# Patient Record
Sex: MALE | Race: WHITE | NOT HISPANIC OR LATINO | Employment: FULL TIME | URBAN - METROPOLITAN AREA
[De-identification: names, ages, dates, MRNs, and addresses within clinical notes are randomized per-mention and may not be internally consistent; named-entity substitution may affect disease eponyms.]

---

## 2019-05-13 ENCOUNTER — OFFICE VISIT (OUTPATIENT)
Dept: FAMILY MEDICINE | Facility: CLINIC | Age: 34
End: 2019-05-13
Payer: MEDICAID

## 2019-05-13 VITALS
WEIGHT: 270 LBS | BODY MASS INDEX: 37.8 KG/M2 | HEIGHT: 71 IN | TEMPERATURE: 98.5 F | HEART RATE: 72 BPM | SYSTOLIC BLOOD PRESSURE: 122 MMHG | DIASTOLIC BLOOD PRESSURE: 83 MMHG

## 2019-05-13 DIAGNOSIS — K21.9 GASTROESOPHAGEAL REFLUX DISEASE WITHOUT ESOPHAGITIS: ICD-10-CM

## 2019-05-13 DIAGNOSIS — Z23 NEED FOR TUBERCULOSIS VACCINATION: ICD-10-CM

## 2019-05-13 DIAGNOSIS — Z00.00 ANNUAL PHYSICAL EXAM: Primary | ICD-10-CM

## 2019-05-13 DIAGNOSIS — F32.5 MAJOR DEPRESSIVE DISORDER IN FULL REMISSION, UNSPECIFIED WHETHER RECURRENT (H): ICD-10-CM

## 2019-05-13 DIAGNOSIS — Z23 NEED FOR PROPHYLACTIC VACCINATION WITH TETANUS-DIPHTHERIA (TD): ICD-10-CM

## 2019-05-13 DIAGNOSIS — J45.20 MILD INTERMITTENT ASTHMA, UNSPECIFIED WHETHER COMPLICATED: ICD-10-CM

## 2019-05-13 PROCEDURE — 86580 TB INTRADERMAL TEST: CPT | Performed by: FAMILY MEDICINE

## 2019-05-13 PROCEDURE — 99385 PREV VISIT NEW AGE 18-39: CPT | Performed by: FAMILY MEDICINE

## 2019-05-13 RX ORDER — IPRATROPIUM BROMIDE 21 UG/1
2 SPRAY, METERED NASAL EVERY 12 HOURS
COMMUNITY
Start: 2019-05-13 | End: 2019-05-20

## 2019-05-13 RX ORDER — FLUTICASONE PROPIONATE AND SALMETEROL 232; 14 UG/1; UG/1
1 POWDER, METERED RESPIRATORY (INHALATION) 2 TIMES DAILY
Qty: 1 INHALER | Refills: 11 | Status: SHIPPED | OUTPATIENT
Start: 2019-05-13 | End: 2019-05-29 | Stop reason: ALTCHOICE

## 2019-05-13 ASSESSMENT — ENCOUNTER SYMPTOMS
ABDOMINAL PAIN: 0
SORE THROAT: 0
PARESTHESIAS: 0
WEAKNESS: 0
FREQUENCY: 0
EYE PAIN: 0
CONSTIPATION: 0
MYALGIAS: 0
ARTHRALGIAS: 0
HEARTBURN: 0
JOINT SWELLING: 0
HEMATOCHEZIA: 0
DYSURIA: 0
NAUSEA: 0
NERVOUS/ANXIOUS: 0
SHORTNESS OF BREATH: 0
DIARRHEA: 0
CHILLS: 0
COUGH: 0
HEMATURIA: 0
HEADACHES: 0
FEVER: 0
PALPITATIONS: 0
DIZZINESS: 0

## 2019-05-13 ASSESSMENT — ASTHMA QUESTIONNAIRES
ACT_TOTALSCORE: 14
QUESTION_4 LAST FOUR WEEKS HOW OFTEN HAVE YOU USED YOUR RESCUE INHALER OR NEBULIZER MEDICATION (SUCH AS ALBUTEROL): THREE OR MORE TIMES PER DAY
QUESTION_3 LAST FOUR WEEKS HOW OFTEN DID YOUR ASTHMA SYMPTOMS (WHEEZING, COUGHING, SHORTNESS OF BREATH, CHEST TIGHTNESS OR PAIN) WAKE YOU UP AT NIGHT OR EARLIER THAN USUAL IN THE MORNING: ONCE A WEEK
QUESTION_5 LAST FOUR WEEKS HOW WOULD YOU RATE YOUR ASTHMA CONTROL: WELL CONTROLLED
QUESTION_1 LAST FOUR WEEKS HOW MUCH OF THE TIME DID YOUR ASTHMA KEEP YOU FROM GETTING AS MUCH DONE AT WORK, SCHOOL OR AT HOME: NONE OF THE TIME
QUESTION_2 LAST FOUR WEEKS HOW OFTEN HAVE YOU HAD SHORTNESS OF BREATH: MORE THAN ONCE A DAY

## 2019-05-13 ASSESSMENT — MIFFLIN-ST. JEOR: SCORE: 2181.58

## 2019-05-13 ASSESSMENT — PAIN SCALES - GENERAL: PAINLEVEL: NO PAIN (0)

## 2019-05-13 NOTE — LETTER
My Asthma Action Plan  Name: Jorge Madrigal   YOB: 1985  Date: 5/13/2019   My doctor: Cary Moon MD   My clinic: [facility]        My Control Medicine: Fluticasone + salmeterol (Advair) -  /21 mcg bid  My Rescue Medicine: Albuterol (Proair/Ventolin/Proventil) inhaler qid as needed   My Asthma Severity: intermittent  Avoid your asthma triggers: pollens and mold        The medication may be given at school or day care?: NA  Child can carry and use inhaler at school with approval of school nurse?: Yes and No       GREEN ZONE   Good Control    I feel good    No cough or wheeze    Can work, sleep and play without asthma symptoms       Take your asthma control medicine every day.     1. If exercise triggers your asthma, take your rescue medication    15 minutes before exercise or sports, and    During exercise if you have asthma symptoms  2. Spacer to use with inhaler: If you have a spacer, make sure to use it with your inhaler             YELLOW ZONE Getting Worse  I have ANY of these:    I do not feel good    Cough or wheeze    Chest feels tight    Wake up at night   1. Keep taking your Green Zone medications  2. Start taking your rescue medicine:    every 20 minutes for up to 1 hour. Then every 4 hours for 24-48 hours.  3. If you stay in the Yellow Zone for more than 12-24 hours, contact your doctor.  4. If you do not return to the Green Zone in 12-24 hours or you get worse, start taking your oral steroid medicine if prescribed by your provider.           RED ZONE Medical Alert - Get Help  I have ANY of these:    I feel awful    Medicine is not helping    Breathing getting harder    Trouble walking or talking    Nose opens wide to breathe       1. Take your rescue medicine NOW  2. If your provider has prescribed an oral steroid medicine, start taking it NOW  3. Call your doctor NOW  4. If you are still in the Red Zone after 20 minutes and you have not reached  your doctor:    Take your rescue medicine again and    Call 911 or go to the emergency room right away    See your regular doctor within 2 weeks of an Emergency Room or Urgent Care visit for follow-up treatment.          Annual Reminders:  Meet with Asthma Educator,  Flu Shot in the Fall, consider Pneumonia Vaccination for patients with asthma (aged 19 and older).    Pharmacy: [facility] [id number] [facility] [address] [place] [address] NE AT [address]                      Asthma Triggers  How To Control Things That Make Your Asthma Worse    Triggers are things that make your asthma worse.  Look at the list below to help you find your triggers and what you can do about them.  You can help prevent asthma flare-ups by staying away from your triggers.      Trigger                                                          What you can do   Cigarette Smoke  Tobacco smoke can make asthma worse. Do not allow smoking in your home, car or around you.  Be sure no one smokes at a child s day care or school.  If you smoke, ask your health care provider for ways to help you quit.  Ask family members to quit too.  Ask your health care provider for a referral to Quit Plan to help you quit smoking, or call 5-494-888-PLAN.     Colds, Flu, Bronchitis  These are common triggers of asthma. Wash your hands often.  Don t touch your eyes, nose or mouth.  Get a flu shot every year.     Dust Mites  These are tiny bugs that live in cloth or carpet. They are too small to see. Wash sheets and blankets in hot water every week.   Encase pillows and mattress in dust mite proof covers.  Avoid having carpet if you can. If you have carpet, vacuum weekly.   Use a dust mask and HEPA vacuum.   Pollen and Outdoor Mold  Some people are allergic to trees, grass, or weed pollen, or molds. Try to keep your windows closed.  Limit time out doors when pollen count is high.   Ask you health care provider about taking medicine during allergy  season.     Animal Dander  Some people are allergic to skin flakes, urine or saliva from pets with fur or feathers. Keep pets with fur or feathers out of your home.    If you can t keep the pet outdoors, then keep the pet out of your bedroom.  Keep the bedroom door closed.  Keep pets off cloth furniture and away from stuffed toys.     Mice, Rats, and Cockroaches  Some people are allergic to the waste from these pests.   Cover food and garbage.  Clean up spills and food crumbs.  Store grease in the refrigerator.   Keep food out of the bedroom.   Indoor Mold  This can be a trigger if your home has high moisture. Fix leaking faucets, pipes, or other sources of water.   Clean moldy surfaces.  Dehumidify basement if it is damp and smelly.   Smoke, Strong Odors, and Sprays  These can reduce air quality. Stay away from strong odors and sprays, such as perfume, powder, hair spray, paints, smoke incense, paint, cleaning products, candles and new carpet.   Exercise or Sports  Some people with asthma have this trigger. Be active!  Ask your doctor about taking medicine before sports or exercise to prevent symptoms.    Warm up for 5-10 minutes before and after sports or exercise.     Other Triggers of Asthma  Cold air:  Cover your nose and mouth with a scarf.  Sometimes laughing or crying can be a trigger.  Some medicines and food can trigger asthma.

## 2019-05-13 NOTE — PROGRESS NOTES
SUBJECTIVE:   CC: Jorge Madrigal is an 34 year old male who presents for preventative health visit.   Patient comes for physical exam.  He reports he is need to have a skin, TB test for starting a new work.  He does have history of asthma which been controlled.  History of allergy.  Does not smoke.    He reports his mother passed away from colon cancer late 60s.  He reports he had a colonoscopy about 3 to 4 years ago which was normal.  Denies change in his bowel habit.    Healthy Habits:     Getting at least 3 servings of Calcium per day:  Yes    Bi-annual eye exam:  NO    Dental care twice a year:  Yes    Sleep apnea or symptoms of sleep apnea:  None    Diet:  Regular (no restrictions)    Frequency of exercise:  2-3 days/week    Duration of exercise:  45-60 minutes    Taking medications regularly:  Yes    Barriers to taking medications:  None    Medication side effects:  None    PHQ-2 Total Score: 0    Additional concerns today:  No      Today's PHQ-2 Score:   PHQ-2 ( 1999 Pfizer) 5/13/2019   Q1: Little interest or pleasure in doing things 0   Q2: Feeling down, depressed or hopeless 0   PHQ-2 Score 0   Q1: Little interest or pleasure in doing things Not at all   Q2: Feeling down, depressed or hopeless Not at all   PHQ-2 Score 0       Abuse: Current or Past(Physical, Sexual or Emotional)- No  Do you feel safe in your environment? Yes    Social History     Tobacco Use     Smoking status: Never Smoker     Smokeless tobacco: Never Used     Tobacco comment: Occassionally   Substance Use Topics     Alcohol use: Yes     If you drink alcohol do you typically have >3 drinks per day or >7 drinks per week? No    Alcohol Use 5/13/2019   Prescreen: >3 drinks/day or >7 drinks/week? Yes   AUDIT SCORE  5     AUDIT - Alcohol Use Disorders Identification Test - Reproduced from the World Health Organization Audit 2001 (Second Edition) 5/13/2019   1.  How often do you have a drink containing alcohol? 4 or more times a week   2.   How many drinks containing alcohol do you have on a typical day when you are drinking? 1 or 2   3.  How often do you have five or more drinks on one occasion? Less than monthly   4.  How often during the last year have you found that you were not able to stop drinking once you had started? Never   5.  How often during the last year have you failed to do what was normally expected of you because of drinking? Never   6.  How often during the last year have you needed a first drink in the morning to get yourself going after a heavy drinking session? Never   7.  How often during the last year have you had a feeling of guilt or remorse after drinking? Never   8.  How often during the last year have you been unable to remember what happened the night before because of your drinking? Never   9.  Have you or someone else been injured because of your drinking? No   10. Has a relative, friend, doctor or other health care worker been concerned about your drinking or suggested you cut down? No   TOTAL SCORE 5       Last PSA: No results found for: PSA    Reviewed orders with patient. Reviewed health maintenance and updated orders accordingly - Yes  Lab work is in process  Labs reviewed in EPIC  BP Readings from Last 3 Encounters:   05/13/19 122/83    Wt Readings from Last 3 Encounters:   05/13/19 122.5 kg (270 lb)                  Patient Active Problem List   Diagnosis     Asthma     GERD (gastroesophageal reflux disease)     Depression     Past Surgical History:   Procedure Laterality Date     COLONOSCOPY  2015    Normal       Social History     Tobacco Use     Smoking status: Never Smoker     Smokeless tobacco: Never Used     Tobacco comment: Occassionally   Substance Use Topics     Alcohol use: Yes     Family History   Problem Relation Age of Onset     Colon Cancer Mother 60         Current Outpatient Medications   Medication Sig Dispense Refill     Albuterol Sulfate (VENTOLIN HFA IN)        fluticasone-salmeterol (AIRDUO  RESPICLICK) 232-14 MCG/ACT inhaler Inhale 1 puff into the lungs 2 times daily 1 Inhaler 11     ipratropium (ATROVENT) 0.03 % nasal spray Spray 2 sprays into both nostrils every 12 hours       OMEPRAZOLE PO Take 20 mg by mouth       SERTRALINE HCL PO Take 50 mg by mouth daily       No Known Allergies  No lab results found.     Reviewed and updated as needed this visit by clinical staff  Tobacco  Allergies  Med Hx  Surg Hx  Fam Hx  Soc Hx        Reviewed and updated as needed this visit by Provider        Past Medical History:   Diagnosis Date     Asthma      Depression      GERD (gastroesophageal reflux disease)       Past Surgical History:   Procedure Laterality Date     COLONOSCOPY  2015    Normal     OB History   No data available       Review of Systems   Constitutional: Negative for chills and fever.   HENT: Negative for congestion, ear pain, hearing loss and sore throat.    Eyes: Negative for pain and visual disturbance.   Respiratory: Negative for cough and shortness of breath.    Cardiovascular: Negative for chest pain, palpitations and peripheral edema.   Gastrointestinal: Negative for abdominal pain, constipation, diarrhea, heartburn, hematochezia and nausea.   Genitourinary: Negative for discharge, dysuria, frequency, genital sores, hematuria, impotence and urgency.   Musculoskeletal: Negative for arthralgias, joint swelling and myalgias.   Skin: Negative for rash.   Neurological: Negative for dizziness, weakness, headaches and paresthesias.   Psychiatric/Behavioral: Negative for mood changes. The patient is not nervous/anxious.      CONSTITUTIONAL: NEGATIVE for fever, chills, change in weight  INTEGUMENTARY/SKIN: NEGATIVE for worrisome rashes, moles or lesions  EYES: NEGATIVE for vision changes or irritation  ENT: NEGATIVE for ear, mouth and throat problems  RESP: NEGATIVE for significant cough or SOB  CV: NEGATIVE for chest pain, palpitations or peripheral edema  GI: NEGATIVE for nausea, abdominal  pain, heartburn, or change in bowel habits   male: negative for dysuria, hematuria, decreased urinary stream, erectile dysfunction, urethral discharge  MUSCULOSKELETAL: NEGATIVE for significant arthralgias or myalgia  NEURO: NEGATIVE for weakness, dizziness or paresthesias  PSYCHIATRIC: NEGATIVE for changes in mood or affect    OBJECTIVE:   There were no vitals taken for this visit.    Physical Exam  GENERAL: healthy, alert and no distress  EYES: Eyes grossly normal to inspection, PERRL and conjunctivae and sclerae normal  HENT: ear canals and TM's normal, nose and mouth without ulcers or lesions  NECK: no adenopathy, no asymmetry, masses, or scars and thyroid normal to palpation  RESP: lungs clear to auscultation - no rales, rhonchi or wheezes  CV: regular rate and rhythm, normal S1 S2, no S3 or S4, no murmur, click or rub, no peripheral edema and peripheral pulses strong  ABDOMEN: soft, nontender, no hepatosplenomegaly, no masses and bowel sounds normal  MS: no gross musculoskeletal defects noted, no edema  SKIN: no suspicious lesions or rashes  NEURO: Normal strength and tone, mentation intact and speech normal  PSYCH: mentation appears normal, affect normal/bright    Diagnostic Test Results:  Declined blood work    ASSESSMENT/PLAN:   1. Annual physical exam  Declined lab  He is up to date with all immunizations.    3. Mild intermittent asthma, unspecified whether complicated    - fluticasone-salmeterol (AIRDUO RESPICLICK) 232-14 MCG/ACT inhaler; Inhale 1 puff into the lungs 2 times daily  Dispense: 1 Inhaler; Refill: 11    4. Need for tuberculosis vaccination    - TB INTRADERMAL TEST  - VACCINE ADMINISTRATION, INITIAL    5. Gastroesophageal reflux disease without esophagitis      6. Major depressive disorder in full remission, unspecified whether recurrent (H)    COUNSELING:   Reviewed preventive health counseling, as reflected in patient instructions       Regular exercise       Healthy diet/nutrition        Vision screening       Hearing screening       Safe sex practices/STD prevention    There is no height or weight on file to calculate BMI.      reports that he has never smoked. He has never used smokeless tobacco.      Counseling Resources:  ATP IV Guidelines  Pooled Cohorts Equation Calculator  FRAX Risk Assessment  ICSI Preventive Guidelines  Dietary Guidelines for Americans, 2010  USDA's MyPlate  ASA Prophylaxis  Lung CA Screening    Cary Moon MD  [facility]

## 2019-05-14 ASSESSMENT — ASTHMA QUESTIONNAIRES: ACT_TOTALSCORE: 14

## 2019-05-16 ENCOUNTER — ALLIED HEALTH/NURSE VISIT (OUTPATIENT)
Dept: NURSING | Facility: CLINIC | Age: 34
End: 2019-05-16
Payer: MEDICAID

## 2019-05-16 DIAGNOSIS — Z11.1 SCREENING FOR TUBERCULOSIS: Primary | ICD-10-CM

## 2019-05-16 LAB
PPDINDURATION: 0 MM (ref 0–5)
PPDREDNESS: 0 MM

## 2019-05-16 PROCEDURE — 99207 ZZC NO CHARGE NURSE ONLY: CPT

## 2019-05-16 NOTE — NURSING NOTE
Mantoux result:  Lab Results   Component Value Date    PPDREDNESS 0 05/16/2019    PPDINDURATIO 0 05/16/2019     Is induration greater than 5mm?  No     Mantoux results: negative  No induration.  No swelling.  No redness.    He needs 2 step mantoux, scheduled for about 2 weeks from now.    Hilaria Avila RN  [facility]

## 2019-05-19 ENCOUNTER — MYC MEDICAL ADVICE (OUTPATIENT)
Dept: FAMILY MEDICINE | Facility: CLINIC | Age: 34
End: 2019-05-19

## 2019-05-19 DIAGNOSIS — J45.20 MILD INTERMITTENT ASTHMA, UNSPECIFIED WHETHER COMPLICATED: Primary | ICD-10-CM

## 2019-05-20 NOTE — TELEPHONE ENCOUNTER
Requested Prescriptions   Pending Prescriptions Disp Refills     ipratropium (ATROVENT) 0.03 % nasal spray 1 Box 1     Sig: Spray 2 sprays into both nostrils every 12 hours       There is no refill protocol information for this order        montelukast (SINGULAIR) 10 MG tablet       Sig: Take 1 tablet (10 mg) by mouth At Bedtime       There is no refill protocol information for this order        sertraline (ZOLOFT) 100 MG tablet       Sig: Take 0.5 tablets (50 mg) by mouth daily       There is no refill protocol information for this order        albuterol (VENTOLIN HFA) 108 (90 Base) MCG/ACT inhaler 8.5 g        There is no refill protocol information for this order        Last Written Prescription Date:    Historical medications  Last office visit: 5/13/2019 with prescribing provider:  Dr. Moon - annual physical   Future Office Visit:   Next 5 appointments (look out 90 days)    Jun 05, 2019  8:30 AM CDT  Nurse Only with CP ANCILLARY  [facility] [address] [facility] [address]  [phone]   Jun 07, 2019  9:00 AM CDT  Nurse Only with CP ANCILLARY  [facility] ([facility]) [address] [facility] [address]  [phone]         Routing refill request to provider for review/approval because:  Medication is reported/historical      Mirtha Duncan RN  [facility]

## 2019-05-21 RX ORDER — SERTRALINE HYDROCHLORIDE 100 MG/1
50 TABLET, FILM COATED ORAL DAILY
Qty: 45 TABLET | Refills: 1 | Status: SHIPPED | OUTPATIENT
Start: 2019-05-21 | End: 2019-08-07

## 2019-05-21 RX ORDER — IPRATROPIUM BROMIDE 21 UG/1
2 SPRAY, METERED NASAL 2 TIMES DAILY
Qty: 30 ML | Refills: 0 | Status: SHIPPED | OUTPATIENT
Start: 2019-05-21 | End: 2019-06-08

## 2019-05-21 RX ORDER — ALBUTEROL SULFATE 90 UG/1
2 AEROSOL, METERED RESPIRATORY (INHALATION) EVERY 4 HOURS PRN
Qty: 8.5 G | Refills: 0 | Status: SHIPPED | OUTPATIENT
Start: 2019-05-21 | End: 2019-06-08

## 2019-05-21 RX ORDER — MONTELUKAST SODIUM 10 MG/1
10 TABLET ORAL AT BEDTIME
Qty: 90 TABLET | Refills: 0 | Status: SHIPPED | OUTPATIENT
Start: 2019-05-21 | End: 2019-08-07

## 2019-05-21 NOTE — TELEPHONE ENCOUNTER
"Does not appear provider has addressed refill requests yet and encounter may have been \"doned\" by provider.  Re-routed to Dr. Moon to address.   Patient had recent physical exam.    Hilaria Avila RN  [facility]      "

## 2019-05-22 ENCOUNTER — TELEPHONE (OUTPATIENT)
Dept: FAMILY MEDICINE | Facility: CLINIC | Age: 34
End: 2019-05-22

## 2019-05-24 NOTE — TELEPHONE ENCOUNTER
PA Initiation    Medication: Fluticasone-Salmeterol 232-14 MCG/ACT  Insurance Company: [place] Medicaid ([facility]) - Phone [phone] Fax [phone]  Pharmacy Filling the Rx: [facility] [id number] - [place], [place] - [facility] [address] AT [facility] [address] [facility]  Filling Pharmacy Phone: [phone]  Filling Pharmacy Fax:    Start Date: 5/24/2019    Central Prior Authorization Team   Phone: [phone]

## 2019-05-24 NOTE — TELEPHONE ENCOUNTER
PRIOR AUTHORIZATION DENIED    Medication: Fluticasone-Salmeterol 232-14 MCG/ACT    Denial Date: 5/24/2019    Denial Rational: Patient must have a history of trial & failure to the formulary alternative(s) or have a contraindication or intolerance to the formulary alternatives: advair, dulera and symbicort        Appeal Information:

## 2019-05-29 ENCOUNTER — MYC MEDICAL ADVICE (OUTPATIENT)
Dept: FAMILY MEDICINE | Facility: CLINIC | Age: 34
End: 2019-05-29

## 2019-05-29 DIAGNOSIS — J45.20 MILD INTERMITTENT ASTHMA, UNSPECIFIED WHETHER COMPLICATED: Primary | ICD-10-CM

## 2019-05-29 RX ORDER — FLUTICASONE PROPIONATE 220 UG/1
1 AEROSOL, METERED RESPIRATORY (INHALATION) 2 TIMES DAILY
Qty: 1 INHALER | Refills: 3 | Status: SHIPPED | OUTPATIENT
Start: 2019-05-29 | End: 2019-07-01 | Stop reason: ALTCHOICE

## 2019-06-05 ENCOUNTER — ALLIED HEALTH/NURSE VISIT (OUTPATIENT)
Dept: NURSING | Facility: CLINIC | Age: 34
End: 2019-06-05
Payer: COMMERCIAL

## 2019-06-05 DIAGNOSIS — Z23 NEED FOR TUBERCULOSIS VACCINATION: Primary | ICD-10-CM

## 2019-06-05 PROCEDURE — 99207 ZZC NO CHARGE NURSE ONLY: CPT

## 2019-06-05 PROCEDURE — 86580 TB INTRADERMAL TEST: CPT

## 2019-06-05 NOTE — NURSING NOTE
The patient is asked the following questions today and these are his answers:    -Have you had a mantoux administered in the past 30 days?    Yes  -Have you had a previous positive Mantoux.  No  -Have you received BCG in the past.  No  -Have you had a live vaccine  (MMR, Varicella, OPV, Yellow Fever) in the last 6 weeks.  No  -Have you had and active  viral or bacterial infection in the past 6 weeks.  No  -Have you received corticosteroids or immunosuppressive agents in the past 6 weeks.  No  -Have you been diagnosed with HIV?  No  -Do you have a maglinancy?  No  Mantoux given to patient today    Time of administration  8:47AM     Date of administration 06/05/2021     Given in Left forearm.     Patient advised to return 48- 72 hours for reading.   Patient return in 23 days from the first TB test and verified with  and antoinette.    Amanda Sommers MA on 6/5/2019 at 8:53 AM

## 2019-06-05 NOTE — NURSING NOTE
Prior to injection, verified patient identity using patient's name and date of birth.  Due to injection administration, patient instructed to remain in clinic for 15 minutes  afterwards, and to report any adverse reaction to me immediately.    TB placement     Drug Amount Wasted:  None.  Vial/Syringe: Single dose vial  Expiration Date:  4/8/2021  Amanda Sommers MA on 6/5/2019 at 9:02 AM

## 2019-06-07 ENCOUNTER — ALLIED HEALTH/NURSE VISIT (OUTPATIENT)
Dept: NURSING | Facility: CLINIC | Age: 34
End: 2019-06-07
Payer: COMMERCIAL

## 2019-06-07 DIAGNOSIS — J45.20 MILD INTERMITTENT ASTHMA, UNSPECIFIED WHETHER COMPLICATED: ICD-10-CM

## 2019-06-07 DIAGNOSIS — Z11.1 SCREENING EXAMINATION FOR PULMONARY TUBERCULOSIS: ICD-10-CM

## 2019-06-07 LAB
PPDINDURATION: 0 MM (ref 0–5)
PPDREDNESS: 0 MM

## 2019-06-07 PROCEDURE — 99207 ZZC NO CHARGE NURSE ONLY: CPT

## 2019-06-07 NOTE — TELEPHONE ENCOUNTER
Requested Prescriptions   Pending Prescriptions Disp Refills     ipratropium (ATROVENT) 0.03 % nasal spray 30 mL 0     Sig: Spray 2 sprays into both nostrils 2 times daily       There is no refill protocol information for this order   Last Written Prescription Date:  5-21-19  Last Fill Quantity: 30ml,  # refills: 0   Last office visit: 5/13/2019 with prescribing provider:  5-13-19   Future Office Visit:   Next 5 appointments (look out 90 days)    Jun 07, 2019  9:00 AM CDT  Nurse Only with CP ANCILLARY  [facility] [address] [facility] [id number] [phone]

## 2019-06-07 NOTE — TELEPHONE ENCOUNTER
Requested Prescriptions   Pending Prescriptions Disp Refills     albuterol (VENTOLIN HFA) 108 (90 Base) MCG/ACT inhaler 8.5 g 0     Sig: Inhale 2 puffs into the lungs every 4 hours as needed for shortness of breath / dyspnea or wheezing       There is no refill protocol information for this order   Last Written Prescription Date:  5-21-19  Last Fill Quantity: 8.5g,  # refills: 0  Last office visit: 5/13/2019 with prescribing provider:     Future Office Visit:   Next 5 appointments (look out 90 days)    Jun 07, 2019  9:00 AM CDT  Nurse Only with CP ANCILLARY  [facility] [address] [facility] [address] [phone]

## 2019-06-07 NOTE — PATIENT INSTRUCTIONS
Mantoux result:  Lab Results   Component Value Date    PPDREDNESS 0 06/07/2019    PPDINDURATIO 0 06/07/2019     Is induration greater than 5mm?  Justyna Hernandez, RN

## 2019-06-07 NOTE — TELEPHONE ENCOUNTER
ACT Total Scores 5/13/2019   ACT TOTAL SCORE (Goal Greater than or Equal to 20) 14   In the past 12 months, how many times did you visit the emergency room for your asthma without being admitted to the hospital? 0   In the past 12 months, how many times were you hospitalized overnight because of your asthma? 0     Routing refill request to provider for review/approval because:  ACT < 20.  Delphine Black RN [facility] Triage.

## 2019-06-07 NOTE — TELEPHONE ENCOUNTER
Routing refill request to provider for review/approval because:  Drug not on the [facility] refill protocol   Delphine Black RN [facility] Triage.

## 2019-06-08 RX ORDER — ALBUTEROL SULFATE 90 UG/1
2 AEROSOL, METERED RESPIRATORY (INHALATION) EVERY 4 HOURS PRN
Qty: 8.5 G | Refills: 0 | Status: SHIPPED | OUTPATIENT
Start: 2019-06-08 | End: 2019-07-10

## 2019-06-08 RX ORDER — IPRATROPIUM BROMIDE 21 UG/1
2 SPRAY, METERED NASAL 2 TIMES DAILY
Qty: 30 ML | Refills: 0 | Status: SHIPPED | OUTPATIENT
Start: 2019-06-08 | End: 2019-07-11

## 2019-06-13 ENCOUNTER — TELEPHONE (OUTPATIENT)
Dept: FAMILY MEDICINE | Facility: CLINIC | Age: 34
End: 2019-06-13

## 2019-06-13 NOTE — TELEPHONE ENCOUNTER
Prior Authorization Retail Medication Request    Medication/Dose: Flovent  MCG Oral  INH  ICD code (if different than what is on RX):    Previously Tried and Failed:    Rationale:      Insurance Name:  [employer]  Insurance ID:  [id number]      Pharmacy Information (if different than what is on RX)  Name:  [facility]  Phone:  [phone]

## 2019-06-17 NOTE — TELEPHONE ENCOUNTER
PRIOR AUTHORIZATION DENIED    Medication: Flovent  MCG Oral  INH    Denial Date: 6/17/2019    Denial Rational:  Patient must have a history of trial & failure to the formulary alternative(s) or have a contraindication or intolerance to the formulary alternatives:      Appeal Information:    If you would like to appeal, please supply P/A team with a letter of medical necessity with clinical reason.

## 2019-06-17 NOTE — TELEPHONE ENCOUNTER
PA was DENIED.    Do you want to Appeal? If so please write a letter explaining why the patient needs to be on this medication. Then route this encounter with the letter to MARTHA WREN MEDWili Rodríguez MA

## 2019-06-17 NOTE — TELEPHONE ENCOUNTER
Central Prior Authorization Team   Phone: [phone]      PA Initiation    Medication: Flovent  MCG Oral  INH  Insurance Company: [facility] - Phone [phone] Fax [phone]  Pharmacy Filling the Rx: [id number] - [place], [place] - [address] AT [facility] OF [address]  Filling Pharmacy Phone: [phone]  Filling Pharmacy Fax:    Start Date: 6/17/2019

## 2019-06-29 ENCOUNTER — MYC MEDICAL ADVICE (OUTPATIENT)
Dept: FAMILY MEDICINE | Facility: CLINIC | Age: 34
End: 2019-06-29

## 2019-06-29 DIAGNOSIS — J45.20 MILD INTERMITTENT ASTHMA, UNSPECIFIED WHETHER COMPLICATED: Primary | ICD-10-CM

## 2019-07-01 ENCOUNTER — TELEPHONE (OUTPATIENT)
Dept: FAMILY MEDICINE | Facility: CLINIC | Age: 34
End: 2019-07-01

## 2019-07-01 DIAGNOSIS — J45.20 MILD INTERMITTENT ASTHMA, UNSPECIFIED WHETHER COMPLICATED: Primary | ICD-10-CM

## 2019-07-01 NOTE — TELEPHONE ENCOUNTER
Routing to PCP to review and advise.    Please see My Chart message.    Pharmacy cued.      Mirtha Duncan RN  [facility]

## 2019-07-01 NOTE — TELEPHONE ENCOUNTER
Prior Authorization Retail Medication Request    Medication/Dose: Arnuity Ellipta 200 MCG Oral INH 30  ICD code (if different than what is on RX):    Previously Tried and Failed:    Rationale:      Insurance Name:  [employer]  Insurance ID:  [id number]       Pharmacy Information (if different than what is on RX)  Name:  [facility]  Phone:  [phone]

## 2019-07-05 NOTE — TELEPHONE ENCOUNTER
I spoke with Adelaida from the PA pool. She said that they will get to it today.  Katlin Alexis, CMA on 7/5/2019 at 10:44 AM

## 2019-07-05 NOTE — TELEPHONE ENCOUNTER
Central Prior Authorization Team   Phone: [phone]    PRIOR AUTHORIZATION DENIED    Medication: Arnuity Ellipta 200 MCG Oral INH 30- DENIED    Denial Date: 7/5/2019    Denial Rational: Patient has to try/fail 2 preferred drugs:          Appeal Information: If provider would like to appeal we will need a detailed letter of medical necessity to start the process. Then re-route this request back to the PA pool.

## 2019-07-05 NOTE — TELEPHONE ENCOUNTER
Central Prior Authorization Team   Phone: [phone]    PA Initiation    Medication: Arnuity Ellipta 200 MCG Oral INH 30- INITIATED  Insurance Company: [employer] [place] - Phone [phone] Fax [phone]  Pharmacy Filling the Rx: [id number] - [place], [place] - [address] AT [facility] OF [address]  Filling Pharmacy Phone: [phone]  Filling Pharmacy Fax:    Start Date: 7/5/2019

## 2019-07-08 DIAGNOSIS — J45.20 MILD INTERMITTENT ASTHMA, UNSPECIFIED WHETHER COMPLICATED: Primary | ICD-10-CM

## 2019-07-08 DIAGNOSIS — J45.20 MILD INTERMITTENT ASTHMA, UNSPECIFIED WHETHER COMPLICATED: ICD-10-CM

## 2019-07-08 NOTE — TELEPHONE ENCOUNTER
Requested Prescriptions   Pending Prescriptions Disp Refills     ipratropium (ATROVENT) 0.03 % nasal spray 30 mL 0     Sig: Spray 2 sprays into both nostrils 2 times daily       There is no refill protocol information for this order   Last Written Prescription Date:  6-8-19  Last Fill Quantity: 30ml,  # refills: 0   Last office visit: 5/13/2019 with prescribing provider:  5-13-19   Future Office Visit:

## 2019-07-08 NOTE — TELEPHONE ENCOUNTER
"Requested Prescriptions   Pending Prescriptions Disp Refills     albuterol (VENTOLIN HFA) 108 (90 Base) MCG/ACT inhaler 8.5 g 0     Sig: Inhale 2 puffs into the lungs every 4 hours as needed for shortness of breath / dyspnea or wheezing   Last Written Prescription Date:  6-8-19  Last Fill Quantity: 8.5g,  # refills: 0   Last office visit: 5/13/2019 with prescribing provider:  5-13-19   Future Office Visit:        Asthma Maintenance Inhalers - Anticholinergics Failed - 7/8/2019  3:01 PM        Failed - Asthma control assessment score within normal limits in last 6 months     Please review ACT score.           Passed - Patient is age 12 years or older        Passed - Medication is active on med list        Passed - Recent (6 mo) or future (30 days) visit within the authorizing provider's specialty     Patient had office visit in the last 6 months or has a visit in the next 30 days with authorizing provider or within the authorizing provider's specialty.  See \"Patient Info\" tab in inbasket, or \"Choose Columns\" in Meds & Orders section of the refill encounter.              "

## 2019-07-10 RX ORDER — ALBUTEROL SULFATE 90 UG/1
2 AEROSOL, METERED RESPIRATORY (INHALATION) EVERY 4 HOURS PRN
Qty: 8.5 G | Refills: 0 | Status: SHIPPED | OUTPATIENT
Start: 2019-07-10 | End: 2019-08-07

## 2019-07-11 RX ORDER — IPRATROPIUM BROMIDE 21 UG/1
2 SPRAY, METERED NASAL 2 TIMES DAILY
Qty: 30 ML | Refills: 0 | Status: SHIPPED | OUTPATIENT
Start: 2019-07-11 | End: 2019-08-15

## 2019-08-01 ENCOUNTER — MYC MEDICAL ADVICE (OUTPATIENT)
Dept: FAMILY MEDICINE | Facility: CLINIC | Age: 34
End: 2019-08-01

## 2019-08-01 NOTE — TELEPHONE ENCOUNTER
"Patient was seen and established care with Dr. Moon on 5/13/19.  1 year follow up advised.    PHQ-9 score:  No flowsheet data found.    The sertraline on Epic list is currently associated with \"asthma\".    Routed MyChart response to patient advising he schedule a visit with another provider to address this.    Hilaria Avila RN  [facility]              "

## 2019-08-05 DIAGNOSIS — J45.20 MILD INTERMITTENT ASTHMA, UNSPECIFIED WHETHER COMPLICATED: ICD-10-CM

## 2019-08-05 NOTE — TELEPHONE ENCOUNTER
"Requested Prescriptions   Pending Prescriptions Disp Refills     montelukast (SINGULAIR) 10 MG tablet 90 tablet 0     Sig: Take 1 tablet (10 mg) by mouth At Bedtime   Last Written Prescription Date:  5-21-19  Last Fill Quantity: 90,  # refills: 0   Last office visit: 5/13/2019 with prescribing provider:  5-13-19   Future Office Visit:   Next 5 appointments (look out 90 days)    Aug 07, 2019  8:40 AM CDT  Antonella Garnett with Antonieta Gagnon PA-C  [facility] ([facility]) [address] [facility] [address]  [phone]   Oct 09, 2019  9:00 AM CDT  Return Visit with Otto Calzada [facility] ([facility]) [address] [place] [address]  [phone]   Oct 16, 2019  8:00 AM CDT  Return Visit with Otto Calzada [facility] ([facility]) [address] [place] [address]  [phone]             Leukotriene Inhibitors Protocol Failed - 8/5/2019  5:08 PM        Failed - Asthma control assessment score within normal limits in last 6 months     Please review ACT score.           Passed - Patient is age 12 or older     If patient is under 16, ok to refill using age based dosing.           Passed - Medication is active on med list        Passed - Recent (6 mo) or future (30 days) visit within the authorizing provider's specialty     Patient had office visit in the last 6 months or has a visit in the next 30 days with authorizing provider or within the authorizing provider's specialty.  See \"Patient Info\" tab in inbasket, or \"Choose Columns\" in Meds & Orders section of the refill encounter.              "

## 2019-08-06 DIAGNOSIS — J45.20 MILD INTERMITTENT ASTHMA, UNSPECIFIED WHETHER COMPLICATED: ICD-10-CM

## 2019-08-06 NOTE — TELEPHONE ENCOUNTER
"Requested Prescriptions   Pending Prescriptions Disp Refills     albuterol (VENTOLIN HFA) 108 (90 Base) MCG/ACT inhaler  Last Written Prescription Date:  7/10/19  Last Fill Quantity: 8.5g,  # refills: 0   Last office visit: 5/13/2019 with prescribing provider:  Sarai   Future Office Visit:   Next 5 appointments (look out 90 days)    Aug 07, 2019  8:40 AM CDT  MyChart Short with Antonieta Gagnon PA-C  [facility] [address] [facility] [address]  [phone]   Oct 09, 2019  9:00 AM CDT  [facility] with [facility] [address] [place] [address]  [phone]   Oct 16, 2019  8:00 AM CDT  Return Visit with Otto Calzada [facility] [address] [place] [address]  [phone]          8.5 g 0     Sig: Inhale 2 puffs into the lungs every 4 hours as needed for shortness of breath / dyspnea or wheezing       Asthma Maintenance Inhalers - Anticholinergics Failed - 8/6/2019  6:57 PM        Failed - Asthma control assessment score within normal limits in last 6 months     Please review ACT score.           Passed - Patient is age 12 years or older        Passed - Medication is active on med list        Passed - Recent (6 mo) or future (30 days) visit within the authorizing provider's specialty     Patient had office visit in the last 6 months or has a visit in the next 30 days with authorizing provider or within the authorizing provider's specialty.  See \"Patient Info\" tab in inbasket, or \"Choose Columns\" in Meds & Orders section of the refill encounter.              "

## 2019-08-07 ENCOUNTER — OFFICE VISIT (OUTPATIENT)
Dept: FAMILY MEDICINE | Facility: CLINIC | Age: 34
End: 2019-08-07
Payer: COMMERCIAL

## 2019-08-07 VITALS
TEMPERATURE: 98.2 F | BODY MASS INDEX: 38.86 KG/M2 | WEIGHT: 276 LBS | DIASTOLIC BLOOD PRESSURE: 76 MMHG | OXYGEN SATURATION: 97 % | SYSTOLIC BLOOD PRESSURE: 131 MMHG | HEART RATE: 77 BPM

## 2019-08-07 DIAGNOSIS — J45.20 MILD INTERMITTENT ASTHMA, UNSPECIFIED WHETHER COMPLICATED: ICD-10-CM

## 2019-08-07 DIAGNOSIS — F33.1 MODERATE EPISODE OF RECURRENT MAJOR DEPRESSIVE DISORDER (H): Primary | ICD-10-CM

## 2019-08-07 PROCEDURE — 99214 OFFICE O/P EST MOD 30 MIN: CPT | Performed by: PHYSICIAN ASSISTANT

## 2019-08-07 RX ORDER — ALBUTEROL SULFATE 90 UG/1
2 AEROSOL, METERED RESPIRATORY (INHALATION) EVERY 4 HOURS PRN
Qty: 8.5 G | Refills: 0 | Status: SHIPPED | OUTPATIENT
Start: 2019-08-07 | End: 2019-10-08

## 2019-08-07 RX ORDER — SERTRALINE HYDROCHLORIDE 100 MG/1
100 TABLET, FILM COATED ORAL DAILY
Qty: 30 TABLET | Refills: 1 | Status: SHIPPED | OUTPATIENT
Start: 2019-08-07 | End: 2019-11-03

## 2019-08-07 RX ORDER — CETIRIZINE HYDROCHLORIDE 10 MG/1
10 TABLET ORAL DAILY
COMMUNITY

## 2019-08-07 RX ORDER — MONTELUKAST SODIUM 10 MG/1
10 TABLET ORAL AT BEDTIME
Qty: 90 TABLET | Refills: 0 | Status: SHIPPED | OUTPATIENT
Start: 2019-08-07 | End: 2020-01-04

## 2019-08-07 RX ORDER — FLUTICASONE PROPIONATE 50 MCG
1 SPRAY, SUSPENSION (ML) NASAL DAILY
COMMUNITY
End: 2021-01-22

## 2019-08-07 ASSESSMENT — PATIENT HEALTH QUESTIONNAIRE - PHQ9: SUM OF ALL RESPONSES TO PHQ QUESTIONS 1-9: 9

## 2019-08-07 NOTE — PROGRESS NOTES
"Subjective     Jorge Madrigal is a 34 year old male who presents to clinic today for the following health issues:    HPI   Depression Followup    How are you doing with your depression since your last visit? Worsened     Are you having other symptoms that might be associated with depression? No    Have you had a significant life event?  No     Are you feeling anxious or having panic attacks?   No    Do you have any concerns with your use of alcohol or other drugs? Maybe alcohol-daily, over 5 drinks.  Hasn't had any drinks in 2 days and feels ok.       Has been on Zoloft for over a year.  Has been helping.      Not sure why worsening.  Life is going good but lots of changes.      Parents both had depression and used chemicals.      Tolerates zoloft.  Starting therapy next month    Not sure why asthma is not well controlled.  Uses albuterol daily.  Has improved but he does think maybe he is \"addicted\" to the albuterol.      Social History     Tobacco Use     Smoking status: Never Smoker     Smokeless tobacco: Never Used     Tobacco comment: Occassionally   Substance Use Topics     Alcohol use: Yes     Drug use: Never     PHQ 8/7/2019   PHQ-9 Total Score 9   Q9: Thoughts of better off dead/self-harm past 2 weeks Not at all     No flowsheet data found.  No flowsheet data found.      Suicide Assessment Five-step Evaluation and Treatment (SAFE-T)    Amount of exercise or physical activity: 2-3 days/week for an average of 15-30 minutes    Problems taking medications regularly: No    Medication side effects: none    Diet: regular (no restrictions)      Albuterol inh and Singulair refill         Patient Active Problem List   Diagnosis     Asthma     GERD (gastroesophageal reflux disease)     Depression     Screening examination for pulmonary tuberculosis     Past Surgical History:   Procedure Laterality Date     COLONOSCOPY  2015    Normal       Social History     Tobacco Use     Smoking status: Never Smoker     " "Smokeless tobacco: Never Used     Tobacco comment: Occassionally   Substance Use Topics     Alcohol use: Yes     Family History   Problem Relation Age of Onset     Colon Cancer Mother 60             Reviewed and updated as needed this visit by Provider  Meds         Review of Systems   As above      Objective    /76   Pulse 77   Temp 98.2  F (36.8  C) (Oral)   Wt 125.2 kg (276 lb)   SpO2 97%   BMI 38.86 kg/m    Body mass index is 38.86 kg/m .  Physical Exam   GENERAL: alert, no distress and obese  RESP: lungs clear to auscultation - no rales, rhonchi or wheezes  CV: regular rates and rhythm, normal S1 S2, no S3 or S4 and no murmur, click or rub  PSYCH: mentation appears normal, affect normal/bright            Assessment & Plan     1. Moderate episode of recurrent major depressive disorder (H)  Not well controlled.  Increase zoloft.      2. Mild intermittent asthma, unspecified whether complicated  Stable.  Not perfect but pt has seen asthma specialist and has tried multiple medications.  For now no changes  - albuterol (VENTOLIN HFA) 108 (90 Base) MCG/ACT inhaler; Inhale 2 puffs into the lungs every 4 hours as needed for shortness of breath / dyspnea or wheezing  Dispense: 8.5 g; Refill: 0  - montelukast (SINGULAIR) 10 MG tablet; Take 1 tablet (10 mg) by mouth At Bedtime  Dispense: 90 tablet; Refill: 0  - sertraline (ZOLOFT) 100 MG tablet; Take 1 tablet (100 mg) by mouth daily  Dispense: 30 tablet; Refill: 1     BMI:   Estimated body mass index is 38.86 kg/m  as calculated from the following:    Height as of 5/13/19: 1.795 m (5' 10.67\").    Weight as of this encounter: 125.2 kg (276 lb).               Return in about 4 weeks (around 9/4/2019) for mood.    Antonieta Gagnon PA-C  [facility]"

## 2019-08-08 RX ORDER — ALBUTEROL SULFATE 90 UG/1
2 AEROSOL, METERED RESPIRATORY (INHALATION) EVERY 4 HOURS PRN
Qty: 8.5 G | Refills: 0 | Status: SHIPPED | OUTPATIENT
Start: 2019-08-08 | End: 2020-01-04

## 2019-08-08 RX ORDER — MONTELUKAST SODIUM 10 MG/1
10 TABLET ORAL AT BEDTIME
Qty: 90 TABLET | Refills: 0 | Status: SHIPPED | OUTPATIENT
Start: 2019-08-08 | End: 2020-03-13

## 2019-08-08 ASSESSMENT — ASTHMA QUESTIONNAIRES: ACT_TOTALSCORE: 20

## 2019-08-08 NOTE — TELEPHONE ENCOUNTER
Prescription approved per [facility] Refill Protocol.  Refilled to zain Butts,Clinic Rn  [place]

## 2019-08-08 NOTE — TELEPHONE ENCOUNTER
ACT Total Scores 5/13/2019 8/7/2019   ACT TOTAL SCORE (Goal Greater than or Equal to 20) 14 20   In the past 12 months, how many times did you visit the emergency room for your asthma without being admitted to the hospital? 0 0   In the past 12 months, how many times were you hospitalized overnight because of your asthma? 0 0

## 2019-08-13 DIAGNOSIS — J45.20 MILD INTERMITTENT ASTHMA, UNSPECIFIED WHETHER COMPLICATED: ICD-10-CM

## 2019-08-13 NOTE — TELEPHONE ENCOUNTER
Requested Prescriptions   Pending Prescriptions Disp Refills     ipratropium (ATROVENT) 0.03 % nasal spray 30 mL 0     Sig: Spray 2 sprays into both nostrils 2 times daily       There is no refill protocol information for this order         Last Written Prescription Date:  7/11/19  Last Fill Quantity: 30,   # refills: 0  Last Office Visit: 5/13/19  Future Office visit:    Next 5 appointments (look out 90 days)    Oct 09, 2019  9:00 AM CDT  Return Visit with Otto Calzada [facility] ([facility]) [address] [place] [address]  [phone]   Oct 16, 2019  8:00 AM CDT  Return Visit with Otto Calzada [facility] ([address] [address]  [phone]           Routing refill request to provider for review/approval because:  Drug not on the G, P or [facility] refill protocol or controlled substance

## 2019-08-15 RX ORDER — IPRATROPIUM BROMIDE 21 UG/1
2 SPRAY, METERED NASAL 2 TIMES DAILY
Qty: 30 ML | Refills: 0 | Status: SHIPPED | OUTPATIENT
Start: 2019-08-15 | End: 2019-09-18

## 2019-09-16 DIAGNOSIS — J45.20 MILD INTERMITTENT ASTHMA, UNSPECIFIED WHETHER COMPLICATED: ICD-10-CM

## 2019-09-16 NOTE — TELEPHONE ENCOUNTER
Requested Prescriptions   Pending Prescriptions Disp Refills     ipratropium (ATROVENT) 0.03 % nasal spray 30 mL 0     Sig: Spray 2 sprays into both nostrils 2 times daily       There is no refill protocol information for this order         Last Written Prescription Date:  8/15/19  Last Fill Quantity: 30,   # refills: 0  Last Office Visit: 5/13/19  Future Office visit:       Routing refill request to provider for review/approval because:  Drug not on the [facility] or [facility] refill protocol or controlled substance

## 2019-09-18 RX ORDER — IPRATROPIUM BROMIDE 21 UG/1
2 SPRAY, METERED NASAL 2 TIMES DAILY
Qty: 30 ML | Refills: 0 | Status: SHIPPED | OUTPATIENT
Start: 2019-09-18 | End: 2019-10-22

## 2019-10-08 DIAGNOSIS — J45.20 MILD INTERMITTENT ASTHMA, UNSPECIFIED WHETHER COMPLICATED: ICD-10-CM

## 2019-10-08 RX ORDER — ALBUTEROL SULFATE 90 UG/1
2 AEROSOL, METERED RESPIRATORY (INHALATION) EVERY 4 HOURS PRN
Qty: 8.5 G | Refills: 3 | Status: SHIPPED | OUTPATIENT
Start: 2019-10-08 | End: 2020-02-13

## 2019-10-08 NOTE — TELEPHONE ENCOUNTER
"Requested Prescriptions   Pending Prescriptions Disp Refills     albuterol (VENTOLIN HFA) 108 (90 Base) MCG/ACT inhaler 8.5 g 0     Sig: Inhale 2 puffs into the lungs every 4 hours as needed for shortness of breath / dyspnea or wheezing   Last Written Prescription Date:  8/8/19  Last Fill Quantity: 8.5,  # refills: 0   Last office visit: 8/7/2019 with prescribing provider:     Future Office Visit:        Asthma Maintenance Inhalers - Anticholinergics Passed - 10/8/2019  9:54 AM        Passed - Patient is age 12 years or older        Passed - Asthma control assessment score within normal limits in last 6 months     Please review ACT score.   ACT Total Scores 5/13/2019 8/7/2019   ACT TOTAL SCORE (Goal Greater than or Equal to 20) 14 20   In the past 12 months, how many times did you visit the emergency room for your asthma without being admitted to the hospital? 0 0   In the past 12 months, how many times were you hospitalized overnight because of your asthma? 0 0             Passed - Medication is active on med list        Passed - Recent (6 mo) or future (30 days) visit within the authorizing provider's specialty     Patient had office visit in the last 6 months or has a visit in the next 30 days with authorizing provider or within the authorizing provider's specialty.  See \"Patient Info\" tab in inbasket, or \"Choose Columns\" in Meds & Orders section of the refill encounter.              "

## 2019-10-09 ENCOUNTER — TELEPHONE (OUTPATIENT)
Dept: FAMILY MEDICINE | Facility: CLINIC | Age: 34
End: 2019-10-09

## 2019-10-09 NOTE — TELEPHONE ENCOUNTER
Prior Authorization Retail Medication Request    Medication/Dose: Albuterol HFS INH 8.5 MG  ICD code (if different than what is on RX):    Previously Tried and Failed:    Rationale:      Insurance Name:  [employer] [facility]  Insurance ID:  [id number]      Pharmacy Information (if different than what is on RX)  Name:  [facility]  Phone:  [phone]    Katlin Alexis CMA on 10/9/2019 at 8:25 AM

## 2019-10-11 NOTE — TELEPHONE ENCOUNTER
Prior Authorization Not Needed per Insurance    Medication: Albuterol HFS INH 8.5 MG  Insurance Company: [employer] [place] - Phone [phone] Fax [phone]  Expected CoPay:      Pharmacy Filling the Rx: [facility] #[id number] - [place], [place] - [address] AT [facility]  Pharmacy Notified: Yes  Patient Notified: Yes **Instructed pharmacy to notify patient when script is ready to /ship.**    I called pharmacy and they were able to get a paid claim.

## 2019-10-11 NOTE — TELEPHONE ENCOUNTER
Central Prior Authorization Team   Phone: [phone]      PA Initiation    Medication: Albuterol HFS INH 8.5 MG  Insurance Company: [employer] [place] - Phone [phone] Fax [phone]  Pharmacy Filling the Rx: [facility] #[id number] - [place], [place] - [address] AT [facility] [address]  Filling Pharmacy Phone: [phone]  Filling Pharmacy Fax:    Start Date: 10/11/2019

## 2019-10-22 DIAGNOSIS — J45.20 MILD INTERMITTENT ASTHMA, UNSPECIFIED WHETHER COMPLICATED: ICD-10-CM

## 2019-10-22 RX ORDER — IPRATROPIUM BROMIDE 21 UG/1
2 SPRAY, METERED NASAL 2 TIMES DAILY
Qty: 30 ML | Refills: 0 | Status: SHIPPED | OUTPATIENT
Start: 2019-10-22 | End: 2019-12-04

## 2019-10-22 NOTE — TELEPHONE ENCOUNTER
Routing refill request to provider for review/approval because:  Drug not on the [facility] refill protocol   Delphine Black RN,BSN  [facility]

## 2019-10-22 NOTE — TELEPHONE ENCOUNTER
Requested Prescriptions   Pending Prescriptions Disp Refills     ipratropium (ATROVENT) 0.03 % nasal spray 30 mL 0     Sig: Spray 2 sprays into both nostrils 2 times daily       There is no refill protocol information for this order   Last Written Prescription Date:  9/18/19  Last Fill Quantity: 30,  # refills: 0   Last office visit: 8/7/2019 with prescribing provider:     Future Office Visit:

## 2019-11-03 DIAGNOSIS — J45.20 MILD INTERMITTENT ASTHMA, UNSPECIFIED WHETHER COMPLICATED: ICD-10-CM

## 2019-11-04 RX ORDER — SERTRALINE HYDROCHLORIDE 100 MG/1
TABLET, FILM COATED ORAL
Qty: 30 TABLET | Refills: 0 | Status: SHIPPED | OUTPATIENT
Start: 2019-11-04 | End: 2019-12-02

## 2019-11-04 NOTE — TELEPHONE ENCOUNTER
"Requested Prescriptions   Pending Prescriptions Disp Refills     sertraline (ZOLOFT) 100 MG tablet [Pharmacy Med Name: SERTRALINE 100MG TABLETS] 30 tablet 0     Sig: TAKE 1 TABLET(100 MG) BY MOUTH DAILY   Last Written Prescription Date:  8/7/19  Last Fill Quantity: 30,  # refills: 1   Last office visit: 8/7/2019 with prescribing provider:     Future Office Visit:        SSRIs Protocol Passed - 11/3/2019  5:36 PM        Passed - Recent (12 mo) or future (30 days) visit within the authorizing provider's specialty     Patient has had an office visit with the authorizing provider or a provider within the authorizing providers department within the previous 12 mos or has a future within next 30 days. See \"Patient Info\" tab in inbasket, or \"Choose Columns\" in Meds & Orders section of the refill encounter.              Passed - Medication is active on med list        Passed - Patient is age 18 or older          "

## 2019-11-04 NOTE — TELEPHONE ENCOUNTER
Note to pharmacy to inform the patient they are due to be seen.   30 day lindsey given.    Miriam Courtney RN

## 2019-12-03 ENCOUNTER — MYC MEDICAL ADVICE (OUTPATIENT)
Dept: FAMILY MEDICINE | Facility: CLINIC | Age: 34
End: 2019-12-03

## 2019-12-04 DIAGNOSIS — J45.20 MILD INTERMITTENT ASTHMA, UNSPECIFIED WHETHER COMPLICATED: ICD-10-CM

## 2019-12-04 RX ORDER — IPRATROPIUM BROMIDE 21 UG/1
2 SPRAY, METERED NASAL 2 TIMES DAILY
Qty: 30 ML | Refills: 0 | Status: SHIPPED | OUTPATIENT
Start: 2019-12-04 | End: 2020-02-13

## 2019-12-04 NOTE — TELEPHONE ENCOUNTER
Requested Prescriptions   Pending Prescriptions Disp Refills     ipratropium (ATROVENT) 0.03 % nasal spray 30 mL 0     Sig: Spray 2 sprays into both nostrils 2 times daily       There is no refill protocol information for this order   Last Written Prescription Date:  10-22-19  Last Fill Quantity: 30ml,  # refills: 0   Last office visit: 8/7/2019 with prescribing provider:  5-13-19   Future Office Visit:

## 2019-12-04 NOTE — TELEPHONE ENCOUNTER
Routing refill request to provider for review/approval because:  Drug not on the [facility] refill protocol   Delphine Black RN,BSN  [facility]

## 2020-01-04 ENCOUNTER — OFFICE VISIT (OUTPATIENT)
Dept: FAMILY MEDICINE | Facility: CLINIC | Age: 35
End: 2020-01-04
Payer: COMMERCIAL

## 2020-01-04 VITALS
DIASTOLIC BLOOD PRESSURE: 80 MMHG | RESPIRATION RATE: 20 BRPM | HEART RATE: 86 BPM | SYSTOLIC BLOOD PRESSURE: 128 MMHG | WEIGHT: 287 LBS | HEIGHT: 70 IN | OXYGEN SATURATION: 99 % | BODY MASS INDEX: 41.09 KG/M2

## 2020-01-04 DIAGNOSIS — F33.41 RECURRENT MAJOR DEPRESSIVE DISORDER, IN PARTIAL REMISSION (H): Primary | ICD-10-CM

## 2020-01-04 DIAGNOSIS — F10.10 ALCOHOL ABUSE: ICD-10-CM

## 2020-01-04 DIAGNOSIS — J45.20 MILD INTERMITTENT ASTHMA, UNSPECIFIED WHETHER COMPLICATED: ICD-10-CM

## 2020-01-04 DIAGNOSIS — E66.01 MORBID OBESITY (H): ICD-10-CM

## 2020-01-04 PROCEDURE — 99214 OFFICE O/P EST MOD 30 MIN: CPT | Performed by: FAMILY MEDICINE

## 2020-01-04 RX ORDER — SERTRALINE HYDROCHLORIDE 100 MG/1
100 TABLET, FILM COATED ORAL DAILY
Qty: 90 TABLET | Refills: 1 | Status: SHIPPED | OUTPATIENT
Start: 2020-01-04 | End: 2020-05-15

## 2020-01-04 ASSESSMENT — ANXIETY QUESTIONNAIRES
GAD7 TOTAL SCORE: 5
2. NOT BEING ABLE TO STOP OR CONTROL WORRYING: SEVERAL DAYS
1. FEELING NERVOUS, ANXIOUS, OR ON EDGE: NOT AT ALL
3. WORRYING TOO MUCH ABOUT DIFFERENT THINGS: NOT AT ALL
4. TROUBLE RELAXING: SEVERAL DAYS
GAD7 TOTAL SCORE: 5
5. BEING SO RESTLESS THAT IT IS HARD TO SIT STILL: NOT AT ALL
GAD7 TOTAL SCORE: 5
7. FEELING AFRAID AS IF SOMETHING AWFUL MIGHT HAPPEN: SEVERAL DAYS
7. FEELING AFRAID AS IF SOMETHING AWFUL MIGHT HAPPEN: SEVERAL DAYS
6. BECOMING EASILY ANNOYED OR IRRITABLE: MORE THAN HALF THE DAYS

## 2020-01-04 ASSESSMENT — PATIENT HEALTH QUESTIONNAIRE - PHQ9
10. IF YOU CHECKED OFF ANY PROBLEMS, HOW DIFFICULT HAVE THESE PROBLEMS MADE IT FOR YOU TO DO YOUR WORK, TAKE CARE OF THINGS AT HOME, OR GET ALONG WITH OTHER PEOPLE: SOMEWHAT DIFFICULT
SUM OF ALL RESPONSES TO PHQ QUESTIONS 1-9: 11
SUM OF ALL RESPONSES TO PHQ QUESTIONS 1-9: 11

## 2020-01-04 ASSESSMENT — MIFFLIN-ST. JEOR: SCORE: 2248.07

## 2020-01-04 NOTE — PROGRESS NOTES
Subjective  Answers for HPI/ROS submitted by the patient on 1/4/2020   If you checked off any problems, how difficult have these problems made it for you to do your work, take care of things at home, or get along with other people?: Somewhat difficult  PHQ9 TOTAL SCORE: 11  EVERETTE 7 TOTAL SCORE: 5      Jorge Madrigal is a 34 year old male who presents to clinic today for the following health issues:    HPI   New Patient/Transfer of Care  Alcohol abuse      Duration: years    Description (location/character/radiation): Pt drinks 1-2 pints of hard liquor or bottle of wine on a daily basis    Intensity:  moderate    Accompanying signs and symptoms: none    History (similar episodes/previous evaluation): Has been ongoing.  He has never had any DWI or job related problems.  He does get into arguments with his wife about drinking.  They have a 1 yr old child at home    Precipitating or alleviating factors: None    Therapies tried and outcome: Pt stopped drinking for about 5 mo but then started again   Pt denies any other chemical use  He has family Hx of alcoholism with both of his parents    He is now asking about chemical dependency evaluation    Depression and Anxiety Follow-Up    How are you doing with your depression since your last visit? Improved with medication    How are you doing with your anxiety since your last visit?  Improved stable    Are you having other symptoms that might be associated with depression or anxiety? No    Have you had a significant life event? No     Do you have any concerns with your use of alcohol or other drugs? Yes:  alcohol    Social History     Tobacco Use     Smoking status: Never Smoker     Smokeless tobacco: Never Used     Tobacco comment: Occassionally   Substance Use Topics     Alcohol use: Yes     Drug use: Never     PHQ 8/7/2019 1/4/2020   PHQ-9 Total Score 9 11   Q9: Thoughts of better off dead/self-harm past 2 weeks Not at all Not at all     EVERETTE-7 SCORE 1/4/2020   Total  Score 5 (mild anxiety)   Total Score 5     Last PHQ-9 1/4/2020   1.  Little interest or pleasure in doing things 1   2.  Feeling down, depressed, or hopeless 1   3.  Trouble falling or staying asleep, or sleeping too much 1   4.  Feeling tired or having little energy 2   5.  Poor appetite or overeating 3   6.  Feeling bad about yourself 2   7.  Trouble concentrating 1   8.  Moving slowly or restless 0   Q9: Thoughts of better off dead/self-harm past 2 weeks 0   PHQ-9 Total Score 11   Difficulty at work, home, or with people -     EVERETTE-7  1/4/2020   1. Feeling nervous, anxious, or on edge 0   2. Not being able to stop or control worrying 1   3. Worrying too much about different things 0   4. Trouble relaxing 1   5. Being so restless that it is hard to sit still 0   6. Becoming easily annoyed or irritable 2   7. Feeling afraid, as if something awful might happen 1   EVERETTE-7 Total Score 5     In the past two weeks have you had thoughts of suicide or self-harm?  No.    Do you have concerns about your personal safety or the safety of others?   No    Suicide Assessment Five-step Evaluation and Treatment (SAFE-T)    Asthma Follow-Up    Was ACT completed today?  No      Do you have a cough?  No    Are you experiencing any wheezing in your chest?  No    Do you have any shortness of breath?  No     How often are you using a short-acting (rescue) inhaler or nebulizer, such as Albuterol?  A few times a week    How many days per week do you miss taking your asthma controller medication?  0    Please describe any recent triggers for your asthma: upper respiratory infections and pollens    Have you had any Emergency Room Visits, Urgent Care Visits, or Hospital Admissions since your last office visit?  No      BP Readings from Last 3 Encounters:   01/04/20 128/80   08/07/19 131/76   05/13/19 122/83    Wt Readings from Last 3 Encounters:   01/04/20 130.2 kg (287 lb)   08/07/19 125.2 kg (276 lb)   05/13/19 122.5 kg (270 lb)           "            Reviewed and updated as needed this visit by Provider         Review of Systems   ROS COMP: CONSTITUTIONAL: NEGATIVE for fever, chills, change in weight  ENT/MOUTH: NEGATIVE for ear, mouth and throat problems  RESP:NEGATIVE for significant cough or SOB and POSITIVE for Hx asthma  CV: NEGATIVE for chest pain, palpitations or peripheral edema  PSYCHIATRIC: NEGATIVE for changes in mood or affect and POSITIVE foralcohol abuse, Hx anxiety and Hx depression      Objective    /80 (BP Location: Left arm, Patient Position: Chair, Cuff Size: Adult Large)   Pulse 86   Resp 20   Ht 1.778 m (5' 10\")   Wt 130.2 kg (287 lb)   SpO2 99%   BMI 41.18 kg/m    Body mass index is 41.18 kg/m .  Physical Exam   GENERAL: healthy, alert and no distress  NECK: no adenopathy, no asymmetry, masses, or scars and thyroid normal to palpation  RESP: lungs clear to auscultation - no rales, rhonchi or wheezes  CV: regular rate and rhythm, normal S1 S2, no S3 or S4, no murmur, click or rub, no peripheral edema and peripheral pulses strong  ABDOMEN: soft, nontender, no hepatosplenomegaly, no masses and bowel sounds normal  PSYCH: mentation appears normal and affect normal/bright    Diagnostic Test Results:  Labs reviewed in Epic  none         Assessment & Plan     Jorge was seen today for establish care.    Diagnoses and all orders for this visit:    Recurrent major depressive disorder, in partial remission (H)    Morbid obesity (H)    Alcohol abuse  -     MENTAL HEALTH REFERRAL  - Adult; Addiction Medicine Provider; Addiction Medicine Evaluation & Treatment; Addiction Medicine Consultation, Evaluation & Treatment [phone]; Alcohol; Medication Assisted Treatment: Alcohol; We will contact you t...    Mild intermittent asthma, unspecified whether complicated  -     sertraline (ZOLOFT) 100 MG tablet; Take 1 tablet (100 mg) by mouth daily Due for office visit before further refills         BMI:   Estimated body mass index is " "41.18 kg/m  as calculated from the following:    Height as of this encounter: 1.778 m (5' 10\").    Weight as of this encounter: 130.2 kg (287 lb).   Weight management plan: Discussed healthy diet and exercise guidelines        FUTURE APPOINTMENTS:       - Follow-up visit in 6 mo  Referral to Mental Health for Addition medicine consult  Patient Instructions   Work at losing weight  Stop drinking alcohol      Return in about 6 months (around 7/4/2020) for Depression, asthma.    Phil Lao MD  [facility]      "

## 2020-01-05 ASSESSMENT — ANXIETY QUESTIONNAIRES: GAD7 TOTAL SCORE: 5

## 2020-01-06 ENCOUNTER — TELEPHONE (OUTPATIENT)
Dept: ADDICTION MEDICINE | Facility: CLINIC | Age: 35
End: 2020-01-06

## 2020-01-06 NOTE — TELEPHONE ENCOUNTER
Please review referral. Please route back to reception pool #[id number].     Thank you,    Karon Zavala  [facility]

## 2020-01-06 NOTE — TELEPHONE ENCOUNTER
Pt is scheduled with Aleena Bah on 01/16 @ 8am @ [facility] . Closing encounter as no further follow up is needed.     Karon Zavala  [facility]

## 2020-01-06 NOTE — TELEPHONE ENCOUNTER
Please schedule appointment for patient with  Addiction Medicine Provider   For alcohol use.

## 2020-01-15 NOTE — PROGRESS NOTES
SUBJECTIVE                                                    CC: Patient presents with:  Addiction Problem    Primary care provider: [facility]  Psychiatric provider or therapist: Dr. Vitor Bains psychologist at [facility] in [place].     [place] Board of Pharmacy Data Base Reviewed:  Yes; reviewed today and no concerns for diversionary activity.  No data in  records for the last year.     HPI:    Jorge Madrigal is a 34 year old male with a history of alcohol use disorder, opioid abuse, and major depression who presents for initial visit for addiction consultation and management referred by Dr. Lao.    He reports first alcohol use at age 12.  Notes that his use has been problematic for years but he didn't notice until the last year.  Most recently had been drinking 1-2 pints of liquor or a bottle of wine nearly daily for the last year.  Prior to that drinking was usually isolated to Fri-Sunday nights.  States drinking slowly ramps up over time and repeats this cycle of use with some days of not drinking between cycles.  He hasn't drank since 1/7/2020 after discussion with his psychologist about his use warranting diagnosis of alcohol use disorder and encouraged complete abstinence.  He reports a significant history of polysubstance abuse with primary concern currently for alcohol.     Reports severeal arguments with his wife regarding his alcohol use but no job or legal issues.  Denies withdrawal symptoms when he stops drinking.  He endorses cravings to drink, tolerance, failed attempts to cut back, and continued use despite negative consequences to his relationship.   He also reports history of using opioid pain pills.  First use at age 24 buying them off the street.  He was taking Oxycontin and hydrocodone in various amounts and would use them for 4 or 5 days at a time, snorting and swallowing them.  He reports he would sometimes go through opioid withdrawal.  Last  use 04/2019 prior to moving to [place].  States he doesn't have a source in [place] and that has helped him to stop using.  Occasionally still has cravings for opioids but they are manageable.  States his parents both had alcohol and drug use problems.     He is taking sertraline 100 mg daily for his mood for the last 2 years or so and states he has noticed an improvement in mood overall while taking this.  Denies side effects.  Mood currently low that has worsened since he quit drinking on 1/7/2020.  Also endorses low energy, poor focus, impaired sleep (improving), and interest is diminished.  He reports first MDD episode as an adolescent with several since then outside of substance use or withdrawal.  No mental health hospitalizations, SI or SA.  Reports no issues in his lifetime with uncontrolled anxiety.  He reports that he hurt his back yesterday at work and pain is worse with sitting and relieved by lying down or standing.  He has been taking flexeril which reduces pain.         CD History:     SUBSTANCE(S)  OF CHOICE  - alcohol       Date of last use  - 1/7/2020   History of use - See HPI  Withdrawal symptoms - none from alcohol except disturbed sleep; has experienced opioid withdrawal several times in the past   IV drug use - Denies  Previous MAT (Suboxone/Vivitrol/Methadone) - never  Previous detox/treatment - None  Longest period of sobriety - 1 month   Medical complications from substance use (ie. infection, organ damage, seizures) - denies  History of overdose - Denies  Narcan currently available - Denies    Other Substance Use:  ALCOHOL - see HPI  OPIATES - First use age 24.  Last use April 2019; buying OxyContin and hydrocodone - would swallow and snort them.  Stopped using due to not having access to them.  Would use them in episodes of 4-5 day periods.  Go through withdrawal and then stop for a few weeks.  Sometimes still has cravings for them but able to abstain.   BENZODIAZEPINES - recreationally but  "\"I don't like them\"   AMPHETAMINES/METHAMPHETAMINES - denies  MARIJUANA  - last use a few years ago; used to smoke daily until age 25 \"stopped for some reason\"   COCAINE - denies   HALLUCINOGENS - mushroom and acid - last use 2016; was using regularly in the past.  First use age 14.   ANABOLIC STEROIDS - denies    OTHER  (Gambling, shopping) - denies     NICOTINE- never used     Hepatitis C Status - never tested, never used IV      PAST PSYCHIATRIC HISTORY  Past diagnoses - recurrent major depression  Hospitalizations/commitment - denies  Suicide Attempts - denies  Psychotherapy/ECT/TMS - in individual therapy   Medication trials - sertraline 100 mg daily, Wellbutrin - \"it was awful\" and significant side effects such as tremors and panic symptoms and only took it one day.      MEDICAL HISTORY:  Problem list, allergies, and histories reviewed & adjusted, as indicated.  Additional history: as documented     Patient Active Problem List    Diagnosis Date Noted     Morbid obesity (H) 01/04/2020     Priority: Medium     Screening examination for pulmonary tuberculosis 06/07/2019     Priority: Medium     Moderate persistent asthma without complication      Priority: Medium     GERD (gastroesophageal reflux disease)      Priority: Medium     Recurrent major depressive disorder, in partial remission (H)      Priority: Medium       Past Medical History:   Diagnosis Date     Asthma      Depression      Depressive disorder      GERD (gastroesophageal reflux disease)        Past Surgical History:   Procedure Laterality Date     COLONOSCOPY  2015    Normal         Family History   Problem Relation Age of Onset     Colon Cancer Mother 60     Substance Abuse Mother      Mental Illness Mother      Substance Abuse Father      Mental Illness Father      Suicide No family hx of        Social History     Social History Narrative    Moved to [place] in April 2019 with his wife from [place] for friends and loved [place].  Grew up in [place] " with both parents who were alcoholics and used drugs.  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 Him and his wife have a 1 year old daughter and live together in [place].        ALLERGIES & CURRENT MEDICATIONS:  No Known Allergies    Current Outpatient Medications   Medication Sig Dispense Refill     albuterol (VENTOLIN HFA) 108 (90 Base) MCG/ACT inhaler Inhale 2 puffs into the lungs every 4 hours as needed for shortness of breath / dyspnea or wheezing 8.5 g 3     cetirizine (ZYRTEC) 10 MG tablet Take 10 mg by mouth daily       fluticasone (FLONASE) 50 MCG/ACT nasal spray Spray 1 spray into both nostrils daily       fluticasone-salmeterol (ADVAIR) 500-50 MCG/DOSE inhaler Inhale 1 puff into the lungs every 12 hours 1 Inhaler 11     ipratropium (ATROVENT) 0.03 % nasal spray Spray 2 sprays into both nostrils 2 times daily 30 mL 0     montelukast (SINGULAIR) 10 MG tablet Take 1 tablet (10 mg) by mouth At Bedtime 90 tablet 0     OMEPRAZOLE PO Take 20 mg by mouth       sertraline (ZOLOFT) 100 MG tablet Take 1 tablet (100 mg) by mouth daily Due for office visit before further refills 90 tablet 1       REVIEW OF SYSTEMS:  General: Denies acute withdrawal symptoms.  No recent infections or fever  Eyes:  No vision concerns.  No double vision.    Resp: No coughing, wheezing or shortness of breath  CV: No chest pains or palpitations  GI: No nausea, vomiting, abdominal pain, diarrhea.  No constipation  : No urinary frequency or dysuria    Musculoskeletal: +++generalized fatigue; No significant muscle or joint pains other than as above (+++back pain).  No edema  Neurologic: No numbness, tingling, weakness, problems with balance or coordination  Psychiatric: No acute concerns other than as above. See mental status exam for more information.   Skin: No rashes or areas of acute infection    OBJECTIVE                                                      LABS:  Labs reviewed.  Pertinent data  include:   Urine tox results POS: tricyclic antidepressants today.     Results for orders placed or performed in visit on 01/16/20   Urine Drugs of Abuse Screen Panel 13     Status: Abnormal   Result Value Ref Range    Cannabinoids (51-umk-4-carboxy-9-THC) Not Detected NDET^Not Detected ng/mL    Phencyclidine (Phencyclidine) Not Detected NDET^Not Detected ng/mL    Cocaine (Benzoylecgonine) Not Detected NDET^Not Detected ng/mL    Methamphetamine (d-Methamphetamine) Not Detected NDET^Not Detected ng/mL    Opiates (Morphine) Not Detected NDET^Not Detected ng/mL    Amphetamine (d-Amphetamine) Not Detected NDET^Not Detected ng/mL    Benzodiazepines (Nordiazepam) Not Detected NDET^Not Detected ng/mL    Tricyclic Antidepressants (Desipramine) Detected, Abnormal Result (A) NDET^Not Detected ng/mL    Methadone (Methadone) Not Detected NDET^Not Detected ng/mL    Barbiturates (Butalbital) Not Detected NDET^Not Detected ng/mL    Oxycodone (Oxycodone) Not Detected NDET^Not Detected ng/mL    Propoxyphene (Norpropoxyphene) Not Detected NDET^Not Detected ng/mL    Buprenorphine (Buprenorphine) Not Detected NDET^Not Detected ng/mL       PHYSICAL EXAM:  /79   Pulse 56   Temp 97.5  F (36.4  C) (Oral)   Wt 128.4 kg (283 lb)   SpO2 97%   BMI 40.61 kg/m      GENERAL APPEARANCE: alert but fatigued, male who appears his stated age   EYES: Eyes grossly normal to inspection  HENT: TM's normal, mouth without ulcers or lesions, nose no rhinorrhea  NECK: no adenopathy, thyromegaly or masses  RESP: lungs clear to auscultation - no rales, rhonchi or wheezes and no resp distress  CV: regular rates and rhythm, normal S1 S2,no murmur   ABDOMEN: soft, nontender, without hepatosplenomegaly or masses  MS: extremities normal- no gross deformities noted, gait normal, peripheral pulses normal and no edema  SKIN: no rashes, no jaundice, no obvious masses.   NEURO: Normal strength and tone, sensory exam grossly normal, no tremor    MENTAL STATUS  EXAM:  Appearance/Behavior:No appearant distress  Speech: Normal  Mood/Affect: depressed affect  Insight: Adequate    ASSESSMENT/PLAN                                                      (F10.20) Alcohol use disorder, moderate, dependence (H)  (primary encounter diagnosis)  Comment: Last use 1/7/2020 and interested in complete abstinence.  Endorses some cravings for alcohol, denies hx of withdrawal symptoms.  Discussed definition of AUD and recommendations for complete abstinence.  AA Alternative meetings provided to patient as he is not interested in the AA model.   Plan:  Start taking Naltrexone 25 mg daily for 1 week, then increase to 1 tablet daily.  Encouraged meeting attendance.         Labs include Comprehensive metabolic panel, CBC with         Platelets    (F11.11) History of opioid abuse (H)  Comment: None since 04/2019 before moving to [place].  Sometimes still having cravings but manageable and no supply of opioids in [place].   Plan: Start Naltrexone as above.     (F33.1) Moderate episode of recurrent major depressive disorder (H)  Comment: Mood has improved with sertraline 100 mg daily.  Also was drinking most of the time he has been taking this.  Denies SI, SIB.  Mood and energy particularly low with recent alcohol cessation.   Plan: Continue Sertraline through PCP and will draw TSH with free T4 reflex FUTURE 2mo, and Vitamin         B12 levels today.        Follow-Up: 1 month or sooner as needed.       Patient counseling completed today:  Addiction Services expectations were reviewed and a copy was provided to patient at the completion of today's visit.  The expectations addressed include; routine urine drug screens, frequency of office visits, cancellations/no-shows, and clinic contact information.  The patient was notified that our clinic has 72 business hours to complete any forms and a minimum of 24 business hours to address any medication refill requests.  Questions regarding these  expectations were answered and the patient verbalizes an understanding and acceptance of the above expectations.     Medications for alcohol dependence reviewed and include the following:  Disulfiram (Antabuse), Acamprosate (Campral) and Naltrexone (Revia/Vivitrol) all reviewed.    Risk, benefit, side effects and intended purposes of each medication reviewed.     Effect of Naltrexone/Vivitrol with opioid use reviewed.       Patient is interested in starting oral Naltrexone.     Counseled the patient on the importance of having a recovery program in addition to medication assisted treatment (MAT).  Components of a recovery program include having some type of sober network, avoiding isolating, willingness to change, avoiding triggers, and managing cravings.  Recommended to abstain from alcohol, benzodiazepines, THC, opioids, and other drugs of abuse.  Information on alternative recovery meetings that do not utilize the AA philosophy were provided to Jorge at this time.       Aleena Bah CNP  [facility] - Addiction Medicine  [facility]  [phone]

## 2020-01-16 ENCOUNTER — OFFICE VISIT (OUTPATIENT)
Dept: ADDICTION MEDICINE | Facility: CLINIC | Age: 35
End: 2020-01-16
Payer: COMMERCIAL

## 2020-01-16 VITALS
OXYGEN SATURATION: 97 % | BODY MASS INDEX: 40.61 KG/M2 | SYSTOLIC BLOOD PRESSURE: 123 MMHG | HEART RATE: 56 BPM | TEMPERATURE: 97.5 F | WEIGHT: 283 LBS | DIASTOLIC BLOOD PRESSURE: 79 MMHG

## 2020-01-16 DIAGNOSIS — F10.20 ALCOHOL USE DISORDER, MODERATE, DEPENDENCE (H): Primary | ICD-10-CM

## 2020-01-16 DIAGNOSIS — F11.11 HISTORY OF OPIOID ABUSE (H): ICD-10-CM

## 2020-01-16 DIAGNOSIS — F33.1 MODERATE EPISODE OF RECURRENT MAJOR DEPRESSIVE DISORDER (H): ICD-10-CM

## 2020-01-16 LAB
AMPHETAMINES UR QL: NOT DETECTED NG/ML
BARBITURATES UR QL SCN: NOT DETECTED NG/ML
BENZODIAZ UR QL SCN: NOT DETECTED NG/ML
BUPRENORPHINE UR QL: NOT DETECTED NG/ML
CANNABINOIDS UR QL: NOT DETECTED NG/ML
COCAINE UR QL SCN: NOT DETECTED NG/ML
D-METHAMPHET UR QL: NOT DETECTED NG/ML
ERYTHROCYTE [DISTWIDTH] IN BLOOD BY AUTOMATED COUNT: 12.8 % (ref 10–15)
HCT VFR BLD AUTO: 44.5 % (ref 40–53)
HGB BLD-MCNC: 15.2 G/DL (ref 13.3–17.7)
MCH RBC QN AUTO: 31.9 PG (ref 26.5–33)
MCHC RBC AUTO-ENTMCNC: 34.2 G/DL (ref 31.5–36.5)
MCV RBC AUTO: 94 FL (ref 78–100)
METHADONE UR QL SCN: NOT DETECTED NG/ML
OPIATES UR QL SCN: NOT DETECTED NG/ML
OXYCODONE UR QL SCN: NOT DETECTED NG/ML
PCP UR QL SCN: NOT DETECTED NG/ML
PLATELET # BLD AUTO: 203 10E9/L (ref 150–450)
PROPOXYPH UR QL: NOT DETECTED NG/ML
RBC # BLD AUTO: 4.76 10E12/L (ref 4.4–5.9)
TRICYCLICS UR QL SCN: ABNORMAL NG/ML
VIT B12 SERPL-MCNC: 378 PG/ML (ref 193–986)
WBC # BLD AUTO: 6.9 10E9/L (ref 4–11)

## 2020-01-16 PROCEDURE — 80306 DRUG TEST PRSMV INSTRMNT: CPT | Performed by: NURSE PRACTITIONER

## 2020-01-16 PROCEDURE — 80053 COMPREHEN METABOLIC PANEL: CPT | Performed by: NURSE PRACTITIONER

## 2020-01-16 PROCEDURE — 36415 COLL VENOUS BLD VENIPUNCTURE: CPT | Performed by: NURSE PRACTITIONER

## 2020-01-16 PROCEDURE — 82607 VITAMIN B-12: CPT | Performed by: NURSE PRACTITIONER

## 2020-01-16 PROCEDURE — 85027 COMPLETE CBC AUTOMATED: CPT | Performed by: NURSE PRACTITIONER

## 2020-01-16 PROCEDURE — 99204 OFFICE O/P NEW MOD 45 MIN: CPT | Performed by: NURSE PRACTITIONER

## 2020-01-16 RX ORDER — NALTREXONE HYDROCHLORIDE 50 MG/1
TABLET, FILM COATED ORAL
Qty: 30 TABLET | Refills: 0 | Status: SHIPPED | OUTPATIENT
Start: 2020-01-16 | End: 2020-02-13

## 2020-01-16 NOTE — PATIENT INSTRUCTIONS
- Start taking Naltrexone 1/2 tablet daily for 1 week then increase to 1 tablet daily.  If it makes you tired take it at bedtime instead of in the morning.       Introduction to [facility] Addiction Services  Things You Need to Know      ? Provide at least 24 hour notice if you are not able to attend your appointment.  Call the clinic if you are running late.  If you arrive more than 15 minutes late you will be asked to provide a urine sample and it will be determined at that time if you can be seen or asked to reschedule.      ? If you miss your scheduled appointment you will need to call the clinic to be rescheduled for the next available appointment.       ? Any medications will require frequent follow-up appointments initially.  You may be required to be seen more often depending on your progress.      ? You are required to provide a urine sample before every appointment.  You will not be roomed to see the provider until this is completed.  Please let us know of any non-prescription substance(s) that may appear in your urine drug test.     ? Please allow at least 24 business hours to process medication refill requests.  If possible notify the clinic staff 2-3 days before running out of medications.     ? If there is a concern with a medication request we will contact you.  Otherwise, please contact your pharmacy directly to see if your prescription is ready for .  If you receive a call from our clinic please return the call promptly as your prescription may be delayed until we hear from you.     ? It is your responsibility to keep your medication in a safe place away from pets and children.  Lost or stolen medication(s) are generally not replaced.  If medications are stolen we encourage you to file a police report.  Even if medication is prescribed your insurance may not cover early refills.    ? Please allow at least 72 business hours to complete any forms.  When possible please bring the forms to your  scheduled appointments.     ? Make sure our clinic has your contact information with an active phone number so that we can contact you to prevent delays in you getting medications or lab results.       Clinic Hours  Monday - Friday 8:00am - 5:00pm  Clinic Phone: [phone]    If medical care is needed outside of business hours you will need to contact your primary care provider or go to an urgent care or emergency room.  Buprenorphine will not be prescribed outside of business hours.       Patient Education     Patient Education    Naltrexone Hydrochloride Oral tablet    Naltrexone Suspension for injection, Extended Release  Naltrexone Hydrochloride Oral tablet  What is this medicine?  NALTREXONE (nal TREX one) helps you to remain free of your dependence on opiate drugs or alcohol. It blocks the 'high' that these substances can give you. This medicine is combined with counseling and support groups.  This medicine may be used for other purposes; ask your health care provider or pharmacist if you have questions.  What should I tell my health care provider before I take this medicine?  They need to know if you have any of these conditions:    if you have used drugs or alcohol within 7 to 10 days    kidney disease    liver disease, including hepatitis    an unusual or allergic reaction to naltrexone, other medicines, foods, dyes, or preservatives    pregnant or trying to get pregnant    breast-feeding  How should I use this medicine?  Take this medicine by mouth with a full glass of water. Follow the directions on the prescription label. Do not take this medicine within 7 to 10 days of taking any opioid drugs. Take your medicine at regular intervals. Do not take your medicine more often than directed. Do not stop taking except on your doctor's advice.  Talk to your pediatrician regarding the use of this medicine in children. Special care may be needed.  Overdosage: If you think you have taken too much of this medicine  contact a poison control center or emergency room at once.  NOTE: This medicine is only for you. Do not share this medicine with others.  What if I miss a dose?  If you miss a dose and remember on the same day, take the missed dose. If you do not remember until the next day, ask your doctor or health care professional about rescheduling your doses. Do not take double or extra doses.  What may interact with this medicine?  Do not take this medicine with any of the following medications:    any prescription or street opioid drug like codiene, heroin, methadone  This medicine may also interact with the following medications:    disulfiram    thioridazine  This list may not describe all possible interactions. Give your health care provider a list of all the medicines, herbs, non-prescription drugs, or dietary supplements you use. Also tell them if you smoke, drink alcohol, or use illegal drugs. Some items may interact with your medicine.  What should I watch for while using this medicine?  Your condition will be monitored carefully while you are receiving this medicine. Visit your doctor or health care professional regularly. For this medicine to be most effective you should attend any counseling or support groups that your doctor or health care professional recommends. Do not try to overcome the effects of the medicine by taking large amounts of narcotics or by drinking large amounts of alcohol. This can cause severe problems including death. Also, you may be more sensitive to lower doses of narcotics after you stop taking this medicine.  If you are going to have surgery, tell your doctor or health care professional that you are taking this medicine.  Do not treat yourself for coughs, colds, pain, or diarrhea. Ask your doctor or health care professional for advice. Some of the ingredients may interact with this medicine and cause side effects.  Wear a medical ID bracelet or chain, and carry a card that describes your  disease and details of your medicine and dosage times.  You may get drowsy or dizzy. Do not drive, use machinery, or do anything that needs mental alertness until you know how this medicine affects you. Do not stand or sit up quickly, especially if you are an older patient. This reduces the risk of dizzy or fainting spells. Alcohol may interfere with the effect of this medicine. Avoid alcoholic drinks.  What side effects may I notice from receiving this medicine?  Side effects that you should report to your doctor or health care professional as soon as possible:    allergic reactions like skin rash, itching or hives, swelling of the face, lips, or tongue    breathing problems    changes in vision, hearing    confusion    dark urine    depressed mood    diarrhea    fast or irregular heart beat    hallucination, loss of contact with reality    light-colored stools    right upper belly pain    suicidal thoughts or other mood changes    unusually weak or tired    vomiting    yellowing of the eyes or skin  Side effects that usually do not require medical attention (report to your doctor or health care professional if they continue or are bothersome):    aches, pains    change in sex drive or performance    feeling anxious    headache    loss of appetite, nausea    runny nose, sinus problems, sneezing    stomach pain    trouble sleeping  This list may not describe all possible side effects. Call your doctor for medical advice about side effects. You may report side effects to FDA at 5-429-FDA-0223.  Where should I keep my medicine?  Keep out of the reach of children.  Store at room temperature between 20 and 25 degrees C (68 and 77 degrees F). Throw away any unused medicine after the expiration date.  NOTE:This sheet is a summary. It may not cover all possible information. If you have questions about this medicine, talk to your doctor, pharmacist, or health care provider. Copyright  2016 [employer]

## 2020-01-17 LAB
ALBUMIN SERPL-MCNC: 4.2 G/DL (ref 3.4–5)
ALP SERPL-CCNC: 73 U/L (ref 40–150)
ALT SERPL W P-5'-P-CCNC: 42 U/L (ref 0–70)
ANION GAP SERPL CALCULATED.3IONS-SCNC: 7 MMOL/L (ref 3–14)
AST SERPL W P-5'-P-CCNC: 14 U/L (ref 0–45)
BILIRUB SERPL-MCNC: 0.4 MG/DL (ref 0.2–1.3)
BUN SERPL-MCNC: 18 MG/DL (ref 7–30)
CALCIUM SERPL-MCNC: 9.4 MG/DL (ref 8.5–10.1)
CHLORIDE SERPL-SCNC: 107 MMOL/L (ref 94–109)
CO2 SERPL-SCNC: 26 MMOL/L (ref 20–32)
CREAT SERPL-MCNC: 1.05 MG/DL (ref 0.66–1.25)
GFR SERPL CREATININE-BSD FRML MDRD: >90 ML/MIN/{1.73_M2}
GLUCOSE SERPL-MCNC: 115 MG/DL (ref 70–99)
POTASSIUM SERPL-SCNC: 4.1 MMOL/L (ref 3.4–5.3)
PROT SERPL-MCNC: 7.7 G/DL (ref 6.8–8.8)
SODIUM SERPL-SCNC: 140 MMOL/L (ref 133–144)

## 2020-02-13 ENCOUNTER — OFFICE VISIT (OUTPATIENT)
Dept: ADDICTION MEDICINE | Facility: CLINIC | Age: 35
End: 2020-02-13
Payer: COMMERCIAL

## 2020-02-13 ENCOUNTER — MYC REFILL (OUTPATIENT)
Dept: FAMILY MEDICINE | Facility: CLINIC | Age: 35
End: 2020-02-13

## 2020-02-13 VITALS
DIASTOLIC BLOOD PRESSURE: 80 MMHG | OXYGEN SATURATION: 98 % | BODY MASS INDEX: 40.61 KG/M2 | TEMPERATURE: 97.6 F | WEIGHT: 283 LBS | SYSTOLIC BLOOD PRESSURE: 121 MMHG | HEART RATE: 69 BPM

## 2020-02-13 DIAGNOSIS — F11.11 HISTORY OF OPIOID ABUSE (H): ICD-10-CM

## 2020-02-13 DIAGNOSIS — F10.20 ALCOHOL USE DISORDER, MODERATE, DEPENDENCE (H): Primary | ICD-10-CM

## 2020-02-13 DIAGNOSIS — F33.1 MODERATE EPISODE OF RECURRENT MAJOR DEPRESSIVE DISORDER (H): ICD-10-CM

## 2020-02-13 DIAGNOSIS — J45.20 MILD INTERMITTENT ASTHMA, UNSPECIFIED WHETHER COMPLICATED: ICD-10-CM

## 2020-02-13 LAB
AMPHETAMINES UR QL: NOT DETECTED NG/ML
BARBITURATES UR QL SCN: NOT DETECTED NG/ML
BENZODIAZ UR QL SCN: NOT DETECTED NG/ML
BUPRENORPHINE UR QL: NOT DETECTED NG/ML
CANNABINOIDS UR QL: NOT DETECTED NG/ML
COCAINE UR QL SCN: NOT DETECTED NG/ML
D-METHAMPHET UR QL: NOT DETECTED NG/ML
METHADONE UR QL SCN: NOT DETECTED NG/ML
OPIATES UR QL SCN: NOT DETECTED NG/ML
OXYCODONE UR QL SCN: NOT DETECTED NG/ML
PCP UR QL SCN: NOT DETECTED NG/ML
PROPOXYPH UR QL: NOT DETECTED NG/ML
TRICYCLICS UR QL SCN: NOT DETECTED NG/ML

## 2020-02-13 PROCEDURE — 80306 DRUG TEST PRSMV INSTRMNT: CPT | Performed by: NURSE PRACTITIONER

## 2020-02-13 PROCEDURE — 99214 OFFICE O/P EST MOD 30 MIN: CPT | Performed by: NURSE PRACTITIONER

## 2020-02-13 RX ORDER — NALTREXONE HYDROCHLORIDE 50 MG/1
TABLET, FILM COATED ORAL
Qty: 30 TABLET | Refills: 3 | Status: SHIPPED | OUTPATIENT
Start: 2020-02-13 | End: 2020-05-05

## 2020-02-13 NOTE — PROGRESS NOTES
"  SUBJECTIVE                                                    CC: Patient presents with:  Addiction Problem    Jorge Madrigal is a 34 year old male who presents to clinic today for follow up of alcohol use and opioid cravings.     Date of last visit:  1/16/2020  No-shows/Late cancels: None    Primary Care Provider: [facility] Board of Pharmacy Data Base Reviewed:  Yes; reviewed today and no concerns for diversionary activity.  No data from the last year.     Brief History:   Initial visit with me on 1/16/2020  History of alcohol use disorder, opioid abuse, and major depression.    First alcohol use age 12 and problematic use \"for years\" but didn't realize until 2019.  He was drinking 1-2 pints of liquor or a bottle of wine nearly daily for a year.  Prior to that was binge drinking only on the weekends. Hx polysubstance abuse with concern currently for alcohol.  History of nasal/oral use of opioid pain pills.  First use age 24 buying them off the street - Oxycontin and hydrocodone in various amounts and would use them for 4 or 5 days at a time.  Hx opioid withdrawal but denies alcohol withdrawal symptoms.  Never been to CD tx or detox.  Never used IV.     Date of last use:   Alcohol - 1/7/2020    HPI:    2/13/2020  At his last visit he was started on naltrexone 25 mg daily for 1 week then increase to 50 mg daily as tolerated.  He is still taking sertraline 100 mg daily.  Opioid and alcohol cravings fully controlled with Naltrexone 50 mg daily.  He reports no thoughts or desire to drink, decreased binge eating, and no longer has cravings for opioids.    Mood has improved with sobriety and communicating more with his wife.  He reports this is the longest he has been sober \"in years.\"  Still attending regular individual therapy sessions and has been more active.     Labs reviewed from last visit with patient and answered all questions regarding labs results and follow up.  "     Patient status since last visit: stable     Cravings: denies     MAT Dose (naltrexone): adequate    Side Effects: none      Cues to use and relapse triggers: None    Recovery program has been: individual therapy     Sobriety:     Status: no use since last visit.      Drug Screen: obtained.     Tobacco use/desire to quit - no use   Hepatitis C status - never tested and never used IV    Social History     Social History Narrative    Moved to [place] in April 2019 with his wife from [place] for friends and loved [place].  Grew up in [place] with both parents who were alcoholics and used drugs.  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 Him and his wife have a 1 year old daughter and live together in [place].        Patient Active Problem List    Diagnosis Date Noted     Morbid obesity (H) 01/04/2020     Priority: Medium     Screening examination for pulmonary tuberculosis 06/07/2019     Priority: Medium     Moderate persistent asthma without complication      Priority: Medium     GERD (gastroesophageal reflux disease)      Priority: Medium     Recurrent major depressive disorder, in partial remission (H)      Priority: Medium       Problem list and histories reviewed & adjusted, as indicated.  Additional history: as documented    Current Medications:   albuterol (VENTOLIN HFA) 108 (90 Base) MCG/ACT inhaler, Inhale 2 puffs into the lungs every 4 hours as needed for shortness of breath / dyspnea or wheezing  cetirizine (ZYRTEC) 10 MG tablet, Take 10 mg by mouth daily  fluticasone (FLONASE) 50 MCG/ACT nasal spray, Spray 1 spray into both nostrils daily  fluticasone-salmeterol (ADVAIR) 500-50 MCG/DOSE inhaler, Inhale 1 puff into the lungs every 12 hours  ipratropium (ATROVENT) 0.03 % nasal spray, Spray 2 sprays into both nostrils 2 times daily  montelukast (SINGULAIR) 10 MG tablet, Take 1 tablet (10 mg) by mouth At Bedtime  naltrexone (DEPADE/REVIA) 50 MG tablet, Take 1/2 tablet daily  for 7 days, then increase to 1 tablet by mouth once daily.  OMEPRAZOLE PO, Take 20 mg by mouth  sertraline (ZOLOFT) 100 MG tablet, Take 1 tablet (100 mg) by mouth daily Due for office visit before further refills    No current facility-administered medications on file prior to visit.        No Known Allergies      REVIEW OF SYSTEMS:  General: Appears comfortable, and in no apparent distress.  No recent infections or fever  Eyes:  No vision concerns.  No double vision.    Resp: No coughing, wheezing or shortness of breath  CV: No chest pains or palpitations  GI: No nausea, vomiting, abdominal pain, diarrhea.  No constipation  : No urinary frequency or dysuria    Musculoskeletal: No significant muscle or joint pains other than as above.  No edema  Neurologic: No numbness, tingling, weakness, problems with balance or coordination  Psychiatric: No acute concerns other than as above.   Skin: No rashes or areas of acute infection    OBJECTIVE                                                      LABS:  Labs reviewed.  Urine tox results negative for all substances today.     Results for orders placed or performed in visit on 02/13/20   Urine Drugs of Abuse Screen Panel 13     Status: None   Result Value Ref Range    Cannabinoids (51-pvf-1-carboxy-9-THC) Not Detected NDET^Not Detected ng/mL    Phencyclidine (Phencyclidine) Not Detected NDET^Not Detected ng/mL    Cocaine (Benzoylecgonine) Not Detected NDET^Not Detected ng/mL    Methamphetamine (d-Methamphetamine) Not Detected NDET^Not Detected ng/mL    Opiates (Morphine) Not Detected NDET^Not Detected ng/mL    Amphetamine (d-Amphetamine) Not Detected NDET^Not Detected ng/mL    Benzodiazepines (Nordiazepam) Not Detected NDET^Not Detected ng/mL    Tricyclic Antidepressants (Desipramine) Not Detected NDET^Not Detected ng/mL    Methadone (Methadone) Not Detected NDET^Not Detected ng/mL    Barbiturates (Butalbital) Not Detected NDET^Not Detected ng/mL    Oxycodone (Oxycodone)  Not Detected NDET^Not Detected ng/mL    Propoxyphene (Norpropoxyphene) Not Detected NDET^Not Detected ng/mL    Buprenorphine (Buprenorphine) Not Detected NDET^Not Detected ng/mL       PHYSICAL EXAM:  /80   Pulse 69   Temp 97.6  F (36.4  C) (Oral)   Wt 128.4 kg (283 lb)   SpO2 98%   BMI 40.61 kg/m      GENERAL APPEARANCE:  alert, comfortable appearing  EYES:Eyes grossly normal to inspection  NEURO:  Gait normal.  No tremor. Coordination intact.     MENTAL STATUS EXAM:  Appearance:  awake, alert, well groomed, no apparent distress and moderately obese  Attitude:  cooperative  Eye Contact:  good  Mood:  better  Affect:  appropriate and in normal range and mood congruent  Speech:  clear, coherent and normal prosody rate /rhythm are normal  Psychomotor Behavior:  no evidence of tardive dyskinesia, dystonia, or tics  Throught Process:  logical, linear and goal oriented  Associations:  no loose associations  Thought Content:  no evidence of suicidal ideation or homicidal ideation, no evidence of psychotic thought, no auditory hallucinations present and no visual hallucinations present  Insight:  good  Judgement:  intact  Oriented to:  time, person, and place  Attention Span and Concentration:  intact  Recent and Remote Memory:  intact  Language fund of knowledge are adequate    ASSESSMENT/PLAN                                                      (F10.20) Alcohol use disorder, moderate, dependence (H)  (primary encounter diagnosis)  Comment: No use since 1/7/2020 and no cravings.   Plan: Continue taking naltrexone (DEPADE/REVIA) 50 MG tablet once daily. #30 with 3 refills.  Okay for future refills if remains stable as patient works full-time and has difficulty missing work.     (F33.1) Moderate episode of recurrent major depressive disorder (H)  Comment: Mood stable - improved with sobriety.   Plan: Sertraline 100 mg daily managed by PCP.   Continue individual therapy.     (F11.11) History of opioid abuse  (H)  Comment: No use since 4/2019 prior to moving to [place] from [place].  No longer having cravings for opioids.   Plan: Continue Nalrexone daily as above.       Follow-up: 3 months or sooner if needed       Patient counseling completed today:    Counseled the patient on the importance of having a recovery program in addition to MAT - even individual therapy and continuing to reach out to sober support network.  Also discussed not isolating, being open, honest with healthcare staff and loved ones, and staying active.  In addition, abstinence from alcohol, benzodiazepines, THC, opioids and other drugs of abuse was recommended.  Risk of overdose following a period of abstinence due to decrease tolerance was discussed including risk of death.          Aleena Bah CNP  [facility]  [facility]  [phone]

## 2020-02-14 ENCOUNTER — MYC MEDICAL ADVICE (OUTPATIENT)
Dept: FAMILY MEDICINE | Facility: CLINIC | Age: 35
End: 2020-02-14

## 2020-02-14 RX ORDER — ALBUTEROL SULFATE 90 UG/1
2 AEROSOL, METERED RESPIRATORY (INHALATION) EVERY 4 HOURS PRN
Qty: 8.5 G | Refills: 3 | Status: SHIPPED | OUTPATIENT
Start: 2020-02-14 | End: 2020-06-09

## 2020-02-14 RX ORDER — IPRATROPIUM BROMIDE 21 UG/1
2 SPRAY, METERED NASAL 2 TIMES DAILY
Qty: 30 ML | Refills: 0 | Status: SHIPPED | OUTPATIENT
Start: 2020-02-14 | End: 2020-03-18

## 2020-02-14 NOTE — TELEPHONE ENCOUNTER
"Routing refill request to provider for review/approval because:  Drug not on the [facility] refill protocol   ACT Total Scores 5/13/2019 8/7/2019   ACT TOTAL SCORE (Goal Greater than or Equal to 20) 14 20   In the past 12 months, how many times did you visit the emergency room for your asthma without being admitted to the hospital? 0 0   In the past 12 months, how many times were you hospitalized overnight because of your asthma? 0 0               Requested Prescriptions   Pending Prescriptions Disp Refills     albuterol (VENTOLIN HFA) 108 (90 Base) MCG/ACT inhaler 8.5 g 3     Sig: Inhale 2 puffs into the lungs every 4 hours as needed for shortness of breath / dyspnea or wheezing       Asthma Maintenance Inhalers - Anticholinergics Failed - 2/13/2020  5:49 PM        Failed - Asthma control assessment score within normal limits in last 6 months     Please review ACT score.           Passed - Patient is age 12 years or older        Passed - Medication is active on med list        Passed - Recent (6 mo) or future (30 days) visit within the authorizing provider's specialty     Patient had office visit in the last 6 months or has a visit in the next 30 days with authorizing provider or within the authorizing provider's specialty.  See \"Patient Info\" tab in inbasket, or \"Choose Columns\" in Meds & Orders section of the refill encounter.            ipratropium (ATROVENT) 0.03 % nasal spray 30 mL 0     Sig: Spray 2 sprays into both nostrils 2 times daily       There is no refill protocol information for this order          "

## 2020-02-14 NOTE — TELEPHONE ENCOUNTER
Patient requesting refill from Dr. Lao who most recently saw. See [employer] message dated 2/14/2020.     Routing to covering providers to advise.     Routing refill request to provider for review/approval because:  Drug not on the [facility] refill protocol   ACT score

## 2020-03-12 DIAGNOSIS — J45.20 MILD INTERMITTENT ASTHMA, UNSPECIFIED WHETHER COMPLICATED: ICD-10-CM

## 2020-03-12 NOTE — TELEPHONE ENCOUNTER
"Requested Prescriptions   Pending Prescriptions Disp Refills     montelukast (SINGULAIR) 10 MG tablet 90 tablet 0     Sig: Take 1 tablet (10 mg) by mouth At Bedtime   Last Written Prescription Date:  8/8/19  Last Fill Quantity: 90,  # refills: 0   Last office visit: 1/4/2020 with prescribing provider:     Future Office Visit:   Next 5 appointments (look out 90 days)    May 14, 2020  8:00 AM CDT  Return Visit with Aleena Bah CNP  [facility] ([facility]) [address] [place] [address]  [address]  [facility] [address]  [phone]             Leukotriene Inhibitors Protocol Failed - 3/12/2020  3:19 PM        Failed - Asthma control assessment score within normal limits in last 6 months     Please review ACT score.           Passed - Patient is age 12 or older     If patient is under 16, ok to refill using age based dosing.           Passed - Medication is active on med list        Passed - Recent (6 mo) or future (30 days) visit within the authorizing provider's specialty     Patient had office visit in the last 6 months or has a visit in the next 30 days with authorizing provider or within the authorizing provider's specialty.  See \"Patient Info\" tab in inbasket, or \"Choose Columns\" in Meds & Orders section of the refill encounter.                 "

## 2020-03-13 RX ORDER — MONTELUKAST SODIUM 10 MG/1
10 TABLET ORAL AT BEDTIME
Qty: 90 TABLET | Refills: 0 | Status: SHIPPED | OUTPATIENT
Start: 2020-03-13 | End: 2020-05-15

## 2020-03-13 NOTE — TELEPHONE ENCOUNTER
ACT Total Scores 5/13/2019 8/7/2019   ACT TOTAL SCORE (Goal Greater than or Equal to 20) 14 20   In the past 12 months, how many times did you visit the emergency room for your asthma without being admitted to the hospital? 0 0   In the past 12 months, how many times were you hospitalized overnight because of your asthma? 0 0       Routing refill request to provider for review/approval because:  Overdue for ACT.    Delphine Black RN,BSN  [facility]

## 2020-03-17 DIAGNOSIS — J45.20 MILD INTERMITTENT ASTHMA, UNSPECIFIED WHETHER COMPLICATED: ICD-10-CM

## 2020-03-17 NOTE — TELEPHONE ENCOUNTER
Requested Prescriptions   Pending Prescriptions Disp Refills     ipratropium (ATROVENT) 0.03 % nasal spray      Last Written Prescription Date:  2/14/2020  Last Fill Quantity: 30 mL,  # refills: 0   Last office visit: 1/4/2020 with prescribing provider:  Shilo Baxter Office Visit:   Next 5 appointments (look out 90 days)    May 14, 2020  8:00 AM CDT  Return Visit with Aleena Bah CNP  [facility] ([facility]) [address]  [address]  [facility] [address]  [phone]           Routing refill request to provider for review/approval because:  Drug not on the [facility], [facility] or [facility] refill protocol or controlled substance                        fluticasone (FLOVENT HFA) 220 MCG/ACT inhaler  DISCONTINUED  Last Written Prescription Date:  5/29/2019 END: 7/1/2019  Last Fill Quantity: 1 inhahler,  # refills: 3   Last office visit: 1/4/2020 with prescribing provider:  Shilo Baxter Office Visit:   Next 5 appointments (look out 90 days)    May 14, 2020  8:00 AM CDT  Return Visit with Aleena Bah CNP  [facility] ([facility]) [address]  [address]  [facility] [address]  [phone]          1 Inhaler 3     Sig: Inhale 1 puff into the lungs 2 times daily       Inhaled Steroids Protocol Failed - 3/17/2020  2:55 PM        Failed - Asthma control assessment score within normal limits in last 6 months     Please review ACT score.   ACT Total Scores 5/13/2019 8/7/2019   ACT TOTAL SCORE (Goal Greater than or Equal to 20) 14 20   In the past 12 months, how many times did you visit the emergency room for your asthma without being admitted to the hospital? 0 0   In the past 12 months, how many times were you hospitalized overnight because of your asthma? 0 0             Failed - Medication is active on med list        Passed - Patient is age 12 or older        Passed - Recent (6 mo) or future (30 days) visit  "within the authorizing provider's specialty     Patient had office visit in the last 6 months or has a visit in the next 30 days with authorizing provider or within the authorizing provider's specialty.  See \"Patient Info\" tab in inbasket, or \"Choose Columns\" in Meds & Orders section of the refill encounter.                  "

## 2020-03-18 RX ORDER — FLUTICASONE PROPIONATE 220 UG/1
1 AEROSOL, METERED RESPIRATORY (INHALATION) 2 TIMES DAILY
Qty: 1 INHALER | Refills: 3 | Status: SHIPPED | OUTPATIENT
Start: 2020-03-18 | End: 2020-07-14

## 2020-03-18 RX ORDER — IPRATROPIUM BROMIDE 21 UG/1
2 SPRAY, METERED NASAL 2 TIMES DAILY
Qty: 30 ML | Refills: 0 | Status: SHIPPED | OUTPATIENT
Start: 2020-03-18 | End: 2020-04-13

## 2020-04-12 DIAGNOSIS — J45.20 MILD INTERMITTENT ASTHMA, UNSPECIFIED WHETHER COMPLICATED: ICD-10-CM

## 2020-04-13 RX ORDER — IPRATROPIUM BROMIDE 21 UG/1
SPRAY, METERED NASAL
Qty: 30 ML | Refills: 1 | Status: SHIPPED | OUTPATIENT
Start: 2020-04-13 | End: 2021-01-22

## 2020-04-13 NOTE — TELEPHONE ENCOUNTER
Requested Prescriptions   Pending Prescriptions Disp Refills     ipratropium (ATROVENT) 0.03 % nasal spray [Pharmacy Med Name: IPRATROPIUM 0.03% JUAN DANIEL SP 30ML (345)] 30 mL 0     Sig: USE 2 SPRAYS IN EACH NOSTRIL TWICE DAILY     Last Written Prescription Date:  3/18/2020  Last Fill Quantity: 30mL,  # refills: 0   Last office visit: 1/4/2020 with prescribing provider:  Shilo  Future Office Visit:   Next 5 appointments (look out 90 days)    May 14, 2020  8:00 AM CDT  Return Visit with Aleena Bah CNP  [facility] ([facility]) [address] [facility] [address]  [address]  [place] [id number] [phone]                 Nasal Allergy Protocol Passed - 4/12/2020  1:16 PM        Passed - Patient is age 12 or older        Passed - Medication is active on med list

## 2020-05-05 ENCOUNTER — MYC MEDICAL ADVICE (OUTPATIENT)
Dept: ADDICTION MEDICINE | Facility: CLINIC | Age: 35
End: 2020-05-05

## 2020-05-05 DIAGNOSIS — F10.20 ALCOHOL USE DISORDER, MODERATE, DEPENDENCE (H): ICD-10-CM

## 2020-05-05 RX ORDER — NALTREXONE HYDROCHLORIDE 50 MG/1
TABLET, FILM COATED ORAL
Qty: 30 TABLET | Refills: 3 | Status: SHIPPED | OUTPATIENT
Start: 2020-05-05 | End: 2021-01-22

## 2020-05-05 NOTE — TELEPHONE ENCOUNTER
Will forward [facility] encounter to Aleena as FYI.      From: Jorge Madrigal      Created: 5/5/2020 9:13 AM        *-*-*This message has not been handled.*-*-*    Drake Flood,      I saw on my calendar that I have an appt scheduled with you on May 14, 2020 at 8AM. I remember we had discussed the option of messaging you instead of attending the appt, as long as all is well. I am continuing to take the naltrexone daily and it continues to be working very well. I continue to be alcohol free and have not had much of any desire to drink. Not going to lie, I have thought about having a drink or two, especially since the world has started to crumble, but I haven't acted on the thought and I call that a win.     At this point I am going to cancel my appt with you. Hope all is well with you and I'm sure I'll touch base with you sometime in the future.      Jorge Liang, RN  Care Coordinator   [facility]

## 2020-05-05 NOTE — TELEPHONE ENCOUNTER
Reply sent to patient via [employer] and refill on Naltrexone sent to pharmacy.    No further action needed.

## 2020-05-14 DIAGNOSIS — J45.20 MILD INTERMITTENT ASTHMA, UNSPECIFIED WHETHER COMPLICATED: ICD-10-CM

## 2020-05-14 NOTE — TELEPHONE ENCOUNTER
Patient care team-    Appointment needed for patient- Mood follow up, per Last PCP note recommendation    Please call patient 2 times in attempt to schedule an appointment for ASAP.    If appointment scheduled, please check with patient to see if they have enough medication to get them to appointment.  Please route back to triage with the above information.    If unable to get a hold of patient, please route to the provider.       Lois LAKHANI, RN, PHN

## 2020-05-15 ENCOUNTER — MYC MEDICAL ADVICE (OUTPATIENT)
Dept: FAMILY MEDICINE | Facility: CLINIC | Age: 35
End: 2020-05-15

## 2020-05-15 ENCOUNTER — VIRTUAL VISIT (OUTPATIENT)
Dept: FAMILY MEDICINE | Facility: CLINIC | Age: 35
End: 2020-05-15
Payer: COMMERCIAL

## 2020-05-15 DIAGNOSIS — J45.20 MILD INTERMITTENT ASTHMA, UNSPECIFIED WHETHER COMPLICATED: ICD-10-CM

## 2020-05-15 DIAGNOSIS — F33.41 RECURRENT MAJOR DEPRESSIVE DISORDER, IN PARTIAL REMISSION (H): ICD-10-CM

## 2020-05-15 DIAGNOSIS — F10.21 ALCOHOL USE DISORDER, MODERATE, IN EARLY REMISSION (H): ICD-10-CM

## 2020-05-15 DIAGNOSIS — J45.40 MODERATE PERSISTENT ASTHMA WITHOUT COMPLICATION: Primary | ICD-10-CM

## 2020-05-15 PROCEDURE — 99214 OFFICE O/P EST MOD 30 MIN: CPT | Mod: TEL | Performed by: FAMILY MEDICINE

## 2020-05-15 RX ORDER — SERTRALINE HYDROCHLORIDE 100 MG/1
100 TABLET, FILM COATED ORAL DAILY
Qty: 90 TABLET | Refills: 1 | Status: SHIPPED | OUTPATIENT
Start: 2020-05-15 | End: 2021-01-22

## 2020-05-15 RX ORDER — MONTELUKAST SODIUM 10 MG/1
10 TABLET ORAL AT BEDTIME
Qty: 90 TABLET | Refills: 0 | Status: SHIPPED | OUTPATIENT
Start: 2020-05-15 | End: 2020-10-06

## 2020-05-15 ASSESSMENT — PATIENT HEALTH QUESTIONNAIRE - PHQ9: SUM OF ALL RESPONSES TO PHQ QUESTIONS 1-9: 1

## 2020-05-15 NOTE — TELEPHONE ENCOUNTER
Appointment needed for patient (for depression/asthma).   Please call patient 2 times in attempt to schedule an appointment for ASAP.    If appointment scheduled, please check with patient to see if they have enough medication to get them to appointment.  Please route back to triage with the above information.    If unable to get a hold of patient, please route to the provider.

## 2020-05-15 NOTE — PROGRESS NOTES
"Jorge Madrigal is a 35 year old male who is being evaluated via a billable telephone visit.      The patient has been notified of following:     \"This telephone visit will be conducted via a call between you and your physician/provider. We have found that certain health care needs can be provided without the need for a physical exam.  This service lets us provide the care you need with a short phone conversation.  If a prescription is necessary we can send it directly to your pharmacy.  If lab work is needed we can place an order for that and you can then stop by our lab to have the test done at a later time.    Telephone visits are billed at different rates depending on your insurance coverage. During this emergency period, for some insurers they may be billed the same as an in-person visit.  Please reach out to your insurance provider with any questions.    If during the course of the call the physician/provider feels a telephone visit is not appropriate, you will not be charged for this service.\"    Patient has given verbal consent for Telephone visit?  Yes    What phone number would you like to be contacted at? [phone]    How would you like to obtain your AVS? Antonella Aggarwal     Jorge Madrigal is a 35 year old male who presents to clinic today for the following health issues:    HPI  Depression Followup    How are you doing with your depression since your last visit? No change    Are you having other symptoms that might be associated with depression? No    Have you had a significant life event?  No     Are you feeling anxious or having panic attacks?   No    Do you have any concerns with your use of alcohol or other drugs? No    Social History     Tobacco Use     Smoking status: Never Smoker     Smokeless tobacco: Never Used     Tobacco comment: Occassionally   Substance Use Topics     Alcohol use: Yes     Drug use: Never     PHQ 8/7/2019 1/4/2020 5/15/2020   PHQ-9 Total Score 9 11 1   Q9: " Thoughts of better off dead/self-harm past 2 weeks Not at all Not at all Not at all     EVERETTE-7 SCORE 1/4/2020   Total Score 5 (mild anxiety)   Total Score 5     Last PHQ-9 5/15/2020   1.  Little interest or pleasure in doing things 0   2.  Feeling down, depressed, or hopeless 0   3.  Trouble falling or staying asleep, or sleeping too much 0   4.  Feeling tired or having little energy 0   5.  Poor appetite or overeating 1   6.  Feeling bad about yourself 0   7.  Trouble concentrating 0   8.  Moving slowly or restless 0   Q9: Thoughts of better off dead/self-harm past 2 weeks 0   PHQ-9 Total Score 1   Difficulty at work, home, or with people Not difficult at all     In the past two weeks have you had thoughts of suicide or self-harm?  No.    Do you have concerns about your personal safety or the safety of others?   No    Suicide Assessment Five-step Evaluation and Treatment (SAFE-T)      How many servings of fruits and vegetables do you eat daily?  4 or more    On average, how many sweetened beverages do you drink each day (Examples: soda, juice, sweet tea, etc.  Do NOT count diet or artificially sweetened beverages)?   0    How many days per week do you exercise enough to make your heart beat faster? 4    How many minutes a day do you exercise enough to make your heart beat faster? 30 - 60    How many days per week do you miss taking your medication? 0    Doing well with his alcohol abuse, feels well controlled on the Naltrexone    Asthma Follow-Up    Was ACT completed today?  No      Do you have a cough?  No    Are you experiencing any wheezing in your chest?  No    Do you have any shortness of breath?  No     How often are you using a short-acting (rescue) inhaler or nebulizer, such as Albuterol?  A few times a week    How many days per week do you miss taking your asthma controller medication?  0    Please describe any recent triggers for your asthma: upper respiratory infections    Have you had any Emergency Room  Visits, Urgent Care Visits, or Hospital Admissions since your last office visit?  No      No Known Allergies    Reviewed and updated as needed this visit by Provider         Review of Systems   CONSTITUTIONAL: NEGATIVE for fever, chills, change in weight  ENT/MOUTH: NEGATIVE for ear, mouth and throat problems  RESP:NEGATIVE for significant cough or SOB and POSITIVE for Hx asthma  CV: NEGATIVE for chest pain, palpitations or peripheral edema  PSYCHIATRIC: POSITIVE foralcohol abuse and Hx depression       Objective   Reported vitals:  There were no vitals taken for this visit.   healthy, alert and no distress  PSYCH: Alert and oriented times 3; coherent speech, normal   rate and volume, able to articulate logical thoughts, able   to abstract reason, no tangential thoughts, no hallucinations   or delusions  His affect is normal and pleasant  RESP: No cough, no audible wheezing, able to talk in full sentences  Remainder of exam unable to be completed due to telephone visits    Diagnostic Test Results:  Labs reviewed in Epic  none         Assessment/Plan:  1. Moderate persistent asthma without complication  stable    2. Recurrent major depressive disorder, in partial remission (H)  stable    3. Mild intermittent asthma, unspecified whether complicated  Stable  - sertraline (ZOLOFT) 100 MG tablet; Take 1 tablet (100 mg) by mouth daily Due for office visit before further refills  Dispense: 90 tablet; Refill: 1  - montelukast (SINGULAIR) 10 MG tablet; Take 1 tablet (10 mg) by mouth At Bedtime  Dispense: 90 tablet; Refill: 0    4. Alcohol use disorder, moderate, in early remission (H)  In control      Return in about 3 months (around 8/15/2020) for Depression, asthma.      Phone call duration:  12 minutes    Phil Lao MD

## 2020-05-16 ASSESSMENT — ASTHMA QUESTIONNAIRES: ACT_TOTALSCORE: 21

## 2020-05-18 RX ORDER — SERTRALINE HYDROCHLORIDE 100 MG/1
TABLET, FILM COATED ORAL
Qty: 90 TABLET | Refills: 1 | Status: SHIPPED | OUTPATIENT
Start: 2020-05-18 | End: 2021-01-22

## 2020-05-18 RX ORDER — SERTRALINE HYDROCHLORIDE 100 MG/1
100 TABLET, FILM COATED ORAL DAILY
Qty: 90 TABLET | Refills: 1 | OUTPATIENT
Start: 2020-05-18

## 2020-06-08 DIAGNOSIS — J45.20 MILD INTERMITTENT ASTHMA, UNSPECIFIED WHETHER COMPLICATED: ICD-10-CM

## 2020-06-09 RX ORDER — ALBUTEROL SULFATE 90 UG/1
2 AEROSOL, METERED RESPIRATORY (INHALATION) EVERY 4 HOURS PRN
Qty: 8.5 G | Refills: 3 | Status: SHIPPED | OUTPATIENT
Start: 2020-06-09 | End: 2020-10-07

## 2020-07-13 DIAGNOSIS — J45.20 MILD INTERMITTENT ASTHMA, UNSPECIFIED WHETHER COMPLICATED: ICD-10-CM

## 2020-07-14 RX ORDER — FLUTICASONE PROPIONATE 220 UG/1
AEROSOL, METERED RESPIRATORY (INHALATION)
Qty: 12 G | Refills: 3 | Status: SHIPPED | OUTPATIENT
Start: 2020-07-14 | End: 2021-01-22

## 2020-09-05 DIAGNOSIS — F33.41 RECURRENT MAJOR DEPRESSIVE DISORDER, IN PARTIAL REMISSION (H): ICD-10-CM

## 2020-09-08 RX ORDER — SERTRALINE HYDROCHLORIDE 100 MG/1
TABLET, FILM COATED ORAL
Qty: 90 TABLET | Refills: 1 | OUTPATIENT
Start: 2020-09-08

## 2020-10-05 DIAGNOSIS — J45.20 MILD INTERMITTENT ASTHMA, UNSPECIFIED WHETHER COMPLICATED: ICD-10-CM

## 2020-10-06 DIAGNOSIS — J45.20 MILD INTERMITTENT ASTHMA, UNSPECIFIED WHETHER COMPLICATED: ICD-10-CM

## 2020-10-06 RX ORDER — MONTELUKAST SODIUM 10 MG/1
TABLET ORAL
Qty: 30 TABLET | Refills: 0 | Status: SHIPPED | OUTPATIENT
Start: 2020-10-06 | End: 2021-01-04

## 2020-10-07 RX ORDER — ALBUTEROL SULFATE 90 UG/1
AEROSOL, METERED RESPIRATORY (INHALATION)
Qty: 8.5 G | Refills: 1 | Status: SHIPPED | OUTPATIENT
Start: 2020-10-07 | End: 2021-01-22

## 2020-10-12 ENCOUNTER — MYC MEDICAL ADVICE (OUTPATIENT)
Dept: ADDICTION MEDICINE | Facility: CLINIC | Age: 35
End: 2020-10-12

## 2020-10-12 NOTE — LETTER
October 14, 2020      Jorge Madrigal  [address] [address]        To whom it may concern,      Jorge is no longer taking naltrexone tablets as of 9/29/2020 and has successfully remained abstinent from alcohol.  There is no indication for him to continue this medication at this time.            Sincerely,      Aleena Bah, DNP, APRN, CNP

## 2020-10-12 NOTE — TELEPHONE ENCOUNTER
Routing to provider - Olvin - please review and advise as appropriate    Patient Update:  See Bety    Reason:  Stopped medication naltrexone (DEPADE/REVIA) 50 MG tablet d/t patient trying to obtain life insurance for daughter

## 2020-10-14 ENCOUNTER — MYC MEDICAL ADVICE (OUTPATIENT)
Dept: ADDICTION MEDICINE | Facility: CLINIC | Age: 35
End: 2020-10-14

## 2020-10-14 NOTE — TELEPHONE ENCOUNTER
Response sent to patient via [employer].     Connie Caputo RN   [facility]

## 2020-10-20 ENCOUNTER — VIRTUAL VISIT (OUTPATIENT)
Dept: FAMILY MEDICINE | Facility: OTHER | Age: 35
End: 2020-10-20
Payer: COMMERCIAL

## 2020-10-20 PROCEDURE — 99421 OL DIG E/M SVC 5-10 MIN: CPT | Performed by: PHYSICIAN ASSISTANT

## 2020-10-20 NOTE — PROGRESS NOTES
"Date: 10/20/2020 15:11:23  Clinician: Jamari Dean  Clinician NPI: [id number]  Patient: Jorge Madrigal  Patient : 1985  Patient Address: [facility] [place] [address]  Patient Phone: [phone]  Visit Protocol: URI  Patient Summary:  Jorge is a 35 year old ( : 1985 ) male who initiated a [facility] Visit for COVID-19 (Coronavirus) evaluation and screening. When asked the question \"Please sign me up to receive news, health information and promotions from [facility].\", Joreg responded \"No\".    Jorge states his symptoms started today.   His symptoms consist of nausea, malaise, a sore throat, and diarrhea. He is experiencing mild difficulty breathing with activities but can speak normally in full sentences.   Symptom details   Sore throat: Jorge reports having mild throat pain (1-3 on a 10 point pain scale), does not have exudate on his tonsils, and can swallow liquids. The lymph nodes in his neck are not enlarged. A rash has not appeared on the skin since the sore throat started.    Jorge denies having ear pain, headache, wheezing, fever, enlarged lymph nodes, cough, nasal congestion, anosmia, vomiting, rhinitis, facial pain or pressure, myalgias, chills, teeth pain, and ageusia. He also denies taking antibiotic medication in the past month and having recent facial or sinus surgery in the past 60 days.   Precipitating events  Jorge is not sure if he has been exposed to someone with strep throat.   Pertinent COVID-19 (Coronavirus) information  In the past 14 days, Jorge has not worked in a congregate living setting.   He either works or volunteers as a healthcare worker or a , or works or volunteers in a healthcare facility. He provides direct patient care. 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 He does not " live with a healthcare worker.   Jorge has not had a close contact with a laboratory-confirmed COVID-19 patient within 14 days of symptom onset.   Since December 2019, Jorge and has not had upper respiratory infection or influenza-like illness. Has not been diagnosed with lab-confirmed COVID-19 test   Pertinent medical history  Jorge does not need a return to work/school note.   Weight: 260 lbs   Jorge does not smoke or use smokeless tobacco.   Additional information as reported by the patient (free text): My wife and daughter are both under the weather. My wife's is getting worse and is getting a Covid test today. I woke up with a sore throat and now as the day has progressed my chest is feeling tight and my energy is draining   Weight: 260 lbs    MEDICATIONS: sertraline oral, ipratropium bromide nasal, montelukast oral, albuterol sulfate inhalation, Flovent HFA inhalation, ALLERGIES: NKDA  Clinician Response:  Dear Jorge,   Your symptoms show that you may have coronavirus (COVID-19). This illness can cause fever, cough and trouble breathing. Many people get a mild case and get better on their own. Some people can get very sick.  What should I do?  We would like to test you for this virus.   1. Please call [phone] to schedule your visit. Explain that you were referred by [facility] to have a COVID-19 test. Be ready to share your [facility] number.  The following will serve as your written order for this COVID Test, ordered by me, for the indication of suspected COVID [Z20.828]: The test will be ordered in [employer], our electronic health record, after you are scheduled. It will show as ordered and authorized by Darian Burris MD.  Order: COVID-19 (Coronavirus) PCR for SYMPTOMATIC testing from [facility].      2. When it's time for your COVID test:  Stay at least 6 feet away from others. (If someone will drive you to your test, stay in the backseat, as far away from the  as you can.)   Cover your mouth and nose with a  "mask, tissue or washcloth.  Go straight to the testing site. Don't make any stops on the way there or back.      3.Starting now: Stay home and away from others (self-isolate) until:   You've had no fever---and no medicine that reduces fever---for one full day (24 hours). And...   Your other symptoms have gotten better. For example, your cough or breathing has improved. And...   At least 10 days have passed since your symptoms started.       During this time, don't leave the house except for testing or medical care.   Stay in your own room, even for meals. Use your own bathroom if you can.   Stay away from others in your home. No hugging, kissing or shaking hands. No visitors.  Don't go to work, school or anywhere else.    Clean \"high touch\" surfaces often (doorknobs, counters, handles, etc.). Use a household cleaning spray or wipes. You'll find a full list of  on the EPA website: www.epa.gov/pesticide-registration/list-n-disinfectants-use-against-sars-cov-2.   Cover your mouth and nose with a mask, tissue or washcloth to avoid spreading germs.  Wash your hands and face often. Use soap and water.  Caregivers in these groups are at risk for severe illness due to COVID-19:  o People 65 years and older  o People who live in a nursing home or long-term care facility  o People with chronic disease (lung, heart, cancer, diabetes, kidney, liver, immunologic)  o People who have a weakened immune system, including those who:   Are in cancer treatment  Take medicine that weakens the immune system, such as corticosteroids  Had a bone marrow or organ transplant  Have an immune deficiency  Have poorly controlled HIV or AIDS  Are obese (body mass index of 40 or higher)  Smoke regularly   o Caregivers should wear gloves while washing dishes, handling laundry and cleaning bedrooms and bathrooms.  o Use caution when washing and drying laundry: Don't shake dirty laundry, and use the warmest water setting that you can.  o For " more tips, go to www.cdc.gov/coronavirus/2019-ncov/downloads/10Things.pdf.    How can I take care of myself?    Get lots of rest. Drink extra fluids (unless a doctor has told you not to).   Take Tylenol (acetaminophen) for fever or pain. If you have liver or kidney problems, ask your family doctor if it's okay to take Tylenol.   Adults can take either:    650 mg (two 325 mg pills) every 4 to 6 hours, or...   1,000 mg (two 500 mg pills) every 8 hours as needed.    Note: Don't take more than 3,000 mg in one day. Acetaminophen is found in many medicines (both prescribed and over-the-counter medicines). Read all labels to be sure you don't take too much.   For children, check the Tylenol bottle for the right dose. The dose is based on the child's age or weight.    If you have other health problems (like cancer, heart failure, an organ transplant or severe kidney disease): Call your specialty clinic if you don't feel better in the next 2 days.       Know when to call 911. Emergency warning signs include:    Trouble breathing or shortness of breath Pain or pressure in the chest that doesn't go away Feeling confused like you haven't felt before, or not being able to wake up Bluish-colored lips or face.  Where can I get more information?   [facility] -- About COVID-19: [email or web address]   CDC -- What to Do If You're Sick: www.cdc.gov/coronavirus/2019-ncov/about/steps-when-sick.html   CDC -- Ending Home Isolation: www.cdc.gov/coronavirus/2019-ncov/hcp/disposition-in-home-patients.html   CDC -- Caring for Someone: www.cdc.gov/coronavirus/2019-ncov/if-you-are-sick/care-for-someone.html   [facility] -- Interim Guidance for Hospital Discharge to Home: www.health.[facility].[place]/diseases/coronavirus/hcp/hospdischarge.pdf   [facility] clinical trials (COVID-19 research studies): clinicalaffairs.[facility]/n-clinical-trials    Below are the COVID-19 hotlines at the [place] Department of Health ([facility]).  Interpreters are available.    For health questions: Call 796-176-4484 or 1-259.998.8532 (7 a.m. to 7 p.m.) For questions about schools and childcare: Call 574-146-6311 or 1-579.681.3707 (7 a.m. to 7 p.m.)    Diagnosis: Contact with and (suspected) exposure to other viral communicable diseases  Diagnosis ICD: Z20.828

## 2020-10-21 ENCOUNTER — MYC MEDICAL ADVICE (OUTPATIENT)
Dept: FAMILY MEDICINE | Facility: CLINIC | Age: 35
End: 2020-10-21

## 2020-10-21 DIAGNOSIS — Z20.822 SUSPECTED 2019 NOVEL CORONAVIRUS INFECTION: Primary | ICD-10-CM

## 2020-10-21 DIAGNOSIS — Z20.822 EXPOSURE TO COVID-19 VIRUS: ICD-10-CM

## 2020-10-21 DIAGNOSIS — J45.40 MODERATE PERSISTENT ASTHMA WITHOUT COMPLICATION: Primary | ICD-10-CM

## 2020-10-21 PROCEDURE — U0003 INFECTIOUS AGENT DETECTION BY NUCLEIC ACID (DNA OR RNA); SEVERE ACUTE RESPIRATORY SYNDROME CORONAVIRUS 2 (SARS-COV-2) (CORONAVIRUS DISEASE [COVID-19]), AMPLIFIED PROBE TECHNIQUE, MAKING USE OF HIGH THROUGHPUT TECHNOLOGIES AS DESCRIBED BY CMS-2020-01-R: HCPCS | Mod: 90 | Performed by: PATHOLOGY

## 2020-10-21 PROCEDURE — 99000 SPECIMEN HANDLING OFFICE-LAB: CPT | Performed by: PATHOLOGY

## 2020-10-22 LAB
SARS-COV-2 RNA SPEC QL NAA+PROBE: NOT DETECTED
SPECIMEN SOURCE: NORMAL

## 2020-10-22 RX ORDER — PREDNISONE 20 MG/1
TABLET ORAL
Qty: 14 TABLET | Refills: 0 | Status: SHIPPED | OUTPATIENT
Start: 2020-10-22 | End: 2021-01-22

## 2020-10-29 ENCOUNTER — MYC MEDICAL ADVICE (OUTPATIENT)
Dept: FAMILY MEDICINE | Facility: CLINIC | Age: 35
End: 2020-10-29

## 2020-10-29 NOTE — LETTER
November 2, 2020      Jorge Madrigal  [address] [facility] [address]        To Whom It May Concern,     Pt was exposed to COVID-19 on 10/19.  He had symptoms that developed on 10/20 but he himself tested negative.  His symptoms resolved by 10/22/20.  He should be safe and able to return to work as of Monday 11/02/20      Sincerely,        Phil Lao MD

## 2020-11-03 NOTE — TELEPHONE ENCOUNTER
Patient Contact    Called patient. He states he spoke more with his letter and they stated he didn't need one.     He requests we send it just in case. Letter emailed to [email or web address].

## 2020-11-08 ENCOUNTER — MYC MEDICAL ADVICE (OUTPATIENT)
Dept: FAMILY MEDICINE | Facility: CLINIC | Age: 35
End: 2020-11-08

## 2020-11-09 ENCOUNTER — NURSE TRIAGE (OUTPATIENT)
Dept: NURSING | Facility: CLINIC | Age: 35
End: 2020-11-09

## 2020-11-09 NOTE — TELEPHONE ENCOUNTER
RN Triage:    Spoke with 35 yr old Jorge who reports:    Messaged PCP yesterday but has not heard back.    3 weeks ago wife tested positive for COVID-19.    Pt tested negative on 10/23/20 but quarantined for 2 weeks.    Pt has gone back to work.    Pt doesn't have current symptoms, but his 22 mo old daughter developed cough and other URI symptoms 1-2 days ago.      Pt is concerned about being at work and if he should be tested again.    PLAN:  Offered [facility] for potential eval and testing or to check with [facility] website for other locations that just offer the COVID-19 test.  Pt voiced understanding.        Rose Yang RN  [facility]        Reason for Disposition    [1] COVID-19 EXPOSURE (Close Contact) within last 14 days AND [2] needs COVID-19 lab test to return to work AND [3] NO symptoms    Additional Information    Negative: COVID-19 has been diagnosed by a healthcare provider (HCP)    Negative: COVID-19 lab test positive    Negative: [1] Symptoms of COVID-19 (e.g., cough, fever, SOB, or others) AND [2] lives in an area with community spread    Negative: [1] Symptoms of COVID-19 (e.g., cough, fever, SOB, or others) AND [2] within 14 days of EXPOSURE (close contact) with diagnosed or suspected COVID-19 patient    Negative: [1] Symptoms of COVID-19 (e.g., cough, fever, SOB, or others) AND [2] within 14 days of travel from high-risk area for COVID-19 community spread (identified by CDC)    Negative: [1] Difficulty breathing (shortness of breath) occurs AND [2] onset > 14 days after COVID-19 EXPOSURE (Close Contact) AND [3] no community spread where patient lives    Negative: [1] Dry cough occurs AND [2] onset > 14 days after COVID-19 EXPOSURE AND [3] no community spread where patient lives    Negative: [1] Wet cough (i.e., white-yellow, yellow, green, or shruthi colored sputum) AND [2] onset > 14 days after COVID-19 EXPOSURE AND [3] no community spread where patient lives    Negative: [1] Common cold  symptoms AND [2] onset > 14 days after COVID-19 EXPOSURE AND [3] no community spread where patient lives    Protocols used: CORONAVIRUS (COVID-19) EXPOSURE-A- 8.4.20

## 2020-11-10 DIAGNOSIS — Z20.822 EXPOSURE TO COVID-19 VIRUS: ICD-10-CM

## 2020-11-10 PROCEDURE — 86769 SARS-COV-2 COVID-19 ANTIBODY: CPT | Performed by: PHYSICIAN ASSISTANT

## 2020-11-10 PROCEDURE — 36415 COLL VENOUS BLD VENIPUNCTURE: CPT | Performed by: PHYSICIAN ASSISTANT

## 2020-11-10 NOTE — TELEPHONE ENCOUNTER
I would recommend seeing the pediatrician says.  If he has no fever or new symptoms I don't think he has to quarantine.

## 2020-11-12 LAB
COVID-19 SPIKE RBD ABY TITER: NORMAL
COVID-19 SPIKE RBD ABY: NEGATIVE

## 2021-01-03 ENCOUNTER — HEALTH MAINTENANCE LETTER (OUTPATIENT)
Age: 36
End: 2021-01-03

## 2021-01-22 ENCOUNTER — VIRTUAL VISIT (OUTPATIENT)
Dept: INTERNAL MEDICINE | Facility: CLINIC | Age: 36
End: 2021-01-22
Payer: COMMERCIAL

## 2021-01-22 VITALS — WEIGHT: 250 LBS | BODY MASS INDEX: 35.87 KG/M2

## 2021-01-22 DIAGNOSIS — K21.9 GASTROESOPHAGEAL REFLUX DISEASE WITHOUT ESOPHAGITIS: ICD-10-CM

## 2021-01-22 DIAGNOSIS — Z13.1 SCREENING FOR DIABETES MELLITUS: ICD-10-CM

## 2021-01-22 DIAGNOSIS — Z13.220 LIPID SCREENING: ICD-10-CM

## 2021-01-22 DIAGNOSIS — F33.41 RECURRENT MAJOR DEPRESSIVE DISORDER, IN PARTIAL REMISSION (H): ICD-10-CM

## 2021-01-22 DIAGNOSIS — J45.20 MILD INTERMITTENT ASTHMA, UNSPECIFIED WHETHER COMPLICATED: ICD-10-CM

## 2021-01-22 DIAGNOSIS — Z76.89 ENCOUNTER TO ESTABLISH CARE: Primary | ICD-10-CM

## 2021-01-22 PROCEDURE — 99214 OFFICE O/P EST MOD 30 MIN: CPT | Mod: GT | Performed by: PHYSICIAN ASSISTANT

## 2021-01-22 RX ORDER — FLUTICASONE PROPIONATE 220 UG/1
AEROSOL, METERED RESPIRATORY (INHALATION)
Qty: 12 G | Refills: 11 | Status: SHIPPED | OUTPATIENT
Start: 2021-01-22 | End: 2021-11-29

## 2021-01-22 RX ORDER — MONTELUKAST SODIUM 10 MG/1
10 TABLET ORAL AT BEDTIME
Qty: 90 TABLET | Refills: 3 | Status: SHIPPED | OUTPATIENT
Start: 2021-01-22 | End: 2021-02-03

## 2021-01-22 RX ORDER — SERTRALINE HYDROCHLORIDE 100 MG/1
100 TABLET, FILM COATED ORAL DAILY
Qty: 90 TABLET | Refills: 3 | Status: SHIPPED | OUTPATIENT
Start: 2021-01-22 | End: 2021-11-29

## 2021-01-22 RX ORDER — ALBUTEROL SULFATE 90 UG/1
2 AEROSOL, METERED RESPIRATORY (INHALATION) EVERY 4 HOURS PRN
Qty: 8.5 G | Refills: 4 | Status: SHIPPED | OUTPATIENT
Start: 2021-01-22 | End: 2021-07-16

## 2021-01-22 RX ORDER — FAMOTIDINE 10 MG
10-20 TABLET ORAL 2 TIMES DAILY
Qty: 180 TABLET | Refills: 3 | Status: SHIPPED | OUTPATIENT
Start: 2021-01-22 | End: 2021-11-22

## 2021-01-22 ASSESSMENT — PATIENT HEALTH QUESTIONNAIRE - PHQ9: SUM OF ALL RESPONSES TO PHQ QUESTIONS 1-9: 4

## 2021-01-22 NOTE — PROGRESS NOTES
Jorge is a 35 year old who is being evaluated via a billable video visit.      How would you like to obtain your AVS? MyChart  If the video visit is dropped, the invitation should be resent by: Text to cell phone: [phone]  Will anyone else be joining your video visit? No         Video Start Time: 11:15  Assessment & Plan     Encounter to establish care  - reviewed past medical history and health maintenance     Recurrent major depressive disorder, in partial remission (H)  - stable, continue current treatment   - sertraline (ZOLOFT) 100 MG tablet; Take 1 tablet (100 mg) by mouth daily    Mild intermittent asthma, unspecified whether complicated  - stable, continue current treatment  - montelukast (SINGULAIR) 10 MG tablet; Take 1 tablet (10 mg) by mouth At Bedtime  - albuterol (PROAIR HFA/PROVENTIL HFA/VENTOLIN HFA) 108 (90 Base) MCG/ACT inhaler; Inhale 2 puffs into the lungs every 4 hours as needed for shortness of breath / dyspnea or wheezing  - fluticasone (FLOVENT HFA) 220 MCG/ACT inhaler; INHALE 1 PUFF INTO THE LUNGS TWICE DAILY    Gastroesophageal reflux disease without esophagitis  - trial of pepcid, if no relief can switch back to omeprazole   - famotidine (PEPCID) 10 MG tablet; Take 1-2 tablets (10-20 mg) by mouth 2 times daily    Screening for diabetes mellitus  - **Glucose FUTURE anytime; Future    Lipid screening  - Lipid panel reflex to direct LDL Fasting; Future    No follow-ups on file.    Kerri Tesfaye PA-C  [facility]     Jorge is a 35 year old who presents to clinic today for the following health issues   HPI       New Patient/Transfer of Care    Past medical history:  Asthma: currently well controlled on flovent, albuterol and singulair   Depressive disorder: on sertraline, well controlled   Acid reflux: on omeprazole has tried to wean off without success   History of alcohol abuse: in remission with no concerns     Health  Maintenance:  Colonoscopy: start at age 40  STD screening: declined    Immunizations: could consider pneumonia shot   Cholesterol screening: DUE   Diabetes screening: DUE             Objective           Vitals:  No vitals were obtained today due to virtual visit.    Physical Exam   GENERAL: Healthy, alert and no distress  EYES: Eyes grossly normal to inspection.  No discharge or erythema, or obvious scleral/conjunctival abnormalities.  RESP: No audible wheeze, cough, or visible cyanosis.  No visible retractions or increased work of breathing.    SKIN: Visible skin clear. No significant rash, abnormal pigmentation or lesions.  PSYCH: Mentation appears normal, affect normal/bright, judgement and insight intact, normal speech and appearance well-groomed.            Video-Visit Details    Type of service:  Video Visit    Video End Time:11:37    Originating Location (pt. Location): work     Distant Location (provider location): Home    Platform used for Video Visit: [employer]

## 2021-01-23 ASSESSMENT — ASTHMA QUESTIONNAIRES: ACT_TOTALSCORE: 22

## 2021-01-26 ENCOUNTER — IMMUNIZATION (OUTPATIENT)
Dept: NURSING | Facility: CLINIC | Age: 36
End: 2021-01-26
Payer: COMMERCIAL

## 2021-01-26 PROCEDURE — 0001A PR COVID VAC PFIZER DIL RECON 30 MCG/0.3 ML IM: CPT

## 2021-01-26 PROCEDURE — 91300 PR COVID VAC PFIZER DIL RECON 30 MCG/0.3 ML IM: CPT

## 2021-02-16 ENCOUNTER — IMMUNIZATION (OUTPATIENT)
Dept: NURSING | Facility: CLINIC | Age: 36
End: 2021-02-16
Attending: INTERNAL MEDICINE
Payer: COMMERCIAL

## 2021-02-16 PROCEDURE — 91300 PR COVID VAC PFIZER DIL RECON 30 MCG/0.3 ML IM: CPT

## 2021-02-16 PROCEDURE — 0002A PR COVID VAC PFIZER DIL RECON 30 MCG/0.3 ML IM: CPT

## 2021-05-26 ENCOUNTER — VIRTUAL VISIT (OUTPATIENT)
Dept: URGENT CARE | Facility: CLINIC | Age: 36
End: 2021-05-26
Payer: COMMERCIAL

## 2021-05-26 DIAGNOSIS — J98.8 VIRAL RESPIRATORY ILLNESS: Primary | ICD-10-CM

## 2021-05-26 DIAGNOSIS — J45.31 MILD PERSISTENT ASTHMA WITH ACUTE EXACERBATION: ICD-10-CM

## 2021-05-26 DIAGNOSIS — B97.89 VIRAL RESPIRATORY ILLNESS: Primary | ICD-10-CM

## 2021-05-26 PROCEDURE — 99214 OFFICE O/P EST MOD 30 MIN: CPT | Mod: GT

## 2021-05-26 RX ORDER — PREDNISONE 20 MG/1
40 TABLET ORAL DAILY
Qty: 10 TABLET | Refills: 0 | Status: SHIPPED | OUTPATIENT
Start: 2021-05-26 | End: 2021-05-31

## 2021-05-26 RX ORDER — CODEINE PHOSPHATE AND GUAIFENESIN 10; 100 MG/5ML; MG/5ML
1-2 SOLUTION ORAL EVERY 4 HOURS PRN
Qty: 60 ML | Refills: 0 | Status: SHIPPED | OUTPATIENT
Start: 2021-05-26 | End: 2021-11-11

## 2021-05-26 NOTE — PATIENT INSTRUCTIONS
Continue all regular allergy/asthma meds, and albuterol nebulizer/inhaler as needed.  Use cough syrup with codeine only at night, use sparingly and do not combine with alcohol.  Go to ER for significant shortness of breath or chest pain.

## 2021-07-16 DIAGNOSIS — J45.20 MILD INTERMITTENT ASTHMA, UNSPECIFIED WHETHER COMPLICATED: ICD-10-CM

## 2021-07-16 RX ORDER — ALBUTEROL SULFATE 90 UG/1
2 AEROSOL, METERED RESPIRATORY (INHALATION) EVERY 4 HOURS PRN
Qty: 8.5 G | Refills: 4 | Status: SHIPPED | OUTPATIENT
Start: 2021-07-16 | End: 2022-01-27

## 2021-07-16 NOTE — TELEPHONE ENCOUNTER
Routing refill request to provider for review/approval because:  Patient has  been seen recently but there provider is no longer here-   Luci Moreno RN  [facility]

## 2021-10-10 ENCOUNTER — HEALTH MAINTENANCE LETTER (OUTPATIENT)
Age: 36
End: 2021-10-10

## 2021-11-11 ENCOUNTER — VIRTUAL VISIT (OUTPATIENT)
Dept: INTERNAL MEDICINE | Facility: CLINIC | Age: 36
End: 2021-11-11
Payer: COMMERCIAL

## 2021-11-11 DIAGNOSIS — J45.20 MILD INTERMITTENT ASTHMA, UNSPECIFIED WHETHER COMPLICATED: ICD-10-CM

## 2021-11-11 PROCEDURE — 96127 BRIEF EMOTIONAL/BEHAV ASSMT: CPT | Performed by: NURSE PRACTITIONER

## 2021-11-11 PROCEDURE — 99213 OFFICE O/P EST LOW 20 MIN: CPT | Mod: GT | Performed by: NURSE PRACTITIONER

## 2021-11-11 RX ORDER — CYCLOBENZAPRINE HCL 10 MG
TABLET ORAL
COMMUNITY
Start: 2020-01-15 | End: 2021-11-29

## 2021-11-11 RX ORDER — MONTELUKAST SODIUM 10 MG/1
10 TABLET ORAL AT BEDTIME
Qty: 90 TABLET | Refills: 1 | Status: SHIPPED | OUTPATIENT
Start: 2021-11-11 | End: 2022-06-23

## 2021-11-11 RX ORDER — IBUPROFEN 200 MG
200-400 TABLET ORAL
COMMUNITY
End: 2023-02-16

## 2021-11-11 RX ORDER — ACETAMINOPHEN 325 MG/1
325-650 TABLET ORAL
COMMUNITY

## 2021-11-11 RX ORDER — PREDNISONE 20 MG/1
40 TABLET ORAL DAILY
Qty: 10 TABLET | Refills: 0 | Status: SHIPPED | OUTPATIENT
Start: 2021-11-11 | End: 2021-11-16

## 2021-11-11 RX ORDER — ALBUTEROL SULFATE 0.63 MG/3ML
1 SOLUTION RESPIRATORY (INHALATION) EVERY 4 HOURS PRN
Qty: 3 ML | Refills: 3 | Status: SHIPPED | OUTPATIENT
Start: 2021-11-11 | End: 2023-03-09

## 2021-11-11 ASSESSMENT — ANXIETY QUESTIONNAIRES
6. BECOMING EASILY ANNOYED OR IRRITABLE: NOT AT ALL
3. WORRYING TOO MUCH ABOUT DIFFERENT THINGS: NOT AT ALL
1. FEELING NERVOUS, ANXIOUS, OR ON EDGE: NOT AT ALL
7. FEELING AFRAID AS IF SOMETHING AWFUL MIGHT HAPPEN: NOT AT ALL
GAD7 TOTAL SCORE: 0
5. BEING SO RESTLESS THAT IT IS HARD TO SIT STILL: NOT AT ALL
2. NOT BEING ABLE TO STOP OR CONTROL WORRYING: NOT AT ALL
IF YOU CHECKED OFF ANY PROBLEMS ON THIS QUESTIONNAIRE, HOW DIFFICULT HAVE THESE PROBLEMS MADE IT FOR YOU TO DO YOUR WORK, TAKE CARE OF THINGS AT HOME, OR GET ALONG WITH OTHER PEOPLE: NOT DIFFICULT AT ALL

## 2021-11-11 ASSESSMENT — PATIENT HEALTH QUESTIONNAIRE - PHQ9: 5. POOR APPETITE OR OVEREATING: NOT AT ALL

## 2021-11-11 NOTE — PROGRESS NOTES
Jorge is a 36 year old who is being evaluated via a billable video visit.      How would you like to obtain your AVS? MyChart  If the video visit is dropped, the invitation should be resent by: Text to cell phone:  [phone]  Will anyone else be joining your video visit? No  {If patient encounters technical issues they should call [phone] :     Video Start Time: 17:45    Assessment & Plan     Mild intermittent asthma, unspecified whether complicated  - Currently reports increased symptoms- SOB upon waking and at bedtime, cough; no wheezing; no f/c.  No CP.  Had COVID test yesterday which was negative.  ACT Score is 14  - Refilled albuterol neb solution  - Continue Flovent BID  - Continue Singulair- refilled  - Continue Zyrtec  - Denies needing refills on anything else  - Offered prednisone, but does not feel he needs this at this time, though requests to have on hand for future use if needed- will provide 1x use if necessary  - On virtual exam, noted to be talking in full sentences; no coughing/wheezing appreciated; appeared to be moving around his home without difficulty  - Counseled on return symptoms  - Referral to Pharm D for Asthma eval and to see if there are other inhalers that may be covered by his insurance  - montelukast (SINGULAIR) 10 MG tablet  Dispense: 90 tablet; Refill: 1  - albuterol (ACCUNEB) 0.63 MG/3ML neb solution  Dispense: 3 mL; Refill: 3      See Patient Instructions    Return if symptoms worsen or fail to improve, for Follow up as scheduled 11/29.    FLAKITO [facility]   Jorge is a 36 year old who presents for the following health issues    HPI     Depression Followup    How are you doing with your depression since your last visit? Improved     Are you having other symptoms that might be associated with depression? No    Have you had a significant life event?  OTHER: Wife is pregnant     Are you feeling anxious or  having panic attacks?   No    Do you have any concerns with your use of alcohol or other drugs? No    Social History     Tobacco Use     Smoking status: Never Smoker     Smokeless tobacco: Never Used     Tobacco comment: Occassionally   Substance Use Topics     Alcohol use: Yes     Drug use: Never     PHQ 1/4/2020 5/15/2020 1/22/2021   PHQ-9 Total Score 11 1 4   Q9: Thoughts of better off dead/self-harm past 2 weeks Not at all Not at all Not at all     EVERETTE-7 SCORE 1/4/2020   Total Score 5 (mild anxiety)   Total Score 5     Last PHQ-9 11/11/2021   1.  Little interest or pleasure in doing things 0   2.  Feeling down, depressed, or hopeless 0   3.  Trouble falling or staying asleep, or sleeping too much 0   4.  Feeling tired or having little energy 0   5.  Poor appetite or overeating 0   6.  Feeling bad about yourself 0   7.  Trouble concentrating 0   8.  Moving slowly or restless 0   Q9: Thoughts of better off dead/self-harm past 2 weeks 0   PHQ-9 Total Score 0   Difficulty at work, home, or with people Not difficult at all     EVERETTE-7  11/11/2021   1. Feeling nervous, anxious, or on edge 0   2. Not being able to stop or control worrying 0   3. Worrying too much about different things 0   4. Trouble relaxing 0   5. Being so restless that it is hard to sit still 0   6. Becoming easily annoyed or irritable 0   7. Feeling afraid, as if something awful might happen 0   EVERETTE-7 Total Score 0   If you checked any problems, how difficult have they made it for you to do your work, take care of things at home, or get along with other people? Not difficult at all       Suicide Assessment Five-step Evaluation and Treatment (SAFE-T)    Asthma Follow-Up    Was ACT completed today?    Yes    ACT Total Scores 11/11/2021   ACT TOTAL SCORE (Goal Greater than or Equal to 20) 14   In the past 12 months, how many times did you visit the emergency room for your asthma without being admitted to the hospital? 0   In the past 12 months, how many  times were you hospitalized overnight because of your asthma? 0       How many days per week do you miss taking your asthma controller medication?  0    Please describe any recent triggers for your asthma: smoke, pollens, animal dander and strong odors and fumes    Have you had any Emergency Room Visits, Urgent Care Visits, or Hospital Admissions since your last office visit?  Justyna Patel is seen via virtual visit for refill of albuterol neb solution. Reports hx of asthma since ~ age 12.  Usually only needs to use neb solution ~ 2x/year when he's sick.  Feels like he has an acute illness.  Reports dry cough mostly, but sometimes is productive.  No f/c. No wheezing. Hoarse voice.  Gets SOB when he wakens in the morning and in the evening; once he gets up for the day he feels better. He reports that his wife's work place had a + COVID exposure; she was tested and was negative.  Patient state he had a COVID test yesterday which was negative. Generally uses his albuterol inhaler 3-4x/week; uses Flovent BID; Zyrtec daily; Singulair daily.  Has been using an  Rx of albuterol nebs ( 2019).  States his asthma was best managed when he was on Advair, however, has had difficulty obtaining Advair due to insurance issues.  Of note, denies heartburn symptoms.     Review of Systems   CONSTITUTIONAL:NEGATIVE for fever, chills, change in weight  EYES: NEGATIVE for vision changes or irritation  ENT/MOUTH: Hoarse voice  RESP:POSITIVE for SOB upon waking and at bedtime. Denies wheezing; dry cough  CV: NEGATIVE for chest pain, palpitations or peripheral edema  MUSCULOSKELETAL: NEGATIVE for significant arthralgias or myalgia      Objective           Vitals:  No vitals were obtained today due to virtual visit.    Physical Exam   GENERAL: Healthy, alert and no distress  EYES: Eyes grossly normal to inspection.  No discharge or erythema, or obvious scleral/conjunctival abnormalities.  RESP: No audible wheeze, cough, or visible  cyanosis.  No visible retractions or increased work of breathing.    SKIN: Visible skin clear. No significant rash, abnormal pigmentation or lesions.  NEURO: Cranial nerves grossly intact.  Mentation and speech appropriate for age.  PSYCH: Mentation appears normal, affect normal/bright, judgement and insight intact, normal speech and appearance well-groomed.         Video-Visit Details    Type of service:  Video Visit    Video End Time:6:03 PM    Originating Location (pt. Location): Home    Distant Location (provider location):  [facility]     Platform used for Video Visit: [email or web address]

## 2021-11-11 NOTE — PATIENT INSTRUCTIONS
-Jorge, Your albuterol solution was refilled to your pharmacy  -I've referred you to our Clinic Pharmacist to review your medications and help determine if your insurance will cover other medications  -I sent a one-time Rx to your pharmacy for prednisone to have on hand if you need it   -If your symptoms worsen, please let me know  -Follow up with Dr. Carvajal as scheduled 11/29

## 2021-11-12 ASSESSMENT — ANXIETY QUESTIONNAIRES: GAD7 TOTAL SCORE: 0

## 2021-11-12 ASSESSMENT — PATIENT HEALTH QUESTIONNAIRE - PHQ9: SUM OF ALL RESPONSES TO PHQ QUESTIONS 1-9: 0

## 2021-11-12 ASSESSMENT — ASTHMA QUESTIONNAIRES: ACT_TOTALSCORE: 14

## 2021-11-22 ENCOUNTER — VIRTUAL VISIT (OUTPATIENT)
Dept: PHARMACY | Facility: CLINIC | Age: 36
End: 2021-11-22
Attending: NURSE PRACTITIONER
Payer: COMMERCIAL

## 2021-11-22 DIAGNOSIS — Z71.85 VACCINE COUNSELING: ICD-10-CM

## 2021-11-22 DIAGNOSIS — K21.9 GASTROESOPHAGEAL REFLUX DISEASE WITHOUT ESOPHAGITIS: ICD-10-CM

## 2021-11-22 DIAGNOSIS — M54.9 BACK PAIN, UNSPECIFIED BACK LOCATION, UNSPECIFIED BACK PAIN LATERALITY, UNSPECIFIED CHRONICITY: ICD-10-CM

## 2021-11-22 DIAGNOSIS — F33.41 RECURRENT MAJOR DEPRESSIVE DISORDER, IN PARTIAL REMISSION (H): ICD-10-CM

## 2021-11-22 DIAGNOSIS — J45.40 MODERATE PERSISTENT ASTHMA WITHOUT COMPLICATION: Primary | ICD-10-CM

## 2021-11-22 PROCEDURE — 99605 MTMS BY PHARM NP 15 MIN: CPT | Performed by: PHARMACIST

## 2021-11-22 PROCEDURE — 99607 MTMS BY PHARM ADDL 15 MIN: CPT | Performed by: PHARMACIST

## 2021-11-22 NOTE — PATIENT INSTRUCTIONS
Recommendations from today's MTM visit:                                                    MTM (medication therapy management) is a service provided by a clinical pharmacist designed to help you get the most of out of your medicines.   Today we reviewed what your medicines are for, how to know if they are working, that your medicines are safe and how to make your medicine regimen as easy as possible.      1. Set an alarm on your phone to help you remember to take montelukast at night.  2. We will send a note to Hilaria France and discuss starting an Advair inhaler.  3. You can receive a Tdap (Tetnus) vaccine at any pharmacy. Call ahead to see if you need to make an appointment.    Future considerations:  1. Could decrease sertraline dose if your symptoms have been well controlled for at least 6-9 months. Discuss this with your physician before making any changes to your sertraline dose.     Follow-up: Return in 4 weeks (on 12/20/2021) for MTM Pharmacist Visit, using a phone visit.    It was great to speak with you today.  I value your experience and would be very thankful for your time with providing feedback on our clinic survey. You may receive a survey via email or text message in the next few days.     To schedule another MTM appointment, please call the clinic directly or you may call the MTM scheduling line at [phone] or toll-free at [phone].     My Clinical Pharmacist's contact information:                                                      Please feel free to contact me with any questions or concerns you have.     Oly Spencer, Miko  Medication Therapy Management Resident  Pager: [phone]

## 2021-11-22 NOTE — PROGRESS NOTES
Medication Therapy Management (MTM) Encounter    ASSESSMENT:                            Medication Adherence/Access: Patient would benefit from setting a phone alarm to assist in remembering nighttime medication of montelukast.    Asthma: Patient's control could be improved. Most recent asthma Control Test (ACT) was <20 indicating poor control. Per 2021 SONYA guidelines and 2020 NHLBI guidelines, patient should step up therapy to step 3 (ICS/LABA) due to utilizing rescue inhaler daily and waking up > 1 night per week with symptoms.  Would benefit from replacing current ICS inhaler with an ICS/LABA for better control. Would benefit from taking montelukast every night to help with asthma control.    GERD: Stable.    Depression: Patient is meeting PHQ9 goal <5. Patient would benefit from continued follow up with PCP. Could consider decreasing dose of sertraline if patient has been well controlled for at least 6-9 months.     Pain: Stable. Pain is well controlled on medications when needed and no changes recommended.    Vaccines: Per ACIP guidelines, patient is eligible for Tdap vaccination.     PLAN:                            1. Set an alarm on your phone to help you remember to take montelukast at night.  2. We will send a note to Hilaria France and discuss starting an Advair inhaler.  3. You can receive a Tdap (Tetnus) vaccine at any pharmacy. Call ahead to see if you need to make an appointment.    Future considerations:  1. Could decrease sertraline dose if your symptoms have been well controlled for at least 6-9 months. Discuss this with your physician before making any changes to your sertraline dose.     Follow-up: Return in 4 weeks (on 12/20/2021) for MTM Pharmacist Visit, using a phone visit.      SUBJECTIVE/OBJECTIVE:                          Jorge Madrigal is a 36 year old male called for an initial visit. He was referred to me from Hilaria France.    Reason for visit: Asthma  therapy    Allergies/ADRs: Reviewed in chart  Past Medical History: Reviewed in chart  Tobacco: He reports that he has never smoked. He has never used smokeless tobacco.  Alcohol: not currently using    Medication Adherence/Access: Patient uses pill box(es).  Patient takes medications 2 time(s) per day.   Per patient, misses medication 3 times per week. States he misses his medications particularly at night which is when he takes montelukast.  The patient fills medications at [place]: NO, fills medications at [facility].    Asthma:  Current asthma medications: Albuterol inhaler as needed, alubterol nebs as needed (once every 1-3 months), Flovent 220 mcg 1 puff twice daily, montelukast 10 mg nightly, cetirizine 10 mg daily. Has prednisone 40 mg daily on hand at home.  States he is using rescue inhaler 1-5 times daily and is waking up overnight 2-3 times weekly. Feels rescue inhaler works when needed but doesn't like using it as much as he is. Feels jittery after taking inhalers and rinses mouth after using Flovent. Reports he misses montelukast dose about 3 times per week since he takes this medication at night and forgets to take it before bed.     Previously used Advair (stopped due to insurance coverage about 2 years ago - unsure if he had [employer] insurance at the time)  11/11 visit: Currently reports increased symptoms- SOB upon waking and at bedtime, cough; no wheezing; no f/c.  No CP.  Had COVID test yesterday which was negative    ACT Total Scores 5/15/2020 1/22/2021 11/11/2021   ACT TOTAL SCORE (Goal Greater than or Equal to 20) 21 22 14   In the past 12 months, how many times did you visit the emergency room for your asthma without being admitted to the hospital? 0 0 0   In the past 12 months, how many times were you hospitalized overnight because of your asthma? 0 0 0     GERD: Currently taking omeprazole 20 mg daily. States he has heartburn symptoms if he eats late at night or has acidic  "foods.    Depression:  Current medications include: sertraline 100 mg daily. States he doesn't notice depression symptoms anymore and feels like his mental health has improved greatly. Asked if there were any long-term risks taking this medication. Responded there were not any significant risks and at this time the benefit of improved mental health outweighed any risks.    PHQ 5/15/2020 1/22/2021 11/11/2021   PHQ-9 Total Score 1 4 0   Q9: Thoughts of better off dead/self-harm past 2 weeks Not at all Not at all Not at all     EVERETTE-7 SCORE 1/4/2020 11/11/2021   Total Score 5 (mild anxiety) -   Total Score 5 0     Pain: Currently taking Tylenol 325-650 mg as needed, ibuprofen 200-400 mg as needed, cyclobenzaprine 10 mg three times daily as needed. States he needs these medications \"every once in a while\" for low back pain. Feels very drowsy after taking cyclobenzaprine but medications are effective for pain.    Vaccines: Due for Tdap.      Today's Vitals: There were no vitals taken for this visit.  ----------------    I spent 34 minutes with this patient today. I offer these suggestions for consideration by Hilaria France. A copy of the visit note was provided to the patient's primary care provider.    The patient was sent via [employer] a summary of these recommendations.     Mr. Madrigal was seen independently by Dr. Spencer. I have reviewed and agree with the resident note and plan of care.  Brittany Chaudhari, PharmD      Oly Spencer, PharmD  Medication Therapy Management Resident  Pager: [phone]    Telemedicine Visit Details  Type of service:  Telephone visit  Start Time: 7:38 AM  End Time: 8:12 AM  Originating Location (patient location): [place]  Distant Location (provider location):  [facility]     Medication Therapy Recommendations  Moderate persistent asthma without complication    Current Medication: fluticasone (FLOVENT HFA) 220 MCG/ACT inhaler   Rationale: More effective " medication available - Ineffective medication - Effectiveness   Recommendation: Change Medication - Advair Diskus 250-50 MCG/DOSE Aepb   Status: Contact Provider - Awaiting Response          Current Medication: montelukast (SINGULAIR) 10 MG tablet   Rationale: Does not understand instructions - Adherence - Adherence   Recommendation: Provide Education   Status: Patient Agreed - Adherence/Education         Vaccine counseling    Rationale: Preventive therapy - Needs additional medication therapy - Indication   Recommendation: Start Medication - Tdap (tetanus-diphtheria-acell pertussis) 5-2-15.5 LF-MCG/0.5 injection   Status: Accepted - no CPA Needed

## 2021-11-23 ASSESSMENT — ENCOUNTER SYMPTOMS
MYALGIAS: 0
HEMATURIA: 0
NERVOUS/ANXIOUS: 0
FREQUENCY: 0
HEADACHES: 0
WEAKNESS: 0
DYSURIA: 0
CHILLS: 0
DIARRHEA: 0
ABDOMINAL PAIN: 0
CONSTIPATION: 0
SHORTNESS OF BREATH: 1
ARTHRALGIAS: 1
EYE PAIN: 0
HEMATOCHEZIA: 0
DIZZINESS: 0
FEVER: 0
PARESTHESIAS: 0
HEARTBURN: 1
PALPITATIONS: 0
NAUSEA: 0
JOINT SWELLING: 0
COUGH: 0
SORE THROAT: 0

## 2021-11-29 ENCOUNTER — OFFICE VISIT (OUTPATIENT)
Dept: INTERNAL MEDICINE | Facility: CLINIC | Age: 36
End: 2021-11-29
Payer: COMMERCIAL

## 2021-11-29 VITALS
WEIGHT: 283.1 LBS | HEART RATE: 70 BPM | DIASTOLIC BLOOD PRESSURE: 90 MMHG | HEIGHT: 70 IN | TEMPERATURE: 98.3 F | SYSTOLIC BLOOD PRESSURE: 142 MMHG | BODY MASS INDEX: 40.53 KG/M2 | RESPIRATION RATE: 16 BRPM

## 2021-11-29 DIAGNOSIS — Z13.1 SCREENING FOR DIABETES MELLITUS: ICD-10-CM

## 2021-11-29 DIAGNOSIS — F10.11 ALCOHOL USE DISORDER, MILD, IN SUSTAINED REMISSION: ICD-10-CM

## 2021-11-29 DIAGNOSIS — E66.01 MORBID OBESITY (H): ICD-10-CM

## 2021-11-29 DIAGNOSIS — Z11.59 NEED FOR HEPATITIS C SCREENING TEST: ICD-10-CM

## 2021-11-29 DIAGNOSIS — Z00.00 ROUTINE GENERAL MEDICAL EXAMINATION AT A HEALTH CARE FACILITY: Primary | ICD-10-CM

## 2021-11-29 DIAGNOSIS — R73.9 HYPERGLYCEMIA: ICD-10-CM

## 2021-11-29 DIAGNOSIS — F33.42 RECURRENT MAJOR DEPRESSIVE DISORDER, IN FULL REMISSION (H): ICD-10-CM

## 2021-11-29 DIAGNOSIS — Z13.220 LIPID SCREENING: ICD-10-CM

## 2021-11-29 DIAGNOSIS — K21.9 GASTROESOPHAGEAL REFLUX DISEASE, UNSPECIFIED WHETHER ESOPHAGITIS PRESENT: ICD-10-CM

## 2021-11-29 DIAGNOSIS — J45.40 MODERATE PERSISTENT ASTHMA WITHOUT COMPLICATION: ICD-10-CM

## 2021-11-29 LAB — HCV AB SERPL QL IA: NONREACTIVE

## 2021-11-29 PROCEDURE — 90715 TDAP VACCINE 7 YRS/> IM: CPT | Performed by: INTERNAL MEDICINE

## 2021-11-29 PROCEDURE — 90472 IMMUNIZATION ADMIN EACH ADD: CPT | Performed by: INTERNAL MEDICINE

## 2021-11-29 PROCEDURE — 80061 LIPID PANEL: CPT | Performed by: INTERNAL MEDICINE

## 2021-11-29 PROCEDURE — 80048 BASIC METABOLIC PNL TOTAL CA: CPT | Performed by: INTERNAL MEDICINE

## 2021-11-29 PROCEDURE — 36415 COLL VENOUS BLD VENIPUNCTURE: CPT | Performed by: INTERNAL MEDICINE

## 2021-11-29 PROCEDURE — 90732 PPSV23 VACC 2 YRS+ SUBQ/IM: CPT | Performed by: INTERNAL MEDICINE

## 2021-11-29 PROCEDURE — 99395 PREV VISIT EST AGE 18-39: CPT | Mod: 25 | Performed by: INTERNAL MEDICINE

## 2021-11-29 PROCEDURE — 86803 HEPATITIS C AB TEST: CPT | Performed by: INTERNAL MEDICINE

## 2021-11-29 PROCEDURE — 90471 IMMUNIZATION ADMIN: CPT | Performed by: INTERNAL MEDICINE

## 2021-11-29 RX ORDER — SERTRALINE HYDROCHLORIDE 100 MG/1
100 TABLET, FILM COATED ORAL DAILY
Qty: 90 TABLET | Refills: 3 | Status: SHIPPED | OUTPATIENT
Start: 2021-11-29 | End: 2022-10-24

## 2021-11-29 ASSESSMENT — ENCOUNTER SYMPTOMS
HEARTBURN: 1
EYE PAIN: 0
PARESTHESIAS: 0
SHORTNESS OF BREATH: 1
WEAKNESS: 0
FEVER: 0
HEADACHES: 0
HEMATOCHEZIA: 0
DIZZINESS: 0
SORE THROAT: 0
CONSTIPATION: 0
ARTHRALGIAS: 1
ABDOMINAL PAIN: 0
PALPITATIONS: 0
HEMATURIA: 0
JOINT SWELLING: 0
CHILLS: 0
DYSURIA: 0
NERVOUS/ANXIOUS: 0
COUGH: 0
DIARRHEA: 0
MYALGIAS: 0
FREQUENCY: 0
NAUSEA: 0

## 2021-11-29 ASSESSMENT — MIFFLIN-ST. JEOR: SCORE: 2220.38

## 2021-11-29 NOTE — PROGRESS NOTES
SUBJECTIVE:   CC: Jorge Madrigal is an 36 year old male who presents for preventative health visit.     Patient has been advised of split billing requirements and indicates understanding: Yes  Healthy Habits:     Getting at least 3 servings of Calcium per day:  Yes    Bi-annual eye exam:  NO    Dental care twice a year:  Yes    Sleep apnea or symptoms of sleep apnea:  Excessive snoring    Diet:  Regular (no restrictions)    Frequency of exercise:  2-3 days/week    Duration of exercise:  15-30 minutes    Taking medications regularly:  Yes    Medication side effects:  None    PHQ-2 Total Score: 0    Additional concerns today:  No        Today's PHQ-2 Score:   PHQ-2 ( 1999 Pfizer) 11/23/2021   Q1: Little interest or pleasure in doing things 0   Q2: Feeling down, depressed or hopeless 0   PHQ-2 Score 0   PHQ-2 Total Score (12-17 Years)- Positive if 3 or more points; Administer PHQ-A if positive -   Q1: Little interest or pleasure in doing things Not at all   Q2: Feeling down, depressed or hopeless Not at all   PHQ-2 Score 0     Abuse: Current or Past(Physical, Sexual or Emotional)- NO  Do you feel safe in your environment? YES    Have you ever done Advance Care Planning? (For example, a Health Directive, POLST, or a discussion with a medical provider or your loved ones about your wishes): No, advance care planning information given to patient to review.  Patient plans to discuss their wishes with loved ones or provider.      Social History     Tobacco Use     Smoking status: Never Smoker     Smokeless tobacco: Never Used     Tobacco comment: Occasionally   Substance Use Topics     Alcohol use: Not Currently       Alcohol Use 11/23/2021   Prescreen: >3 drinks/day or >7 drinks/week? Not Applicable   Prescreen: >3 drinks/day or >7 drinks/week? -   AUDIT SCORE  -       Last PSA: No results found for: PSA    Reviewed orders with patient. Reviewed health maintenance and updated orders accordingly - Yes  Lab work is in  "process  Labs reviewed in EPIC    Reviewed and updated as needed this visit by clinical staff  Tobacco  Allergies  Meds   Med Hx  Surg Hx  Fam Hx  Soc Hx       Reviewed and updated as needed this visit by Provider  Tobacco       Fam Hx            Review of Systems   Constitutional: Negative for chills and fever.   HENT: Negative for congestion, ear pain, hearing loss and sore throat.    Eyes: Negative for pain and visual disturbance.   Respiratory: Positive for shortness of breath. Negative for cough.    Cardiovascular: Negative for chest pain, palpitations and peripheral edema.   Gastrointestinal: Positive for heartburn. Negative for abdominal pain, constipation, diarrhea, hematochezia and nausea.   Genitourinary: Negative for dysuria, frequency, genital sores, hematuria, impotence and penile discharge.   Musculoskeletal: Positive for arthralgias. Negative for joint swelling and myalgias.   Skin: Negative for rash.   Neurological: Negative for dizziness, weakness, headaches and paresthesias.   Psychiatric/Behavioral: Negative for mood changes. The patient is not nervous/anxious.        OBJECTIVE:   BP (!) 142/90   Pulse 70   Temp 98.3  F (36.8  C) (Temporal)   Resp 16   Ht 1.778 m (5' 10\")   Wt 128.4 kg (283 lb 1.6 oz)   BMI 40.62 kg/m      Physical Exam  GENERAL: alert, no distress and over weight  EYES: Eyes grossly normal to inspection, PERRL and conjunctivae and sclerae normal  HENT: ear canals and TM's normal, nose and mouth without ulcers or lesions  NECK: no adenopathy, no asymmetry, masses, or scars and thyroid normal to palpation  RESP: lungs clear to auscultation - no rales, rhonchi or wheezes  CV: regular rate and rhythm, normal S1 S2, no S3 or S4, no murmur, click or rub, no peripheral edema and peripheral pulses strong  ABDOMEN: soft, nontender, no hepatosplenomegaly, no masses and bowel sounds normal  MS: no gross musculoskeletal defects noted, no edema  SKIN: no suspicious lesions or " "rashes  NEURO: Normal strength and tone, mentation intact and speech normal  PSYCH: mentation appears normal, affect normal/bright  LYMPH: no cervical, supraclavicular, axillary, or inguinal adenopathy        ASSESSMENT/PLAN:       ICD-10-CM    1. Routine general medical examination at a health care facility  Z00.00    2. Moderate persistent asthma without complication  J45.40    3. Recurrent major depressive disorder, in full remission (H)  F33.42 sertraline (ZOLOFT) 100 MG tablet   4. Alcohol use disorder, mild, in sustained remission  F10.11    5. Morbid obesity (H)  E66.01    6. Need for hepatitis C screening test  Z11.59 Hepatitis C Screen Reflex to HCV RNA Quant and Genotype     Hepatitis C Screen Reflex to HCV RNA Quant and Genotype   7. Lipid screening  Z13.220 Lipid panel reflex to direct LDL Fasting     Lipid panel reflex to direct LDL Fasting   8. Screening for diabetes mellitus  Z13.1 Basic metabolic panel  (Ca, Cl, CO2, Creat, Gluc, K, Na, BUN)     Basic metabolic panel  (Ca, Cl, CO2, Creat, Gluc, K, Na, BUN)   9. Gastroesophageal reflux disease, unspecified whether esophagitis present  K21.9 omeprazole (PRILOSEC) 20 MG DR capsule     -Updated screening, immunizations, prevention.  Please see health maintenance list, care gaps  -weight loss  -BP elevated- states at home/work BP runs 120s systolic  -cont anti-depressant. Excellent control  -sober > 1 yr. Doing great.   -cont advair for asthma. Notices improvement with just a few weeks of rx.     Patient has been advised of split billing requirements and indicates understanding: Yes  COUNSELING:   Reviewed preventive health counseling, as reflected in patient instructions    Estimated body mass index is 40.62 kg/m  as calculated from the following:    Height as of this encounter: 1.778 m (5' 10\").    Weight as of this encounter: 128.4 kg (283 lb 1.6 oz).     Weight management plan: Discussed healthy diet and exercise guidelines    He reports that he has " never smoked. He has never used smokeless tobacco.      Counseling Resources:  ATP IV Guidelines  Pooled Cohorts Equation Calculator  FRAX Risk Assessment  ICSI Preventive Guidelines  Dietary Guidelines for Americans, 2010  USDA's MyPlate  ASA Prophylaxis  Lung CA Screening    Otto Kenney MD  [facility]

## 2021-11-29 NOTE — PATIENT INSTRUCTIONS
When checking your blood pressure at home make sure you do the followin. No exercise, caffeine or nicotine within the past 30 minutes  2. Sit down and relax for 5 to 10 minutes before checking  3.   Check your blood pressure 3 times  by 1 minute intervals.  Average the 3 readings to get your final value.  4.   If you do not like math instead of averaging the 3 values you can cross out the high and low readings and use the middle value.          Preventive Health Recommendations  Male Ages 26 - 39    Yearly exam:             See your health care provider every year in order to  o   Review health changes.   o   Discuss preventive care.    o   Review your medicines if your doctor has prescribed any.    You should be tested each year for STDs (sexually transmitted diseases), if you re at risk.     After age 35, talk to your provider about cholesterol testing. If you are at risk for heart disease, have your cholesterol tested at least every 5 years.     If you are at risk for diabetes, you should have a diabetes test (fasting glucose).  Shots: Get a flu shot each year. Get a tetanus shot every 10 years.     Nutrition:    Eat at least 5 servings of fruits and vegetables daily.     Eat whole-grain bread, whole-wheat pasta and brown rice instead of white grains and rice.     Get adequate Calcium and Vitamin D.     Lifestyle    Exercise for at least 150 minutes a week (30 minutes a day, 5 days a week). This will help you control your weight and prevent disease.     Limit alcohol to one drink per day.     No smoking.     Wear sunscreen to prevent skin cancer.   See your dentist every six months for an exam and cleaning.     Patient Education     Eating Heart-Healthy Food: Using the DASH Plan    Eating for your heart doesn t have to be hard or boring. You just need to know how to make healthier choices. The DASH eating plan has been developed to help you do just that. DASH stands for Dietary Approaches to Stop  Hypertension. It is a plan that has been proven to be healthier for your heart and to lower your risk for high blood pressure. It can also help lower your risk for cancer, heart disease, osteoporosis, and diabetes.  Choosing from each food group  Choose foods from each of the food groups below each day. Try to get the recommended number of servings for each food group. The serving numbers are based on a diet of 2,000 calories a day. Talk with your healthcare provider if you re not sure about your calorie needs. Along with getting the correct servings, the DASH plan also advises less than 2,300 mg of salt (sodium) per day. Lowering sodium intake to 1,500 mg per day lowers blood pressure even more. (There's about 2,300 mg of sodium in 1 teaspoon of salt.)      Grains  Servings: 6 to 8 a day  A serving is:    1 slice bread    1 ounce dry cereal    Half a cup cooked rice, pasta or cereal  Best choices: Whole grains and any grains high in fiber. Vegetables  Servings: 4 to 5 a day  A serving is:    1 cup raw leafy vegetable    Half a cup cut-up raw or cooked vegetable    Half a cup vegetable juice  Best choices: Fresh or frozen vegetables prepared without added salt or fat.   Fruits  Servings: 4 to 5 a day  A serving is:    1 medium fruit    One-quarter cup dried fruit    Half a cup fresh, frozen, or canned fruit    Half a cup of 100% fruit juices  Best choices: A variety of fresh fruits of different colors. Whole fruits are a better choice than fruit juices. Low-fat or fat-free dairy  Servings: 2 to 3 a day  A serving is:    1 cup milk    1 cup yogurt    One and a half ounces cheese  Best choices: Skim or 1% milk, low-fat or fat-free yogurt or buttermilk, and low-fat cheeses.         Lean meats, poultry, fish  Servings: 6 or fewer a day  A serving is:    1 ounce cooked meats, poultry, or fish    1 egg  Best choices: Lean poultry and fish. Trim away visible fat. Broil, grill, roast, or boil instead of frying. Remove skin  from poultry before eating. Limit how much red meat you eat.  Nuts, seeds, beans  Servings: 4 to 5 a week  A serving is:    One-third cup nuts (one and a half ounces)    2 tablespoons nut butter or seeds    Half a cup cooked dry beans or legumes  Best choices: Dry roasted nuts with no salt added, lentils, kidney beans, garbanzo beans, and whole taylor beans.   Fats and oils  Servings: 2 to 3 a day  A serving is:    1 teaspoon vegetable oil    1 teaspoon soft margarine    1 tablespoon mayonnaise    2 tablespoons salad dressing  Best choices: Nut and vegetable oils (nontropical vegetable oils), such as olive and canola oil. Sweets  Servings: 5 a week or fewer  A serving is:    1 tablespoon sugar, maple syrup, or honey    1 tablespoon jam or jelly    1 half-ounce jelly beans (about 15)    1 cup lemonade  Best choices: Dried fruit can be a satisfying sweet. Choose low-fat sweets. And watch your serving sizes!      For more on the DASH eating plan, visit:  www.nhlbi.nih.gov/health/health-topics/topics/dash   Dom[employer] last reviewed this educational content on 7/1/2019 2000-2021 [employer]. All rights reserved. This information is not intended as a substitute for professional medical care. Always follow your healthcare professional's instructions.

## 2021-11-30 LAB
ANION GAP SERPL CALCULATED.3IONS-SCNC: 5 MMOL/L (ref 3–14)
BUN SERPL-MCNC: 18 MG/DL (ref 7–30)
CALCIUM SERPL-MCNC: 9 MG/DL (ref 8.5–10.1)
CHLORIDE BLD-SCNC: 103 MMOL/L (ref 94–109)
CHOLEST SERPL-MCNC: 267 MG/DL
CO2 SERPL-SCNC: 26 MMOL/L (ref 20–32)
CREAT SERPL-MCNC: 0.97 MG/DL (ref 0.66–1.25)
FASTING STATUS PATIENT QL REPORTED: YES
GFR SERPL CREATININE-BSD FRML MDRD: >90 ML/MIN/1.73M2
GLUCOSE BLD-MCNC: 124 MG/DL (ref 70–99)
HDLC SERPL-MCNC: 48 MG/DL
LDLC SERPL CALC-MCNC: 140 MG/DL
NONHDLC SERPL-MCNC: 219 MG/DL
POTASSIUM BLD-SCNC: 4.4 MMOL/L (ref 3.4–5.3)
SODIUM SERPL-SCNC: 134 MMOL/L (ref 133–144)
TRIGL SERPL-MCNC: 395 MG/DL

## 2021-11-30 ASSESSMENT — ASTHMA QUESTIONNAIRES: ACT_TOTALSCORE: 14

## 2022-01-25 ENCOUNTER — MYC MEDICAL ADVICE (OUTPATIENT)
Dept: PHARMACY | Facility: CLINIC | Age: 37
End: 2022-01-25
Payer: COMMERCIAL

## 2022-01-25 DIAGNOSIS — J45.20 MILD INTERMITTENT ASTHMA, UNSPECIFIED WHETHER COMPLICATED: ICD-10-CM

## 2022-01-26 ENCOUNTER — MYC MEDICAL ADVICE (OUTPATIENT)
Dept: INTERNAL MEDICINE | Facility: CLINIC | Age: 37
End: 2022-01-26
Payer: COMMERCIAL

## 2022-01-27 RX ORDER — ALBUTEROL SULFATE 90 UG/1
AEROSOL, METERED RESPIRATORY (INHALATION)
Qty: 18 G | Refills: 11 | Status: SHIPPED | OUTPATIENT
Start: 2022-01-27 | End: 2023-03-16

## 2022-01-27 ASSESSMENT — ASTHMA QUESTIONNAIRES: ACT_TOTALSCORE: 20

## 2022-01-27 NOTE — TELEPHONE ENCOUNTER
"Routing refill request to provider for review/approval because:  abdominal CT scan completed via [employer] on 1/26  \"   Here are the answers:     Question #1:    5  Question #2:    4  Question #3:    4  Question #4:    3  Question #5:    4  TOTAL SCORE: 20  # of Emergency Room visits related to asthma in the last 12 months:        0   # of hospitalizations related  to asthma in the last 12 months:   0     Thank you!     I hope this helps.      Jorge Madrigal\"    Chetan Cotter RN  [facility] Triage Nurse   "

## 2022-01-28 ENCOUNTER — MYC MEDICAL ADVICE (OUTPATIENT)
Dept: INTERNAL MEDICINE | Facility: CLINIC | Age: 37
End: 2022-01-28
Payer: COMMERCIAL

## 2022-04-29 ENCOUNTER — OFFICE VISIT (OUTPATIENT)
Dept: DERMATOLOGY | Facility: CLINIC | Age: 37
End: 2022-04-29

## 2022-04-29 VITALS — SYSTOLIC BLOOD PRESSURE: 133 MMHG | OXYGEN SATURATION: 97 % | DIASTOLIC BLOOD PRESSURE: 83 MMHG | HEART RATE: 67 BPM

## 2022-04-29 DIAGNOSIS — L81.4 LENTIGO: ICD-10-CM

## 2022-04-29 DIAGNOSIS — D18.01 ANGIOMA OF SKIN: ICD-10-CM

## 2022-04-29 DIAGNOSIS — L82.1 SEBORRHEIC KERATOSIS: ICD-10-CM

## 2022-04-29 DIAGNOSIS — D22.9 NEVUS: ICD-10-CM

## 2022-04-29 DIAGNOSIS — L71.9 ROSACEA: Primary | ICD-10-CM

## 2022-04-29 PROCEDURE — 99204 OFFICE O/P NEW MOD 45 MIN: CPT | Performed by: PHYSICIAN ASSISTANT

## 2022-04-29 RX ORDER — METRONIDAZOLE 7.5 MG/G
GEL TOPICAL
Qty: 45 G | Refills: 11 | Status: SHIPPED | OUTPATIENT
Start: 2022-04-29

## 2022-04-29 NOTE — PROGRESS NOTES
HPI:   Chief complaints: Jorge Madrigal is a pleasant 37 year old male who presents for Full skin cancer screening to rule out skin cancer   Last Skin Exam: n/a      1st Baseline: yes  Personal HX of Skin Cancer: no   Personal HX of Malignant Melanoma: no   Family HX of Skin Cancer / Malignant Melanoma: no  Personal HX of Atypical Moles:   no  Risk factors: history of sun exposure and burns  New / Changing lesions:yes new spot on the forearm  Social History: has a 3 yo daughter and a 9 mo old son  On review of systems, there are no further skin complaints, patient is feeling otherwise well.   ROS of the following were done and are negative: Constitutional, Eyes, Ears, Nose,   Mouth, Throat, Cardiovascular, Respiratory, GI, Genitourinary, Musculoskeletal,   Psychiatric, Endocrine, Allergic/Immunologic.    PHYSICAL EXAM:   /83   Pulse 67   SpO2 97%   Skin exam performed as follows: Type 2 skin. Mood appropriate  Alert and Oriented X 3. Well developed, well nourished in no distress.  General appearance: Normal  Head including face: Normal  Eyes: conjunctiva and lids: Normal  Mouth: Lips, teeth, gums: Normal  Neck: Normal  Chest-breast/axillae: Normal  Back: Normal  Spleen and liver: Normal  Cardiovascular: Exam of peripheral vascular system by observation for swelling, varicosities, edema: Normal  Genitalia: groin, buttocks: Normal  Extremities: digits/nails (clubbing): Normal  Eccrine and Apocrine glands: Normal  Right upper extremity: Normal  Left upper extremity: Normal  Right lower extremity: Normal  Left lower extremity: Normal  Skin: Scalp and body hair: See below    Pt deferred exam of breasts, groin, buttocks: No    Other physical findings:  1. Multiple pigmented macules on extremities and trunk  2. Multiple pigmented macules on face, trunk and extremities  3. Multiple vascular papules on trunk, arms and legs  4. Multiple scattered keratotic plaques  5. Papules and pustules with background erythema  on face         Except as noted above, no other signs of skin cancer or melanoma.     ASSESSMENT/PLAN:   Benign Full skin cancer screening today. . Patient with history of none  Advised on monthly self exams and 1 year  Patient Education: Appropriate brochures given.    1. Multiple benign appearing melanocytic nevi on arms, legs and trunk. Discussed ABCDEs of melanoma and sunscreen.   2. Multiple lentigos on arms, legs and trunk. Advised benign, no treatment needed.  3. Multiple scattered angiomas. Advised benign, no treatment needed.   4. Seborrheic keratosis on arms, legs and trunk. Advised benign, no treatment needed.  5. Rosacea on the face  --Start metrogel QHS            Follow-up: FSE every 1-2 years/PRN sooner    1.) Patient was asked about new and changing moles. YES  2.) Patient received a complete physical skin examination: YES  3.) Patient was counseled to perform a monthly self skin examination: YES  Scribed By: Opal Blake, MS, PA-C

## 2022-04-29 NOTE — LETTER
4/29/2022         RE: Jorge Madrigal  [address] [facility] [address]        Dear Colleague,    Thank you for referring your patient, Jorge Madrigal, to the [facility]. Please see a copy of my visit note below.    HPI:   Chief complaints: Jorge Madrigal is a pleasant 37 year old male who presents for Full skin cancer screening to rule out skin cancer   Last Skin Exam: n/a      1st Baseline: yes  Personal HX of Skin Cancer: no   Personal HX of Malignant Melanoma: no   Family HX of Skin Cancer / Malignant Melanoma: no  Personal HX of Atypical Moles:   no  Risk factors: history of sun exposure and burns  New / Changing lesions:yes new spot on the forearm  Social History: has a 3 yo daughter and a 9 mo old son  On review of systems, there are no further skin complaints, patient is feeling otherwise well.   ROS of the following were done and are negative: Constitutional, Eyes, Ears, Nose,   Mouth, Throat, Cardiovascular, Respiratory, GI, Genitourinary, Musculoskeletal,   Psychiatric, Endocrine, Allergic/Immunologic.    PHYSICAL EXAM:   /83   Pulse 67   SpO2 97%   Skin exam performed as follows: Type 2 skin. Mood appropriate  Alert and Oriented X 3. Well developed, well nourished in no distress.  General appearance: Normal  Head including face: Normal  Eyes: conjunctiva and lids: Normal  Mouth: Lips, teeth, gums: Normal  Neck: Normal  Chest-breast/axillae: Normal  Back: Normal  Spleen and liver: Normal  Cardiovascular: Exam of peripheral vascular system by observation for swelling, varicosities, edema: Normal  Genitalia: groin, buttocks: Normal  Extremities: digits/nails (clubbing): Normal  Eccrine and Apocrine glands: Normal  Right upper extremity: Normal  Left upper extremity: Normal  Right lower extremity: Normal  Left lower extremity: Normal  Skin: Scalp and body hair: See below    Pt deferred exam of breasts, groin, buttocks: No    Other physical  findings:  1. Multiple pigmented macules on extremities and trunk  2. Multiple pigmented macules on face, trunk and extremities  3. Multiple vascular papules on trunk, arms and legs  4. Multiple scattered keratotic plaques  5. Papules and pustules with background erythema on face         Except as noted above, no other signs of skin cancer or melanoma.     ASSESSMENT/PLAN:   Benign Full skin cancer screening today. . Patient with history of none  Advised on monthly self exams and 1 year  Patient Education: Appropriate brochures given.    1. Multiple benign appearing melanocytic nevi on arms, legs and trunk. Discussed ABCDEs of melanoma and sunscreen.   2. Multiple lentigos on arms, legs and trunk. Advised benign, no treatment needed.  3. Multiple scattered angiomas. Advised benign, no treatment needed.   4. Seborrheic keratosis on arms, legs and trunk. Advised benign, no treatment needed.  5. Rosacea on the face  --Start metrogel QHS            Follow-up: FSE every 1-2 years/PRN sooner    1.) Patient was asked about new and changing moles. YES  2.) Patient received a complete physical skin examination: YES  3.) Patient was counseled to perform a monthly self skin examination: YES  Scribed By: Opal Blake, MS, PATiffanieC          Again, thank you for allowing me to participate in the care of your patient.        Sincerely,        Opal Blake PA-C

## 2022-04-29 NOTE — PATIENT INSTRUCTIONS
For the legs, this is pigmented purpuric dermatosis    Try vitamin C and Rutin - sometimes this can help.    For the rosacea, start metrogel 1-2 times daily

## 2022-06-06 ENCOUNTER — OFFICE VISIT (OUTPATIENT)
Dept: URGENT CARE | Facility: URGENT CARE | Age: 37
End: 2022-06-06
Payer: COMMERCIAL

## 2022-06-06 VITALS
OXYGEN SATURATION: 100 % | SYSTOLIC BLOOD PRESSURE: 143 MMHG | RESPIRATION RATE: 18 BRPM | DIASTOLIC BLOOD PRESSURE: 98 MMHG | HEART RATE: 62 BPM

## 2022-06-06 DIAGNOSIS — M25.562 ACUTE PAIN OF BOTH KNEES: Primary | ICD-10-CM

## 2022-06-06 DIAGNOSIS — M25.561 ACUTE PAIN OF BOTH KNEES: Primary | ICD-10-CM

## 2022-06-06 PROCEDURE — 99214 OFFICE O/P EST MOD 30 MIN: CPT | Performed by: FAMILY MEDICINE

## 2022-06-06 RX ORDER — HYDROCODONE BITARTRATE AND ACETAMINOPHEN 5; 325 MG/1; MG/1
1 TABLET ORAL EVERY 6 HOURS PRN
Qty: 8 TABLET | Refills: 0 | Status: SHIPPED | OUTPATIENT
Start: 2022-06-06 | End: 2022-06-08

## 2022-06-06 NOTE — PROGRESS NOTES
SUBJECTIVE:  Chief Complaint   Patient presents with     Knee Pain     Pt states 2 years ago he fell and hurt both knees. Pt states within the last 2 weeks he has been having bilateral knee pain. Pt states he will wake up in the night with the pain. Pt states it is hot to the touch    .ident who presents with a chief complaint of  bilateral knee pain.  Symptoms began 3 week(s) ago , are moderate andstill present and worsening.  Context:Injury: no    Past Medical History:   Diagnosis Date     Alcohol dependence in remission (H)      Asthma      Depressive disorder      GERD (gastroesophageal reflux disease)        Past Surgical History:   Procedure Laterality Date     COLONOSCOPY  2015    Normal       Family History   Problem Relation Age of Onset     Colon Cancer Mother 60     Substance Abuse Mother      Mental Illness Mother      Substance Abuse Father         alcohol     Mental Illness Father      Substance Abuse Maternal Grandfather      Substance Abuse Paternal Grandfather      Suicide No family hx of        Social History     Tobacco Use     Smoking status: Never Smoker     Smokeless tobacco: Never Used     Tobacco comment: Occasionally   Substance Use Topics     Alcohol use: Not Currently       ROS:Review of systems negative except as stated below    EXAM: BP (!) 143/98   Pulse 62   Resp 18   SpO2 100%   Exam:knee  tenderness to palpation and pain with rom  GENERAL APPEARANCE: healthy, alert and no distress  EXTREMITIES: peripheral pulses normal  SKIN: no suspicious lesions or rashes  NEURO: Normal strength and tone, sensory exam grossly normal, mentation intact and speech normal    Xray without acute findings, no fx/DJD read by Damir Quintero D.O.    ASSESSMENT:     ICD-10-CM    1. Acute pain of both knees  M25.561 XR Knee Right 3 Views    M25.562 HYDROcodone-acetaminophen (NORCO) 5-325 MG tablet     Orthopedic  Referral     Pt has a knee brace ordered that will be here tomorrow

## 2022-06-15 ENCOUNTER — OFFICE VISIT (OUTPATIENT)
Dept: ORTHOPEDICS | Facility: CLINIC | Age: 37
End: 2022-06-15
Payer: COMMERCIAL

## 2022-06-15 VITALS
BODY MASS INDEX: 39.14 KG/M2 | HEIGHT: 72 IN | SYSTOLIC BLOOD PRESSURE: 140 MMHG | WEIGHT: 289 LBS | DIASTOLIC BLOOD PRESSURE: 88 MMHG

## 2022-06-15 DIAGNOSIS — M25.561 CHRONIC PAIN OF RIGHT KNEE: Primary | ICD-10-CM

## 2022-06-15 DIAGNOSIS — G89.29 CHRONIC PAIN OF RIGHT KNEE: Primary | ICD-10-CM

## 2022-06-15 PROCEDURE — 99204 OFFICE O/P NEW MOD 45 MIN: CPT | Performed by: FAMILY MEDICINE

## 2022-06-15 RX ORDER — METHYLPREDNISOLONE 4 MG
TABLET, DOSE PACK ORAL
Qty: 21 TABLET | Refills: 0 | Status: SHIPPED | OUTPATIENT
Start: 2022-06-15 | End: 2022-11-03

## 2022-06-15 NOTE — PATIENT INSTRUCTIONS
Thank you for choosing [facility]    [facility] LOCATIONS  [place] [address] [address] [facility], 4th [place], [address] [place] [address]   [phone]       APPOINTMENTS: [phone]    CARE QUESTIONS: [phone],    BILLING QUESTIONS: [phone]    FAX NUMBER: [phone]        Follow up: via [facility] in 2-3 weeks      1. Chronic pain of right knee        Chondromalacia / Patellofemoral Pain    CHONDROMALACIA PATELLAE:  -Pain in the front of your knee due to increased pressure from the knee cap (patella)  -There are many causes including trauma, history of dislocation or subluxation of knee cap but most often it is due to an imbalance of the thigh muscles or weak core muscles which cause irregular tracking of your kneecap on your thigh bone.  -People whose feet pronate (roll in) increase the outward pulling on the kneecap as well    SIGNS AND SYMPTOMS:  -Diffuse knee pain that worsens with stairs, squatting, prolonged sitting, jumping, and similar movements.  -Pain may be achy or sharp  -Stiffness with prolonged sitting  -Occasionally, giving way of the knee, grinding or a catching sensation    TREATMENT:  -regular exercise (biking, swimming, or elliptical are good for cardio)  -weight lifting/plyometrics are helpful but remember to keep your knees behind your toes (don't bend knee more than 90degrees)  -regular core exercises (yoga and pilates)  -arch supports may help during exercise until hips stronger to prevent ankle pronation  *over the counter semi-rigid brands include power step arch supports may be purchased at [employer], [employer] or internet  *custom made orthotics may be ordered by your physician if needed for prolonged treatment  -Stretching and strengthening therapy  -Ice 10-15 minutes after activity. (Ice cups for massage 5-7min)  -Ice and NSAIDs help decrease  inflammation. A good anti-inflammatory NSAID medication is Alleve (220mg). Take 1-2 tabs twice daily with food until pain improves or minimum of 14 days, then as needed for pain. (1-2 tabs twice daily dosing is for patients over 12 years old. Courtney than 11 yo, check dose with doctor.)  -often component of hip weakness that leads to lower extremity (foot, ankle, shin, and knee) problems so a lot of focus will be on core strength and balance  - recommend yoga for core strengthening and stretching  -Perform exercises as instructed through handout or formal therapy if doing. Until then start with the following:  -Ankle strengthening  1) balance on one foot 1-2 min daily  2) once able to balance for 1 minute, start hip strengthening with 4 way motion with straight leg. Tie theraband around ankle and balance on other foot while doing15 reps each direction of straight leg  motion  3) arch raises- tighten bottom of foot and hold x 3-5 sec, repeat 5 times  4) ankle exercises (4 way with theraband)- 3 sets of 10-15 (fatigue) daily  -usually takes several weeks before pain free but longer before return to full activity without pain  --Once released to increase activity, BE PATIENT!    Return to activity guidelines include:  If it hurts, please avoid activity  Start gradually and build up, wait 24 hours before increase intensity and time  Runnin min on treadmill (or somewhere you can get home easily from if you have to stop), start walk 4 min/run 1 min and Repeat 3 times. If pain free for 48 hours, increase to walk 3 min/run 2 min. Continue to increase as such as pain allows. If you develop pain, back off to previous pain-free level and wait 1 week before increasing again.  Follow-up in 6 weeks if not improved or sooner if further concern.  If problem flares again after resolved, restart icing, and exercises.      Standing hamstring stretch: Put the heel of the leg on your injured side on a stool about 15 inches high. Keep  your leg straight. Lean forward, bending at the hips, until you feel a mild stretch in the back of your thigh. Make sure you don't roll your shoulders or bend at the waist when doing this or you will stretch your lower back instead of your leg. Hold the stretch for 15 to 30 seconds. Repeat 3 times.    Quadriceps stretch: Stand at an arm's length away from the wall with your injured side farthest from the wall. Facing straight ahead, brace yourself by keeping one hand against the wall. With your other hand, grasp the ankle on your injured side and pull your heel toward your buttocks. Don't arch or twist your back. Keep your knees together. Hold this stretch for 15 to 30 seconds.    Side-lying leg lift: Lie on your uninjured side. Tighten the front thigh muscles on your injured leg and lift that leg 8 to 10 inches (20 to 25 centimeters) away from the other leg. Keep the leg straight and lower it slowly. Do 2 sets of 15.    Quad sets: Sit on the floor with your injured leg straight and your other leg bent. Press the back of the knee of your injured leg against the floor by tightening the muscles on the top of your thigh. Hold this position 10 seconds. Relax. Do 2 sets of 15.  Straight leg raise: Lie on your back with your legs straight out in front of you. Bend the knee on your uninjured side and place the foot flat on the floor. Tighten the thigh muscle on your injured side and lift your leg about 8 inches off the floor. Keep your leg straight and your thigh muscle tight. Slowly lower your leg back down to the floor. Do 2 sets of 15.    Clam exercise: Lie on your uninjured side with your hips and knees bent and feet together. Slowly raise your top leg toward the ceiling while keeping your heels touching each other. Hold for 2 seconds and lower slowly. Do 2 sets of 15 repetitions.    Wall squat with a ball: Stand with your back, shoulders, and head against a wall. Look straight ahead. Keep your shoulders relaxed and  your feet 3 feet (90 centimeters) from the wall and shoulder's width apart. Place a soccer or basketball-sized ball behind your back. Keeping your back against the wall, slowly squat down to a 45-degree angle. Your thighs will not yet be parallel to the floor. Hold this position for 10 seconds and then slowly slide back up the wall. Repeat 10 times. Build up to 2 sets of 15.    Knee stabilization: Wrap a piece of elastic tubing around the ankle of your uninjured leg. Tie a knot in the other end of the tubing and close it in a door at about ankle height.  Stand facing the door on the leg without tubing (your injured leg) and bend your knee slightly, keeping your thigh muscles tight. Stay in this position while you move the leg with the tubing (the uninjured leg) straight back behind you. Do 2 sets of 15.  Turn 90 degrees so the leg without tubing is closest to the door. Move the leg with tubing away from your body. Do 2 sets of 15.  Turn 90 degrees again so your back is to the door. Move the leg with tubing straight out in front of you. Do 2 sets of 15.  Turn your body 90 degrees again so the leg with tubing is closest to the door. Move the leg with tubing across your body. Do 2 sets of 15.  Hold onto a chair if you need help balancing. This exercise can be made more challenging by standing on a firm pillow or foam mat while you move the leg with tubing.    Resisted terminal knee extension: Make a loop with a piece of elastic tubing by tying a knot in both ends. Close the knot in a door at knee height. Step into the loop with your injured leg so the tubing is around the back of your knee. Lift the other foot off the ground and hold onto a chair for balance, if needed. Bend the knee with tubing about 45 degrees. Slowly straighten your leg, keeping your thigh muscle tight as you do this. Repeat 15 times. Do 2 sets of 15. If you need an easier way to do this, stand on both legs for better support while you do the  exercise.    Standing calf stretch: Stand facing a wall with your hands on the wall at about eye level. Keep your injured leg back with your heel on the floor. Keep the other leg forward with the knee bent. Turn your back foot slightly inward (as if you were pigeon-toed). Slowly lean into the wall until you feel a stretch in the back of your calf. Hold the stretch for 15 to 30 seconds. Return to the starting position. Repeat 3 times. Do this exercise several times each day.    Step-up: Stand with the foot of your injured leg on a support 3 to 5 inches (8 to 13 centimeters) high --like a small step or block of wood. Keep your other foot flat on the floor. Shift your weight onto the injured leg on the support. Straighten your injured leg as the other leg comes off the floor. Return to the starting position by bending your injured leg and slowly lowering your uninjured leg back to the floor. Do 2 sets of 15.    Iliotibial band stretch, side-bending: Cross one leg in front of the other leg and lean in the opposite direction from the front leg. Reach the arm on the side of the back leg over your head while you do this. Hold this position for 15 to 30 seconds. Return to the starting position. Repeat 3 times and then switch legs and repeat the exercise.  Developed by [employer].  Published by [employer].  Copyright  2014 [employer] and/or one of its subsidiaries. All rights reserved.

## 2022-06-15 NOTE — PROGRESS NOTES
"CHIEF COMPLAINT:  Pain of the Right Knee and Pain of the Left Knee       HISTORY OF PRESENT ILLNESS  Mr. Madrigal is a pleasant 37 year old year old male who presents to clinic today with bilateral knee pain.  Jorge explains that his pain began in 2019 when he jumped off a high platform and landed in a deep squat. Has had intermittent pain since then that worsened in the past month. Works as occupational therapist    Onset: sudden, worsening  Location: bilateral knee  Quality:  Constant aching and dull pain, throbbing at night  Duration: 3 years   Timing:constant  Modifying factors:  resting and non-use makes it better, movement and use makes it worse  Associated signs & symptoms: pain, \"pop\" with valgus movement that causes improvement in pain  Previous similar pain: No   Treatments to date: ice, OTCs, arch supports.    Additional history: as documented    Review of Systems: A 10-point review of systems was obtained and is negative except for as noted in the HPI.       MEDICAL HISTORY  Patient Active Problem List   Diagnosis     Moderate persistent asthma without complication     GERD (gastroesophageal reflux disease)     Recurrent major depressive disorder, in partial remission (H)     Screening examination for pulmonary tuberculosis     Morbid obesity (H)     Alcohol use disorder, mild, in sustained remission       Current Outpatient Medications   Medication Sig Dispense Refill     methylPREDNISolone (MEDROL DOSEPAK) 4 MG tablet therapy pack Follow Package Directions 21 tablet 0     acetaminophen (TYLENOL) 325 MG tablet Take 325-650 mg by mouth       albuterol (ACCUNEB) 0.63 MG/3ML neb solution Take 3 mLs (0.63 mg) by nebulization every 4 hours as needed for shortness of breath / dyspnea or wheezing 3 mL 3     cetirizine (ZYRTEC) 10 MG tablet Take 10 mg by mouth daily       fluticasone-salmeterol (ADVAIR) 250-50 MCG/DOSE inhaler Inhale 1 puff into the lungs 2 times daily 60 each 11     ibuprofen (ADVIL/MOTRIN) 200 MG " tablet Take 200-400 mg by mouth       metroNIDAZOLE (METROGEL) 0.75 % external gel Apply to face BID (Patient not taking: Reported on 6/6/2022) 45 g 11     montelukast (SINGULAIR) 10 MG tablet Take 1 tablet (10 mg) by mouth At Bedtime 90 tablet 1     omeprazole (PRILOSEC) 20 MG DR capsule Take 1 capsule (20 mg) by mouth daily       sertraline (ZOLOFT) 100 MG tablet Take 1 tablet (100 mg) by mouth daily 90 tablet 3     VENTOLIN  (90 Base) MCG/ACT inhaler INHALE 2 PUFFS INTO THE LUNGS EVERY 4 HOURS AS NEEDED FOR SHORTNESS OF BREATH OR DIFFICULT BREATHING OR WHEEZING 18 g 11       No Known Allergies    Family History   Problem Relation Age of Onset     Colon Cancer Mother 60     Substance Abuse Mother      Mental Illness Mother      Substance Abuse Father         alcohol     Mental Illness Father      Substance Abuse Maternal Grandfather      Substance Abuse Paternal Grandfather      Suicide No family hx of        Additional medical/Social/Surgical histories [facility] in [facility] and updated as appropriate.       PHYSICAL EXAM  BP (!) 140/88   Ht 1.829 m (6')   Wt 131.1 kg (289 lb)   BMI 39.20 kg/m      General  - normal appearance, in no obvious distress  Musculoskeletal - bilateral knees  - stance: normal gait without limp, painful squat bilateral knees  - inspection: no swelling or effusion, normal muscle tone, normal bone and joint alignment, no obvious deformity +pes planus bilaterally with hindfoot valgus.  - palpation: no joint line tenderness, patellar tendon non-tender, tender medial patellar facet  - ROM: 135 degrees flexion, -5 degrees extension, not painful, crepitus with weight-bearing flexion  - strength: 5/5 in flexion, 5/5 in extension  - special tests:  (-) Lachman  (-) anterior drawer  (-) Pennie  (-) Thessaly  (-) varus at 0 and 30 degrees flexion  (-) valgus at 0 and 30 degrees flexion  (+) patellar compression  (-) patellar apprehension  Neuro  - no sensory or motor deficit, grossly normal  coordination, normal muscle tone  Skin  - no ecchymosis, erythema, warmth, or induration, no obvious rash  Psych  - interactive, appropriate, normal mood and affect    IMAGING : XR knee right 3V. Final results and radiologist's interpretation, available in the [facility] health record. Images were reviewed with the patient/family members in the office today. My personal interpretation of the performed imaging is no acute osseous abnormality or significant degenerative changes of joint.       ASSESSMENT & PLAN  Mr. Madrigal is a 37 year old year old male who presents to clinic today with right > left anterior knee pain increasing over the past month. Exam with exquisite tenderness medial patellar facet, exacerbated with squat. Concern at this time for underlying chondromalacia despite normal xrays.    Diagnosis:   1. Acute on chronic pain of knees.  2. Patellofemoral pain bilateral    - Given acute nature of pain, start medrol gem  - Provided HEP for patellofemoral pain  - Recommend arch support with all footwear  - Consideration for MRI if no improvement R>L  -Follow up mychart 2 weeks if persisting    It was a pleasure seeing Jorge today.    Walt Melissa DO, CAQSM  Primary Care Sports Medicine

## 2022-06-15 NOTE — LETTER
"    6/15/2022         RE: Jorge Madrigal  [address] [facility] [address]        Dear Colleague,    Thank you for referring your patient, Jorge Madrigal, to the [facility] Cost. Please see a copy of my visit note below.    CHIEF COMPLAINT:  Pain of the Right Knee and Pain of the Left Knee       HISTORY OF PRESENT ILLNESS  Mr. Madrigal is a pleasant 37 year old year old male who presents to clinic today with bilateral knee pain.  Jorge explains that his pain began in 2019 when he jumped off a high platform and landed in a deep squat. Has had intermittent pain since then that worsened in the past month. Works as occupational therapist    Onset: sudden, worsening  Location: bilateral knee  Quality:  Constant aching and dull pain, throbbing at night  Duration: 3 years   Timing:constant  Modifying factors:  resting and non-use makes it better, movement and use makes it worse  Associated signs & symptoms: pain, \"pop\" with valgus movement that causes improvement in pain  Previous similar pain: No   Treatments to date: ice, OTCs, arch supports.    Additional history: as documented    Review of Systems: A 10-point review of systems was obtained and is negative except for as noted in the HPI.       MEDICAL HISTORY  Patient Active Problem List   Diagnosis     Moderate persistent asthma without complication     GERD (gastroesophageal reflux disease)     Recurrent major depressive disorder, in partial remission (H)     Screening examination for pulmonary tuberculosis     Morbid obesity (H)     Alcohol use disorder, mild, in sustained remission       Current Outpatient Medications   Medication Sig Dispense Refill     methylPREDNISolone (MEDROL DOSEPAK) 4 MG tablet therapy pack Follow Package Directions 21 tablet 0     acetaminophen (TYLENOL) 325 MG tablet Take 325-650 mg by mouth       albuterol (ACCUNEB) 0.63 MG/3ML neb solution Take 3 mLs (0.63 mg) by nebulization every 4 hours as needed " for shortness of breath / dyspnea or wheezing 3 mL 3     cetirizine (ZYRTEC) 10 MG tablet Take 10 mg by mouth daily       fluticasone-salmeterol (ADVAIR) 250-50 MCG/DOSE inhaler Inhale 1 puff into the lungs 2 times daily 60 each 11     ibuprofen (ADVIL/MOTRIN) 200 MG tablet Take 200-400 mg by mouth       metroNIDAZOLE (METROGEL) 0.75 % external gel Apply to face BID (Patient not taking: Reported on 6/6/2022) 45 g 11     montelukast (SINGULAIR) 10 MG tablet Take 1 tablet (10 mg) by mouth At Bedtime 90 tablet 1     omeprazole (PRILOSEC) 20 MG DR capsule Take 1 capsule (20 mg) by mouth daily       sertraline (ZOLOFT) 100 MG tablet Take 1 tablet (100 mg) by mouth daily 90 tablet 3     VENTOLIN  (90 Base) MCG/ACT inhaler INHALE 2 PUFFS INTO THE LUNGS EVERY 4 HOURS AS NEEDED FOR SHORTNESS OF BREATH OR DIFFICULT BREATHING OR WHEEZING 18 g 11       No Known Allergies    Family History   Problem Relation Age of Onset     Colon Cancer Mother 60     Substance Abuse Mother      Mental Illness Mother      Substance Abuse Father         alcohol     Mental Illness Father      Substance Abuse Maternal Grandfather      Substance Abuse Paternal Grandfather      Suicide No family hx of        Additional medical/Social/Surgical histories reviewed in [employer] and updated as appropriate.       PHYSICAL EXAM  BP (!) 140/88   Ht 1.829 m (6')   Wt 131.1 kg (289 lb)   BMI 39.20 kg/m      General  - normal appearance, in no obvious distress  Musculoskeletal - bilateral knees  - stance: normal gait without limp, painful squat bilateral knees  - inspection: no swelling or effusion, normal muscle tone, normal bone and joint alignment, no obvious deformity +pes planus bilaterally with hindfoot valgus.  - palpation: no joint line tenderness, patellar tendon non-tender, tender medial patellar facet  - ROM: 135 degrees flexion, -5 degrees extension, not painful, crepitus with weight-bearing flexion  - strength: 5/5 in flexion, 5/5 in  extension  - special tests:  (-) Lachman  (-) anterior drawer  (-) Pennie  (-) Thessaly  (-) varus at 0 and 30 degrees flexion  (-) valgus at 0 and 30 degrees flexion  (+) patellar compression  (-) patellar apprehension  Neuro  - no sensory or motor deficit, grossly normal coordination, normal muscle tone  Skin  - no ecchymosis, erythema, warmth, or induration, no obvious rash  Psych  - interactive, appropriate, normal mood and affect    IMAGING : XR knee right 3V. Final results and radiologist's interpretation, available in the [facility] health record. Images were reviewed with the patient/family members in the office today. My personal interpretation of the performed imaging is no acute osseous abnormality or significant degenerative changes of joint.       ASSESSMENT & PLAN  Mr. Madrigal is a 37 year old year old male who presents to clinic today with right > left anterior knee pain increasing over the past month. Exam with exquisite tenderness medial patellar facet, exacerbated with squat. Concern at this time for underlying chondromalacia despite normal xrays.    Diagnosis:   1. Acute pain of bilateral knees  2. Patellofemoral pain bilateral    - Given acute nature of pain, start medrol gem  - Provided HEP for patellofemoral pain  - Recommend arch support with all footwear  - Consideration for MRI if no improvement R>L  -Follow up mychart 2 weeks if persisting    It was a pleasure seeing Jorge today.    Walt Melissa DO, [facility]  Primary Care Sports Medicine      Again, thank you for allowing me to participate in the care of your patient.        Sincerely,        Walt Melissa DO

## 2022-06-21 DIAGNOSIS — J45.20 MILD INTERMITTENT ASTHMA, UNSPECIFIED WHETHER COMPLICATED: ICD-10-CM

## 2022-06-23 RX ORDER — MONTELUKAST SODIUM 10 MG/1
TABLET ORAL
Qty: 90 TABLET | Refills: 1 | Status: SHIPPED | OUTPATIENT
Start: 2022-06-23 | End: 2023-06-30

## 2022-06-23 NOTE — TELEPHONE ENCOUNTER
Routing refill request to provider for review/approval because:  Pt hasn't been seen since 11/2021 . FLAKITO Byrnes NP last filled prescription. Routing to provider to review and advise.

## 2022-06-23 NOTE — TELEPHONE ENCOUNTER
Per chart review, patient sees providers at [facility].    Routing to that clinic's refill pool.    REDDY BernalN, RN  [facility]

## 2022-08-03 ENCOUNTER — OFFICE VISIT (OUTPATIENT)
Dept: FAMILY MEDICINE | Facility: CLINIC | Age: 37
End: 2022-08-03
Payer: COMMERCIAL

## 2022-08-03 DIAGNOSIS — R61 HYPERHIDROSIS: Primary | ICD-10-CM

## 2022-08-03 PROCEDURE — 99214 OFFICE O/P EST MOD 30 MIN: CPT | Performed by: NURSE PRACTITIONER

## 2022-08-03 ASSESSMENT — PAIN SCALES - GENERAL: PAINLEVEL: NO PAIN (0)

## 2022-08-03 NOTE — PROGRESS NOTES
[facility] Dermatology Note  Encounter Date: Aug 3, 2022  Office Visit     Reviewed patients past medical history and pertinent chart review prior to patients visit today.     Dermatology Problem List:  Hyperhidrosis of scalp  Rosacea, on metronidazole gel    ____________________________________________    Assessment & Plan:     #Hyperhidrosis of scalp.  We discussed options for decreasing sweating including topical Drysol, oral Robinul, Botox injections.  Patient is willing to try the topical Drysol/aluminum chloride.  He will apply nightly to the areas affected, okay to wash off in the morning if necessary.  Discussed that this can be quite irritating to the skin and he may want to start every third night and slowly increase frequency so that he is not getting dry and Rashy.  He will give this a try for the next few months and will follow-up in 3 months if not well controlled.  We may switch therapies at that point.  I did discuss with patient that I personally do not inject Botox to the scalp but we have other prior providers that do.  It will also depend on if insurance will cover at this as a treatment.    Pilar Graf, FLAKITO CNP on 8/3/2022 at 3:40 PM   _______________________________________    CC: Derm Problem (Hyperhydrosis on scalp)    HPI:  Mr. Jorge Madrigal is a(n) 37 year old male who presents today as a new patient for excessive sweating of the scalp.  He says this is been going on for several years but he did not know there were treatments for it.  He saw a pamphlet recently and is now curious if there is something he could be doing to control the sweating.  He does sweat other places as well but the forehead and scalp are what bother him the most.  He sweats at rest and it is quite embarrassing.    Patient is otherwise feeling well, without additional skin concerns.      Physical Exam:  SKIN: Focused examination of scalp and face was performed.  -Patient is actively  dripping sweat from the forehead, temples, and frontal scalp    - No other lesions of concern on areas examined.     Medications:  Current Outpatient Medications   Medication     metroNIDAZOLE (METROGEL) 0.75 % external gel     acetaminophen (TYLENOL) 325 MG tablet     albuterol (ACCUNEB) 0.63 MG/3ML neb solution     cetirizine (ZYRTEC) 10 MG tablet     fluticasone-salmeterol (ADVAIR) 250-50 MCG/DOSE inhaler     ibuprofen (ADVIL/MOTRIN) 200 MG tablet     methylPREDNISolone (MEDROL DOSEPAK) 4 MG tablet therapy pack     montelukast (SINGULAIR) 10 MG tablet     omeprazole (PRILOSEC) 20 MG DR capsule     sertraline (ZOLOFT) 100 MG tablet     VENTOLIN  (90 Base) MCG/ACT inhaler     No current facility-administered medications for this visit.      Past Medical History:   Patient Active Problem List   Diagnosis     Moderate persistent asthma without complication     GERD (gastroesophageal reflux disease)     Recurrent major depressive disorder, in partial remission (H)     Screening examination for pulmonary tuberculosis     Morbid obesity (H)     Alcohol use disorder, mild, in sustained remission     Past Medical History:   Diagnosis Date     Alcohol dependence in remission (H)      Asthma      Depressive disorder      GERD (gastroesophageal reflux disease)        CC No referring provider defined for this encounter. on close of this encounter.

## 2022-08-03 NOTE — LETTER
8/3/2022         RE: Jorge Madrigal  [address] [facility] [address]        Dear Colleague,    Thank you for referring your patient, Jorge Madrigal, to the [facility]. Please see a copy of my visit note below.    [facility] Dermatology Note  Encounter Date: Aug 3, 2022  Office Visit     Reviewed patients past medical history and pertinent chart review prior to patients visit today.     Dermatology Problem List:  Hyperhidrosis of scalp  Rosacea, on metronidazole gel    ____________________________________________    Assessment & Plan:     #Hyperhidrosis of scalp.  We discussed options for decreasing sweating including topical Drysol, oral Robinul, Botox injections.  Patient is willing to try the topical Drysol/aluminum chloride.  He will apply nightly to the areas affected, okay to wash off in the morning if necessary.  Discussed that this can be quite irritating to the skin and he may want to start every third night and slowly increase frequency so that he is not getting dry and Rashy.  He will give this a try for the next few months and will follow-up in 3 months if not well controlled.  We may switch therapies at that point.  I did discuss with patient that I personally do not inject Botox to the scalp but we have other prior providers that do.  It will also depend on if insurance will cover at this as a treatment.    Pilar Graf, APRN CNP on 8/3/2022 at 3:40 PM   _______________________________________    CC: Derm Problem (Hyperhydrosis on scalp)    HPI:  Mr. Jorge Madrigal is a(n) 37 year old male who presents today as a new patient for excessive sweating of the scalp.  He says this is been going on for several years but he did not know there were treatments for it.  He saw a pamphlet recently and is now curious if there is something he could be doing to control the sweating.  He does sweat other places as well but the forehead and scalp  are what bother him the most.  He sweats at rest and it is quite embarrassing.    Patient is otherwise feeling well, without additional skin concerns.      Physical Exam:  SKIN: Focused examination of scalp and face was performed.  -Patient is actively dripping sweat from the forehead, temples, and frontal scalp    - No other lesions of concern on areas examined.     Medications:  Current Outpatient Medications   Medication     metroNIDAZOLE (METROGEL) 0.75 % external gel     acetaminophen (TYLENOL) 325 MG tablet     albuterol (ACCUNEB) 0.63 MG/3ML neb solution     cetirizine (ZYRTEC) 10 MG tablet     fluticasone-salmeterol (ADVAIR) 250-50 MCG/DOSE inhaler     ibuprofen (ADVIL/MOTRIN) 200 MG tablet     methylPREDNISolone (MEDROL DOSEPAK) 4 MG tablet therapy pack     montelukast (SINGULAIR) 10 MG tablet     omeprazole (PRILOSEC) 20 MG DR capsule     sertraline (ZOLOFT) 100 MG tablet     VENTOLIN  (90 Base) MCG/ACT inhaler     No current facility-administered medications for this visit.      Past Medical History:   Patient Active Problem List   Diagnosis     Moderate persistent asthma without complication     GERD (gastroesophageal reflux disease)     Recurrent major depressive disorder, in partial remission (H)     Screening examination for pulmonary tuberculosis     Morbid obesity (H)     Alcohol use disorder, mild, in sustained remission     Past Medical History:   Diagnosis Date     Alcohol dependence in remission (H)      Asthma      Depressive disorder      GERD (gastroesophageal reflux disease)        CC No referring provider defined for this encounter. on close of this encounter.       Again, thank you for allowing me to participate in the care of your patient.        Sincerely,        FLAKITO Ceballos CNP

## 2022-08-11 ENCOUNTER — OFFICE VISIT (OUTPATIENT)
Dept: ORTHOPEDICS | Facility: CLINIC | Age: 37
End: 2022-08-11
Payer: COMMERCIAL

## 2022-08-11 VITALS
BODY MASS INDEX: 38.94 KG/M2 | SYSTOLIC BLOOD PRESSURE: 150 MMHG | HEIGHT: 72 IN | WEIGHT: 287.5 LBS | DIASTOLIC BLOOD PRESSURE: 90 MMHG

## 2022-08-11 DIAGNOSIS — M22.2X2 PATELLOFEMORAL PAIN SYNDROME OF BOTH KNEES: Primary | ICD-10-CM

## 2022-08-11 DIAGNOSIS — M22.2X1 PATELLOFEMORAL PAIN SYNDROME OF BOTH KNEES: Primary | ICD-10-CM

## 2022-08-11 PROCEDURE — 99213 OFFICE O/P EST LOW 20 MIN: CPT | Mod: 25 | Performed by: FAMILY MEDICINE

## 2022-08-11 PROCEDURE — 20610 DRAIN/INJ JOINT/BURSA W/O US: CPT | Mod: 50 | Performed by: FAMILY MEDICINE

## 2022-08-11 RX ADMIN — TRIAMCINOLONE ACETONIDE 40 MG: 40 INJECTION, SUSPENSION INTRA-ARTICULAR; INTRAMUSCULAR at 17:24

## 2022-08-11 RX ADMIN — LIDOCAINE HYDROCHLORIDE 4 ML: 10 INJECTION, SOLUTION INFILTRATION; PERINEURAL at 17:24

## 2022-08-11 NOTE — PATIENT INSTRUCTIONS
Thank you for choosing [facility]    DR [facility]INE[facility] CLINIC LOCATIONS  [place] [address] [address] [facility], [address]th [place]   [place], [place], [address] [place] [address]   [phone]       APPOINTMENTS: [phone]    CARE QUESTIONS: [phone],    BILLING QUESTIONS: [phone]    FAX NUMBER: [phone]        Follow up: as needed    Physical Therapy orders have been placed. Please contact your location of choice to schedule.     Brace Recommendation: Iveth Maciel by Chito, or similar brace

## 2022-08-11 NOTE — PROGRESS NOTES
ESTABLISHED PATIENT FOLLOW-UP:  Pain and Follow Up of the Left Knee and Pain and Follow Up of the Right Knee     HISTORY OF PRESENT ILLNESS  Mr. Madrigal is a pleasant 37 year old year old male who presents to clinic today for follow-up of bilateral knee pain.     Date of injury:  Ongoing 3+ years  Date last seen: 6/15/22  Following Therapeutic Plan: Yes- HEP, OTCs, ice  Pain: constant burning and aching pain, worsens going down stairs  Function: instability going down stairs  Interval History: He took prescribed Medrol Dosepak after last visit and did HEP. After these he had significant relief in symptoms until pain returned 1 month ago with no new injury. He notes he tried patellar taping which provided some relief.     Additional medical/Social/Surgical histories reviewed in [employer] and updated as appropriate.    REVIEW OF SYSTEMS (8/11/2022)  CONSTITUTIONAL: Denies fever and weight loss  GASTROINTESTINAL: Denies abdominal pain, nausea, vomiting  MUSCULOSKELETAL: See HPI  SKIN: Denies any recent rash or lesion  NEUROLOGICAL: Denies numbness or focal weakness     PHYSICAL EXAM  BP (!) 150/90   Ht 1.829 m (6')   Wt 130.4 kg (287 lb 8 oz)   BMI 38.99 kg/m      General  - normal appearance, in no obvious distress  HEENT  - conjunctivae not injected, moist mucous membranes  CV  - normal popliteal pulse  Pulm  - normal respiratory pattern, non-labored  Musculoskeletal - bilateral knee  - stance: normal gait without limp  - inspection: no swelling or effusion, normal muscle tone, normal bone and joint alignment, no obvious deformity  - palpation: no joint line tenderness, patellar tendon non-tender, very tender medial patellar facets  - ROM: 135 degrees flexion, -5 degrees extension, not painful, crepitus with weight-bearing flexion  - strength: 5/5 in flexion, 5/5 in extension  - special tests:  (-) Lachman  (-) anterior drawer  (-) Pennie  (-) Thessaly  (-) varus at 0 and 30 degrees flexion  (-) valgus at 0 and 30  degrees flexion  (+) patellar compression  (+) patellar grind  (-) patellar apprehension  Neuro  - no sensory or motor deficit, grossly normal coordination, normal muscle tone  Skin  - no ecchymosis, erythema, warmth, or induration, no obvious rash  Psych  - interactive, appropriate, normal mood and affect     ASSESSMENT & PLAN  Mr. Madrigal is a 37 year old year old male who presents to clinic today with Pain and Follow Up of the Left Knee and Pain and Follow Up of the Right Knee    Diagnosis:  Chronic anterior knee pain bilaterally    Additional diagnostic and treatment options discussed for recalcitrant anterior knee pain with deep flexion and stairs.  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     I did offer him an MRI of his right knee especially, but he would like to hold off.  We discussed diagnostic/therapeutic measures including attempting corticosteroid injection to his knees.  He wished to proceed with this, see procedure note below.    If injection provides substantial relief, I would like him to continue with strengthening and I did place a PT order for him so that he can have formal evaluation and treatment beyond HEP.  If is not successful, an MRI would then be warranted prior to any additional injections or other treatment.    PROCEDURE    Bilateral Knee Injection - Intraarticular  The patient was informed of the risks and the benefits of the procedure and a written consent was signed.  The patient s left knee was prepped with chlorhexidine in sterile fashion.   40 mg of triamcinolone suspension was drawn up into a 5 mL syringe with 4 mL of 1% lidocaine.  Injection was performed using substerile technique.  A 1.5-inch 22-gauge needle was used to enter the anterolateral aspect of the left knee.  Injection performed successfully without difficulty.  There were no complications. The patient tolerated the procedure well. There was negligible bleeding.    This procedure as above was repeated for the right knee.  The patient s right knee was prepped with chlorhexidine in sterile fashion. Injection was performed using substerile technique.  A 1.5-inch 22-gauge needle was used to enter the anterolateral aspect of the right knee.  Injection performed successfully without difficulty.  There were no complications. The patient tolerated the procedure well. There was negligible bleeding.   The patient was instructed to ice the knee upon leaving clinic and refrain from overuse over the next 3 days.   The patient was instructed to call or go to the emergency room with any unusual pain, swelling, redness, or if otherwise concerned.  A follow up appointment will be scheduled to evaluate response to the injection, and to assess range of motion and pain.    PROCEDURE    Bilateral Knee Injection - Intraarticular  The patient was informed of the risks and the benefits of the procedure and a written consent was signed.  The patient s left knee was prepped with chlorhexidine in sterile fashion.   40 mg of triamcinolone suspension was drawn up into a 5 mL syringe with 4 mL of 1% lidocaine.  Injection was performed using substerile technique.  A 1.5-inch 22-gauge needle was used to enter the anterolateral aspect of the left knee.  Injection performed successfully without difficulty.  There were no complications. The patient tolerated the procedure well. There was negligible bleeding.   This procedure as above was repeated for the right knee.  The patient s right knee was prepped with chlorhexidine in sterile fashion. Injection was performed using substerile technique.  A 1.5-inch 22-gauge needle was used to enter the anterolateral aspect of the right knee.  Injection performed successfully without difficulty.  There were no complications. The patient tolerated the procedure well. There was negligible bleeding.   The patient was instructed to ice the knee upon leaving clinic and  refrain from overuse over the next 3 days.   The patient was instructed to call or go to the emergency room with any unusual pain, swelling, redness, or if otherwise concerned.  A follow up appointment will be scheduled to evaluate response to the injection, and to assess range of motion and pain.    Large Joint Injection/Arthocentesis: bilateral knee    Date/Time: 8/11/2022 5:24 PM  Performed by: Walt Melissa DO  Authorized by: Walt Melissa DO     Indications:  Pain  Needle Size:  22 G  Guidance: landmark guided    Approach:  Anterolateral  Location:  Knee  Laterality:  Bilateral      Medications (Right):  40 mg triamcinolone 40 MG/ML; 4 mL lidocaine 1 %  Medications (Left):  40 mg triamcinolone 40 MG/ML; 4 mL lidocaine 1 %  Outcome:  Tolerated well, no immediate complications  Procedure discussed: discussed risks, benefits, and alternatives    Consent Given by:  Patient  Timeout: timeout called immediately prior to procedure    Prep: patient was prepped and draped in usual sterile fashion        It was a pleasure seeing Lowell Melissa DO, [facility]  Primary Care Sports Medicine

## 2022-08-11 NOTE — LETTER
8/11/2022         RE: Jorge Madrigal  [address] [facility] [address]        Dear Colleague,    Thank you for referring your patient, Jorge Madrigal, to the [facility] [place]. Please see a copy of my visit note below.    ESTABLISHED PATIENT FOLLOW-UP:  Pain and Follow Up of the Left Knee and Pain and Follow Up of the Right Knee     HISTORY OF PRESENT ILLNESS  Mr. Madrigal is a pleasant 37 year old year old male who presents to clinic today for follow-up of bilateral knee pain.     Date of injury:  Ongoing 3+ years  Date last seen: 6/15/22  Following Therapeutic Plan: Yes- HEP, OTCs, ice  Pain: constant burning and aching pain, worsens going down stairs  Function: instability going down stairs  Interval History: He took prescribed Medrol Dosepak after last visit and did HEP. After these he had significant relief in symptoms until pain returned 1 month ago with no new injury. He notes he tried patellar taping which provided some relief.     Additional medical/Social/Surgical histories reviewed in [facility] and updated as appropriate.    REVIEW OF SYSTEMS (8/11/2022)  CONSTITUTIONAL: Denies fever and weight loss  GASTROINTESTINAL: Denies abdominal pain, nausea, vomiting  MUSCULOSKELETAL: See HPI  SKIN: Denies any recent rash or lesion  NEUROLOGICAL: Denies numbness or focal weakness     PHYSICAL EXAM  BP (!) 150/90   Ht 1.829 m (6')   Wt 130.4 kg (287 lb 8 oz)   BMI 38.99 kg/m      General  - normal appearance, in no obvious distress  HEENT  - conjunctivae not injected, moist mucous membranes  CV  - normal popliteal pulse  Pulm  - normal respiratory pattern, non-labored  Musculoskeletal - bilateral knee  - stance: normal gait without limp  - inspection: no swelling or effusion, normal muscle tone, normal bone and joint alignment, no obvious deformity  - palpation: no joint line tenderness, patellar tendon non-tender, very tender medial patellar facets  - ROM: 135 degrees  flexion, -5 degrees extension, not painful, crepitus with weight-bearing flexion  - strength: 5/5 in flexion, 5/5 in extension  - special tests:  (-) Lachman  (-) anterior drawer  (-) Pennie  (-) Thessaly  (-) varus at 0 and 30 degrees flexion  (-) valgus at 0 and 30 degrees flexion  (+) patellar compression  (+) patellar grind  (-) patellar apprehension  Neuro  - no sensory or motor deficit, grossly normal coordination, normal muscle tone  Skin  - no ecchymosis, erythema, warmth, or induration, no obvious rash  Psych  - interactive, appropriate, normal mood and affect     ASSESSMENT & PLAN  Mr. Madrigal is a 37 year old year old male who presents to clinic today with Pain and Follow Up of the Left Knee and Pain and Follow Up of the Right Knee    Diagnosis:  Chronic anterior knee pain bilaterally    Additional diagnostic and treatment options discussed for recalcitrant anterior knee pain with deep flexion and stairs.  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     I did offer him an MRI of his right knee especially, but he would like to hold off.  We discussed diagnostic/therapeutic measures including attempting corticosteroid injection to his knees.  He wished to proceed with this, see procedure note below.    If injection provides substantial relief, I would like him to continue with strengthening and I did place a PT order for him so that he can have formal evaluation and treatment beyond HEP.  If is not successful, an MRI would then be warranted prior to any additional injections or other treatment.    PROCEDURE    Bilateral Knee Injection - Intraarticular  The patient was informed of the risks and the benefits of the procedure and a written consent was signed.  The patient s left knee was prepped with chlorhexidine in sterile fashion.   40 mg of triamcinolone suspension was drawn up into a 5 mL syringe with 4 mL of 1% lidocaine.  Injection was performed using  substerile technique.  A 1.5-inch 22-gauge needle was used to enter the anterolateral aspect of the left knee.  Injection performed successfully without difficulty.  There were no complications. The patient tolerated the procedure well. There was negligible bleeding.   This procedure as above was repeated for the right knee.  The patient s right knee was prepped with chlorhexidine in sterile fashion. Injection was performed using substerile technique.  A 1.5-inch 22-gauge needle was used to enter the anterolateral aspect of the right knee.  Injection performed successfully without difficulty.  There were no complications. The patient tolerated the procedure well. There was negligible bleeding.   The patient was instructed to ice the knee upon leaving clinic and refrain from overuse over the next 3 days.   The patient was instructed to call or go to the emergency room with any unusual pain, swelling, redness, or if otherwise concerned.  A follow up appointment will be scheduled to evaluate response to the injection, and to assess range of motion and pain.    PROCEDURE    Bilateral Knee Injection - Intraarticular  The patient was informed of the risks and the benefits of the procedure and a written consent was signed.  The patient s left knee was prepped with chlorhexidine in sterile fashion.   40 mg of triamcinolone suspension was drawn up into a 5 mL syringe with 4 mL of 1% lidocaine.  Injection was performed using substerile technique.  A 1.5-inch 22-gauge needle was used to enter the anterolateral aspect of the left knee.  Injection performed successfully without difficulty.  There were no complications. The patient tolerated the procedure well. There was negligible bleeding.   This procedure as above was repeated for the right knee.  The patient s right knee was prepped with chlorhexidine in sterile fashion. Injection was performed using substerile technique.  A 1.5-inch 22-gauge needle was used to enter the  anterolateral aspect of the right knee.  Injection performed successfully without difficulty.  There were no complications. The patient tolerated the procedure well. There was negligible bleeding.   The patient was instructed to ice the knee upon leaving clinic and refrain from overuse over the next 3 days.   The patient was instructed to call or go to the emergency room with any unusual pain, swelling, redness, or if otherwise concerned.  A follow up appointment will be scheduled to evaluate response to the injection, and to assess range of motion and pain.    Large Joint Injection/Arthocentesis: bilateral knee    Date/Time: 8/11/2022 5:24 PM  Performed by: Walt Melissa DO  Authorized by: Walt Melissa DO     Indications:  Pain  Needle Size:  22 G  Guidance: landmark guided    Approach:  Anterolateral  Location:  Knee  Laterality:  Bilateral      Medications (Right):  40 mg triamcinolone 40 MG/ML; 4 mL lidocaine 1 %  Medications (Left):  40 mg triamcinolone 40 MG/ML; 4 mL lidocaine 1 %  Outcome:  Tolerated well, no immediate complications  Procedure discussed: discussed risks, benefits, and alternatives    Consent Given by:  Patient  Timeout: timeout called immediately prior to procedure    Prep: patient was prepped and draped in usual sterile fashion        It was a pleasure seeing Lowell Melissa DO, [facility]  Primary Care Sports Medicine          Again, thank you for allowing me to participate in the care of your patient.        Sincerely,        Walt Melissa DO

## 2022-08-12 RX ORDER — LIDOCAINE HYDROCHLORIDE 10 MG/ML
4 INJECTION, SOLUTION INFILTRATION; PERINEURAL
Status: DISCONTINUED | OUTPATIENT
Start: 2022-08-11 | End: 2023-02-16

## 2022-08-12 RX ORDER — TRIAMCINOLONE ACETONIDE 40 MG/ML
40 INJECTION, SUSPENSION INTRA-ARTICULAR; INTRAMUSCULAR
Status: DISCONTINUED | OUTPATIENT
Start: 2022-08-11 | End: 2023-02-16

## 2022-09-18 ENCOUNTER — HEALTH MAINTENANCE LETTER (OUTPATIENT)
Age: 37
End: 2022-09-18

## 2022-10-20 DIAGNOSIS — F33.42 RECURRENT MAJOR DEPRESSIVE DISORDER, IN FULL REMISSION (H): ICD-10-CM

## 2022-10-24 RX ORDER — SERTRALINE HYDROCHLORIDE 100 MG/1
100 TABLET, FILM COATED ORAL DAILY
Qty: 90 TABLET | Refills: 0 | Status: SHIPPED | OUTPATIENT
Start: 2022-10-24 | End: 2023-03-03

## 2022-10-24 NOTE — TELEPHONE ENCOUNTER
Routing refill request to provider for review/approval because:  PHQ-9 score out of date   PHQ-9 score:    PHQ 11/11/2021   PHQ-9 Total Score 0   Q9: Thoughts of better off dead/self-harm past 2 weeks Not at all     Pt is due for a visit with provider. Appt is scheduled for 12/30/22 with Dr. Douglas.

## 2022-11-03 ENCOUNTER — VIRTUAL VISIT (OUTPATIENT)
Dept: FAMILY MEDICINE | Facility: CLINIC | Age: 37
End: 2022-11-03
Payer: COMMERCIAL

## 2022-11-03 ENCOUNTER — LAB (OUTPATIENT)
Dept: URGENT CARE | Facility: URGENT CARE | Age: 37
End: 2022-11-03
Attending: INTERNAL MEDICINE
Payer: COMMERCIAL

## 2022-11-03 ENCOUNTER — NURSE TRIAGE (OUTPATIENT)
Dept: INTERNAL MEDICINE | Facility: CLINIC | Age: 37
End: 2022-11-03

## 2022-11-03 DIAGNOSIS — F33.42 RECURRENT MAJOR DEPRESSIVE DISORDER, IN FULL REMISSION (H): ICD-10-CM

## 2022-11-03 DIAGNOSIS — J02.9 SORE THROAT: ICD-10-CM

## 2022-11-03 DIAGNOSIS — E66.01 MORBID OBESITY (H): ICD-10-CM

## 2022-11-03 DIAGNOSIS — U07.1 INFECTION DUE TO 2019 NOVEL CORONAVIRUS: Primary | ICD-10-CM

## 2022-11-03 DIAGNOSIS — R50.9 FEVER, UNSPECIFIED FEVER CAUSE: ICD-10-CM

## 2022-11-03 DIAGNOSIS — F10.21 ALCOHOL DEPENDENCE IN REMISSION (H): ICD-10-CM

## 2022-11-03 DIAGNOSIS — R06.02 SHORTNESS OF BREATH: ICD-10-CM

## 2022-11-03 DIAGNOSIS — R05.1 ACUTE COUGH: ICD-10-CM

## 2022-11-03 LAB
DEPRECATED S PYO AG THROAT QL EIA: NEGATIVE
GROUP A STREP BY PCR: NOT DETECTED

## 2022-11-03 PROCEDURE — 87651 STREP A DNA AMP PROBE: CPT | Performed by: INTERNAL MEDICINE

## 2022-11-03 PROCEDURE — 99214 OFFICE O/P EST MOD 30 MIN: CPT | Mod: CS | Performed by: INTERNAL MEDICINE

## 2022-11-03 RX ORDER — BENZONATATE 200 MG/1
200 CAPSULE ORAL 3 TIMES DAILY PRN
Qty: 30 CAPSULE | Refills: 0 | Status: SHIPPED | OUTPATIENT
Start: 2022-11-03 | End: 2023-02-16

## 2022-11-03 NOTE — TELEPHONE ENCOUNTER
Patient has virtual visit with provider today for positive Covid. He has asthma and is  concerned with positive home Covid test.     The rest of the family tested positive for Covid and son has positive strep throat.     He will follow care advice including quarantine  guidelines.     Reason for Disposition    [1] Continuous (nonstop) coughing interferes with work or school AND [2] no improvement using cough treatment per Care Advice    COVID-19 Home Isolation, questions about    Additional Information    Negative: SEVERE difficulty breathing (e.g., struggling for each breath, speaks in single words)    Negative: Difficult to awaken or acting confused (e.g., disoriented, slurred speech)    Negative: Bluish (or gray) lips or face now    Negative: Shock suspected (e.g., cold/pale/clammy skin, too weak to stand, low BP, rapid pulse)    Negative: Sounds like a life-threatening emergency to the triager    Negative: [1] Diagnosed or suspected COVID-19 AND [2] symptoms lasting 3 or more weeks    Negative: [1] COVID-19 exposure AND [2] no symptoms    Negative: COVID-19 vaccine reaction suspected (e.g., fever, headache, muscle aches) occurring 1 to 3 days after getting vaccine    Negative: COVID-19 vaccine, questions about    Negative: [1] Lives with someone known to have influenza (flu test positive) AND [2] flu-like symptoms (e.g., cough, runny nose, sore throat, SOB; with or without fever)    Negative: [1] Adult with possible COVID-19 symptoms AND [2] triager concerned about severity of symptoms or other causes    Negative: COVID-19 and breastfeeding, questions about    Negative: SEVERE or constant chest pain or pressure  (Exception: Mild central chest pain, present only when coughing.)    Negative: MODERATE difficulty breathing (e.g., speaks in phrases, SOB even at rest, pulse 100-120)    Negative: [1] Headache AND [2] stiff neck (can't touch chin to chest)    Negative: Oxygen level (e.g., pulse oximetry) 90 percent or  "lower    Negative: Chest pain or pressure    Negative: Patient sounds very sick or weak to the triager    Negative: MILD difficulty breathing (e.g., minimal/no SOB at rest, SOB with walking, pulse <100)    Negative: Fever > 103 F (39.4 C)    Negative: [1] Fever > 101 F (38.3 C) AND [2] age > 60 years    Negative: [1] Fever > 100.0 F (37.8 C) AND [2] bedridden (e.g., nursing home patient, CVA, chronic illness, recovering from surgery)    Negative: HIGH RISK for severe COVID complications (e.g., weak immune system, age > 64 years, obesity with BMI > 25, pregnant, chronic lung disease or other chronic medical condition)  (Exception: Already seen by PCP and no new or worsening symptoms.)    Negative: [1] HIGH RISK patient AND [2] influenza is widespread in the community AND [3] ONE OR MORE respiratory symptoms: cough, sore throat, runny or stuffy nose    Negative: [1] HIGH RISK patient AND [2] influenza exposure within the last 7 days AND [3] ONE OR MORE respiratory symptoms: cough, sore throat, runny or stuffy nose    Negative: Oxygen level (e.g., pulse oximetry) 91 to 94 percent    Negative: Fever present > 3 days (72 hours)    Negative: [1] Fever returns after gone for over 24 hours AND [2] symptoms worse or not improved    Answer Assessment - Initial Assessment Questions  1. COVID-19 DIAGNOSIS: \"Who made your COVID-19 diagnosis?\" \"Was it confirmed by a positive lab test or self-test?\" If not diagnosed by a doctor (or NP/PA), ask \"Are there lots of cases (community spread) where you live?\" Note: See public health department website, if unsure.      Home test was positive for Covid this am.   2. COVID-19 EXPOSURE: \"Was there any known exposure to COVID before the symptoms began?\" CDC Definition of close contact: within 6 feet (2 meters) for a total of 15 minutes or more over a 24-hour period.       All of the family is positive for Covid.   3. ONSET: \"When did the COVID-19 symptoms start?\"       Yesterday.   4. WORST " "SYMPTOM: \"What is your worst symptom?\" (e.g., cough, fever, shortness of breath, muscle aches)      Cough is bad , headache.   5. COUGH: \"Do you have a cough?\" If Yes, ask: \"How bad is the cough?\"        Yes  6. FEVER: \"Do you have a fever?\" If Yes, ask: \"What is your temperature, how was it measured, and when did it start?\"      No  7. RESPIRATORY STATUS: \"Describe your breathing?\" (e.g., shortness of breath, wheezing, unable to speak)       I have asthma.   8. BETTER-SAME-WORSE: \"Are you getting better, staying the same or getting worse compared to yesterday?\"  If getting worse, ask, \"In what way?\"      Worst.   9. HIGH RISK DISEASE: \"Do you have any chronic medical problems?\" (e.g., asthma, heart or lung disease, weak immune system, obesity, etc.)      Asthma  10. VACCINE: \"Have you had the COVID-19 vaccine?\" If Yes, ask: \"Which one, how many shots, when did you get it?\"        Yes  11. BOOSTER: \"Have you received your COVID-19 booster?\" If Yes, ask: \"Which one and when did you get it?\"        Yes  12. PREGNANCY: \"Is there any chance you are pregnant?\" \"When was your last menstrual period?\"        N/A  13. OTHER SYMPTOMS: \"Do you have any other symptoms?\"  (e.g., chills, fatigue, headache, loss of smell or taste, muscle pain, sore throat)  Headache, muscle aches.           14. O2 SATURATION MONITOR:  \"Do you use an oxygen saturation monitor (pulse oximeter) at home?\" If Yes, ask \"What is your reading (oxygen level) today?\" \"What is your usual oxygen saturation reading?\" (e.g., 95%)        Not at home.    Protocols used: CORONAVIRUS (COVID-19) DIAGNOSED OR [id number] 1.18.2022    Monica Ramires RN  [facility]     "

## 2022-11-03 NOTE — PATIENT INSTRUCTIONS
"For strep swab scheduling:   Please call [phone] and press \" 7\"  to be transferred to a    OR  Please call [phone] and press \"2\" to be transferred to a         Full discussion with patient regarding medication options/risks/benefits/common side effects/potential drug interactions.  Discussed Paxlovid has significant risk for drug interactions. We reviewed all prescription/OTC/supplements patient is taking and patient was asked to HOLD the following: NONE    Avoid alcohol while on paxlovid (and for three days after last dose) due to increased risk of liver inflammation/jaundice.  Patient confirms no known HIV diagnosis and understands Paxlovid may lead to complications if uncontrolled/undiagnosed HIV.    Please call the pharmacy prior to getting there to ensure they have your medication in stock and so they can review the medication in more detail with you.   Call if any questions/concerns.   If worsening symptoms including but not limited to persistent shortness of breath or chest pain, patient instructed to go to emergency room.     The FDA has authorized the emergency use of certain medications for patients who are at high risk for progression to severe illness or death from COVID 19. These medications are investigational, and therefore, information is limited as they are still being studied.      Important: You can NOT be prescribed Molnupiravir or PAXLOVID if you will start taking the medicine more than 5 days after your symptoms have started.    PAXLOVID: https://www.fda.gov/media/302904/download    PAXLOVID (nimatrelvir/ritonavir)  How it works  Two medicines (nirmatrelvir and ritonavir) are taken together. They stop the virus from growing. Less amount of virus is easier for your body to fight.  Benefits  Lowers risk of hospitalization and death from COVID-19.  How to take  Medicine comes in a daily container with both medicine tablets. Take by mouth twice daily (once in the " morning, once at night) for 5 days.  The number of tablets to take varies by patient.  Don't chew or break capsules. Swallow hole.  When to take  Take as soon as possible after positive COVID-19 test result, and within 5 days of your first symptoms.  Who can take it  Patients must be 18 years or older, weigh at least 88 pounds and have tested positive for COVID-19.  Possible side effects  Can cause altered sense of taste, diarhea (loose, watery stools), high blood pressure, muscle aches.  Medication interactions  Several medicines may interact with PAXLOVID and may cause serious side effects.  Tell your care team about all the medicines you take, including prescription and over-the-counter medicines, vitamins and herbal supplements.  Your provider will review your medicines to make sure that you can safely take PAXLOVID.  Cautions  PAXLOVID is not recommended for patients with severe kidney or liver disease. If you have mild kidney disease, the dose may need to be changed.  If you are pregnant or breastfeeding, talk to your care team about your options.  If you are sexually active, use effective birth control while taking PAXLOVID.

## 2022-11-03 NOTE — PROGRESS NOTES
Jorge is a 37 year old who is being evaluated via a billable telephone visit.      What phone number would you like to be contacted at? [phone]  How would you like to obtain your AVS? MyChart    Assessment & Plan     Infection due to 2019 novel coronavirus  Appropriate counseling was performed given EUA of drug and investigational phase/limited studies.   Full discussion with patient regarding medication options/risks/benefits/common side effects/potential drug interactions.  Discussed Paxlovid has significant risk for drug interactions. We reviewed all prescription/OTC/supplements patient is taking and patient was asked to HOLD the following: none     Avoid alcohol while on paxlovid (and for three days after last dose) due to increased risk of liver inflammation/jaundice.  Patient confirms no known HIV diagnosis and understands Paxlovid may lead to complications if uncontrolled/undiagnosed HIV.    If worsening symptoms including but not limited to persistent shortness of breath or chest pain, patient instructed to go to emergency room.       Acute cough  - benzonatate (TESSALON) 200 MG capsule; Take 1 capsule (200 mg) by mouth 3 times daily as needed for cough    Fever, unspecified fever cause  Take ibuprofen as needed    Shortness of breath  Use inhalers/nebulizers. If saturation < 92% with shortness of breath, seek immediate medical attention.    Recurrent major depressive disorder, in full remission (H)  Did not address this visit    Morbid obesity (H)  Did not address this visit    Alcohol dependence in remission (H)  Did not address this visit    Sore throat  - Streptococcus A Rapid Screen w/Reflex to PCR - Clinic Collect    See Patient Instructions    Return in about 6 months (around 5/3/2023) for Follow up, Routine preventive, with any available provider.    CHRISTEL CASTANO MD  [facility] KONG Patel is a 37 year old, presenting for the following health issues:  No chief  complaint on file.    REGINO Patel had a telephone visit today  His whole family tested positive for COVID this morning.   His 8 month old son tested positive for strep as well.   He has sore throat, cough, shortness of breath which relieves with albuterol nebulizer, chills.  He has asthma.  His symptoms started yesterday.     Review of Systems       Objective       Vitals:  No vitals were obtained today due to virtual visit.    Physical Exam   GEN: No acute distress  RESP: No audible increased work of breathing. Patient speaking in full sentences without distress.  PSYCH: pleasant  Exam otherwise limited due to virtual platform          Phone call duration: 15 minutes

## 2022-12-02 ENCOUNTER — OFFICE VISIT (OUTPATIENT)
Dept: OTOLARYNGOLOGY | Facility: CLINIC | Age: 37
End: 2022-12-02
Payer: COMMERCIAL

## 2022-12-02 DIAGNOSIS — E66.09 CLASS 2 OBESITY DUE TO EXCESS CALORIES WITH BODY MASS INDEX (BMI) OF 38.0 TO 38.9 IN ADULT, UNSPECIFIED WHETHER SERIOUS COMORBIDITY PRESENT: ICD-10-CM

## 2022-12-02 DIAGNOSIS — R09.81 CONGESTION OF PARANASAL SINUS: ICD-10-CM

## 2022-12-02 DIAGNOSIS — E66.812 CLASS 2 OBESITY DUE TO EXCESS CALORIES WITH BODY MASS INDEX (BMI) OF 38.0 TO 38.9 IN ADULT, UNSPECIFIED WHETHER SERIOUS COMORBIDITY PRESENT: ICD-10-CM

## 2022-12-02 DIAGNOSIS — R09.81 NASAL CONGESTION: Primary | ICD-10-CM

## 2022-12-02 DIAGNOSIS — F33.42 RECURRENT MAJOR DEPRESSIVE DISORDER, IN FULL REMISSION (H): ICD-10-CM

## 2022-12-02 DIAGNOSIS — E66.01 MORBID OBESITY (H): ICD-10-CM

## 2022-12-02 DIAGNOSIS — F10.21 ALCOHOL DEPENDENCE IN REMISSION (H): ICD-10-CM

## 2022-12-02 PROCEDURE — 99204 OFFICE O/P NEW MOD 45 MIN: CPT | Performed by: OTOLARYNGOLOGY

## 2022-12-02 RX ORDER — AZELASTINE 1 MG/ML
SPRAY, METERED NASAL
Qty: 30 ML | Refills: 3 | Status: SHIPPED | OUTPATIENT
Start: 2022-12-02 | End: 2023-09-01

## 2022-12-02 NOTE — PROGRESS NOTES
CHIEF COMPLAINT: Patient presents with:  Snoring: Excessive snoring for a year wife is complaining about this has allergies to cats and various trees and grass takes allergy medications          HISTORY OF PRESENT ILLNESS    Jorge was seen at the behest of Kerri Weller PA-C for snoring.  Patient relates loud snoring at night, which is bothering his wife and keeping her from sleeping.   He denies AM headaches or daytime sleepiness.  No witnessed apneas.  Has gained approximately 80 lbs in the past 2-3 years.   He attributes this to depression. He does report nasal congestion at night.            REVIEW OF SYSTEMS    Review of Systems as per HPI and PMHx, otherwise 10 system review system are negative.       ALLERGIES    Patient has no known allergies.    CURRENT MEDICATIONS      Current Outpatient Medications:      acetaminophen (TYLENOL) 325 MG tablet, Take 325-650 mg by mouth, Disp: , Rfl:      albuterol (ACCUNEB) 0.63 MG/3ML neb solution, Take 3 mLs (0.63 mg) by nebulization every 4 hours as needed for shortness of breath / dyspnea or wheezing, Disp: 3 mL, Rfl: 3     azelastine (ASTELIN) 0.1 % nasal spray, 2 sprays each nostril 1-2x daily as needed for nasal congestion (use nightly for first 2 week), Disp: 30 mL, Rfl: 3     cetirizine (ZYRTEC) 10 MG tablet, Take 10 mg by mouth daily, Disp: , Rfl:      fluticasone-salmeterol (ADVAIR) 250-50 MCG/DOSE inhaler, Inhale 1 puff into the lungs 2 times daily, Disp: 60 each, Rfl: 11     ibuprofen (ADVIL/MOTRIN) 200 MG tablet, Take 200-400 mg by mouth, Disp: , Rfl:      montelukast (SINGULAIR) 10 MG tablet, TAKE 1 TABLET(10 MG) BY MOUTH AT BEDTIME, Disp: 90 tablet, Rfl: 1     omeprazole (PRILOSEC) 20 MG DR capsule, Take 1 capsule (20 mg) by mouth daily, Disp: , Rfl:      sertraline (ZOLOFT) 100 MG tablet, Take 1 tablet (100 mg) by mouth daily, Disp: 90 tablet, Rfl: 0     VENTOLIN  (90 Base) MCG/ACT inhaler, INHALE 2 PUFFS INTO THE LUNGS EVERY 4 HOURS AS NEEDED FOR  SHORTNESS OF BREATH OR DIFFICULT BREATHING OR WHEEZING, Disp: 18 g, Rfl: 11     aluminum chloride (DRYSOL) 20 % external solution, Apply topically At Bedtime Start every 2-3 nights first, Disp: 60 mL, Rfl: 11     benzonatate (TESSALON) 200 MG capsule, Take 1 capsule (200 mg) by mouth 3 times daily as needed for cough, Disp: 30 capsule, Rfl: 0     metroNIDAZOLE (METROGEL) 0.75 % external gel, Apply to face BID (Patient not taking: Reported on 12/2/2022), Disp: 45 g, Rfl: 11    Current Facility-Administered Medications:      lidocaine 1 % injection 4 mL, 4 mL, , , Supik, Linwood, DO, 4 mL at 08/11/22 1724     lidocaine 1 % injection 4 mL, 4 mL, , , Supik, Linwood, DO, 4 mL at 08/11/22 1724     triamcinolone (KENALOG-40) injection 40 mg, 40 mg, , , Supik, Linwood, DO, 40 mg at 08/11/22 1724     triamcinolone (KENALOG-40) injection 40 mg, 40 mg, , , Supik, Linwood, DO, 40 mg at 08/11/22 1724     PAST MEDICAL HISTORY    PAST MEDICAL HISTORY:   Past Medical History:   Diagnosis Date     Alcohol dependence in remission (H)      Asthma      Depressive disorder      GERD (gastroesophageal reflux disease)        PAST SURGICAL HISTORY    PAST SURGICAL HISTORY:   Past Surgical History:   Procedure Laterality Date     COLONOSCOPY  2015    Normal       FAMILY  HISTORY    FAMILY HISTORY:   Family History   Problem Relation Age of Onset     Colon Cancer Mother 60     Substance Abuse Mother      Mental Illness Mother      Substance Abuse Father         alcohol     Mental Illness Father      Substance Abuse Maternal Grandfather      Substance Abuse Paternal Grandfather      Suicide No family hx of        SOCIAL HISTORY    SOCIAL HISTORY:   Social History     Tobacco Use     Smoking status: Never     Smokeless tobacco: Never     Tobacco comments:     Occasionally   Substance Use Topics     Alcohol use: Not Currently        PHYSICAL EXAM    HEAD: Normal appearance and symmetry:  No cutaneous lesions.      NECK:  supple     EARS: normal  TM, EACs    EYES:  EOMI    CN VII/XII:  intact     NOSE:     Dorsum:   straight  Septum:  deviated right  Mucosa:  moist        ORAL CAVITY/OROPHARYNX:     Lips:  Normal.  Tongue: normal  Mucosa:   no lesions  Tonsils; 3+ cryptic     NECK:  Trachea:  midline.              Thyroid:  normal              Adenopathy:  none        NEURO:   Alert and Oriented     GAIT AND STATION:  normal     RESPIRATORY:   Symmetry and Respiratory effort     PSYCH:  Normal mood and affect     SKIN:   warm and dry         IMPRESSION:    Encounter Diagnoses   Name Primary?     Nasal congestion Yes     Congestion of paranasal sinus      Class 2 obesity due to excess calories with body mass index (BMI) of 38.0 to 38.9 in adult, unspecified whether serious comorbidity present      Alcohol dependence in remission (H)      Recurrent major depressive disorder, in full remission (H)      Morbid obesity (H)           RECOMMENDATIONS:      Orders Placed This Encounter   Procedures     CT Sinus w/o Contrast     Comprehensive Weight Management      Trial of astelin spray  Return visit 2-3 months follow up

## 2022-12-02 NOTE — PATIENT INSTRUCTIONS
CT sinus ( [facility])  Astelin nasal spray as directed  Weight Loss clinic referral  Return visit 2-3

## 2022-12-02 NOTE — LETTER
12/2/2022         RE: Jorge Madrigal  [address] [facility] [address]        Dear Colleague,    Thank you for referring your patient, Jorge Madrigal, to the [facility]. Please see a copy of my visit note below.    CHIEF COMPLAINT: Patient presents with:  Snoring: Excessive snoring for a year wife is complaining about this has allergies to cats and various trees and grass takes allergy medications          HISTORY OF PRESENT ILLNESS    Jorge was seen at the behest of Kerri Weller PA-C for snoring.  Patient relates loud snoring at night, which is bothering his wife and keeping her from sleeping.   He denies AM headaches or daytime sleepiness.  No witnessed apneas.  Has gained approximately 80 lbs in the past 2-3 years.   He attributes this to depression. He does report nasal congestion at night.            REVIEW OF SYSTEMS    Review of Systems as per HPI and PMHx, otherwise 10 system review system are negative.       ALLERGIES    Patient has no known allergies.    CURRENT MEDICATIONS      Current Outpatient Medications:      acetaminophen (TYLENOL) 325 MG tablet, Take 325-650 mg by mouth, Disp: , Rfl:      albuterol (ACCUNEB) 0.63 MG/3ML neb solution, Take 3 mLs (0.63 mg) by nebulization every 4 hours as needed for shortness of breath / dyspnea or wheezing, Disp: 3 mL, Rfl: 3     azelastine (ASTELIN) 0.1 % nasal spray, 2 sprays each nostril 1-2x daily as needed for nasal congestion (use nightly for first 2 week), Disp: 30 mL, Rfl: 3     cetirizine (ZYRTEC) 10 MG tablet, Take 10 mg by mouth daily, Disp: , Rfl:      fluticasone-salmeterol (ADVAIR) 250-50 MCG/DOSE inhaler, Inhale 1 puff into the lungs 2 times daily, Disp: 60 each, Rfl: 11     ibuprofen (ADVIL/MOTRIN) 200 MG tablet, Take 200-400 mg by mouth, Disp: , Rfl:      montelukast (SINGULAIR) 10 MG tablet, TAKE 1 TABLET(10 MG) BY MOUTH AT BEDTIME, Disp: 90 tablet, Rfl: 1     omeprazole (PRILOSEC) 20 MG DR capsule, Take 1  capsule (20 mg) by mouth daily, Disp: , Rfl:      sertraline (ZOLOFT) 100 MG tablet, Take 1 tablet (100 mg) by mouth daily, Disp: 90 tablet, Rfl: 0     VENTOLIN  (90 Base) MCG/ACT inhaler, INHALE 2 PUFFS INTO THE LUNGS EVERY 4 HOURS AS NEEDED FOR SHORTNESS OF BREATH OR DIFFICULT BREATHING OR WHEEZING, Disp: 18 g, Rfl: 11     aluminum chloride (DRYSOL) 20 % external solution, Apply topically At Bedtime Start every 2-3 nights first, Disp: 60 mL, Rfl: 11     benzonatate (TESSALON) 200 MG capsule, Take 1 capsule (200 mg) by mouth 3 times daily as needed for cough, Disp: 30 capsule, Rfl: 0     metroNIDAZOLE (METROGEL) 0.75 % external gel, Apply to face BID (Patient not taking: Reported on 12/2/2022), Disp: 45 g, Rfl: 11    Current Facility-Administered Medications:      lidocaine 1 % injection 4 mL, 4 mL, , , Supik, Linwood, DO, 4 mL at 08/11/22 1724     lidocaine 1 % injection 4 mL, 4 mL, , , Supik, Linwood, DO, 4 mL at 08/11/22 1724     triamcinolone (KENALOG-40) injection 40 mg, 40 mg, , , Supik, Linwood, DO, 40 mg at 08/11/22 1724     triamcinolone (KENALOG-40) injection 40 mg, 40 mg, , , Supik, Linwood, DO, 40 mg at 08/11/22 1724     PAST MEDICAL HISTORY    PAST MEDICAL HISTORY:   Past Medical History:   Diagnosis Date     Alcohol dependence in remission (H)      Asthma      Depressive disorder      GERD (gastroesophageal reflux disease)        PAST SURGICAL HISTORY    PAST SURGICAL HISTORY:   Past Surgical History:   Procedure Laterality Date     COLONOSCOPY  2015    Normal       FAMILY  HISTORY    FAMILY HISTORY:   Family History   Problem Relation Age of Onset     Colon Cancer Mother 60     Substance Abuse Mother      Mental Illness Mother      Substance Abuse Father         alcohol     Mental Illness Father      Substance Abuse Maternal Grandfather      Substance Abuse Paternal Grandfather      Suicide No family hx of        SOCIAL HISTORY    SOCIAL HISTORY:   Social History     Tobacco Use     Smoking  status: Never     Smokeless tobacco: Never     Tobacco comments:     Occasionally   Substance Use Topics     Alcohol use: Not Currently        PHYSICAL EXAM    HEAD: Normal appearance and symmetry:  No cutaneous lesions.      NECK:  supple     EARS: normal TM, EACs    EYES:  EOMI    CN VII/XII:  intact     NOSE:     Dorsum:   straight  Septum:  deviated right  Mucosa:  moist        ORAL CAVITY/OROPHARYNX:     Lips:  Normal.  Tongue: normal  Mucosa:   no lesions  Tonsils; 3+ cryptic     NECK:  Trachea:  midline.              Thyroid:  normal              Adenopathy:  none        NEURO:   Alert and Oriented     GAIT AND STATION:  normal     RESPIRATORY:   Symmetry and Respiratory effort     PSYCH:  Normal mood and affect     SKIN:   warm and dry         IMPRESSION:    Encounter Diagnoses   Name Primary?     Nasal congestion Yes     Congestion of paranasal sinus      Class 2 obesity due to excess calories with body mass index (BMI) of 38.0 to 38.9 in adult, unspecified whether serious comorbidity present      Alcohol dependence in remission (H)      Recurrent major depressive disorder, in full remission (H)      Morbid obesity (H)           RECOMMENDATIONS:      Orders Placed This Encounter   Procedures     CT Sinus w/o Contrast     Comprehensive Weight Management      Trial of astelin spray  Return visit 2-3 months follow up      Again, thank you for allowing me to participate in the care of your patient.        Sincerely,        Joseph Juan MD

## 2022-12-02 NOTE — NURSING NOTE
Sino-Nasal Outcome Test (SNOT - 22)    1. Need to Blow Nose: (P) Moderate  2. Nasal Blockage: (P) Mild or slight  3. Sneezing: (P) Mild or slight  4. Runny Nose: (P) None  5. Cough: (P) Mild or slight  6. Post-nasal discharge: (P) Very mild  7. Thick nasal discharge: (P) None  8. Ear fullness: (P) Mild or slight  9. Dizziness: (P) None  10. Ear Pain: (P) None  11. Facial pain/pressure: (P) Very mild  12. Decreased Sense of Smell/Taste: (P) Very mild  13. Difficulty falling asleep: (P) Very mild  14. Wake up at night: (P) Moderate  15. Lack of a good night's sleep: (P) Mild or slight  16. Wake up tired: (P) Moderate  17. Fatigue: (P) Mild or slight  18. Reduced Productivity: (P) Mild or slight  19. Reduced Concentration: (P) Very mild  20. Frustrated/restless/irritable: (P) Very mild  21. Sad: (P) None  22. Embarrassed: (P) Moderate    Total Score: (P) 32    COPYRIGHT 1996. [facility] [place] . [facility],[place]    Giovana Lyle on 12/2/2022 at 8:33 AM

## 2022-12-03 ENCOUNTER — HOSPITAL ENCOUNTER (OUTPATIENT)
Dept: CT IMAGING | Facility: HOSPITAL | Age: 37
Discharge: HOME | End: 2022-12-03
Attending: OTOLARYNGOLOGY | Admitting: OTOLARYNGOLOGY
Payer: COMMERCIAL

## 2022-12-03 DIAGNOSIS — R09.81 CONGESTION OF PARANASAL SINUS: ICD-10-CM

## 2022-12-03 PROCEDURE — 70486 CT MAXILLOFACIAL W/O DYE: CPT

## 2022-12-15 DIAGNOSIS — J45.40 MODERATE PERSISTENT ASTHMA WITHOUT COMPLICATION: ICD-10-CM

## 2022-12-15 RX ORDER — FLUTICASONE PROPIONATE AND SALMETEROL 250; 50 UG/1; UG/1
POWDER RESPIRATORY (INHALATION)
Qty: 1 EACH | Refills: 0 | Status: SHIPPED | OUTPATIENT
Start: 2022-12-15 | End: 2023-01-09

## 2022-12-15 NOTE — LETTER
[address] [address]  [phone]  December 21, 2022  Jorge CARRILLO Madrigal  [address] [place] [id number]    Dear Jorge,    It looks like Dr Kenney is out of office the day you were scheduled for your physical. This will need to be reschedule you can do this through my chart or by calling [phone] to get rescheduled. Sorry about any inconvenience.     Thank you,     Dr Kenney's care team

## 2023-01-07 DIAGNOSIS — J45.40 MODERATE PERSISTENT ASTHMA WITHOUT COMPLICATION: ICD-10-CM

## 2023-01-09 RX ORDER — FLUTICASONE PROPIONATE AND SALMETEROL 250; 50 UG/1; UG/1
POWDER RESPIRATORY (INHALATION)
Qty: 1 EACH | Refills: 0 | Status: SHIPPED | OUTPATIENT
Start: 2023-01-09 | End: 2023-02-06

## 2023-01-29 ENCOUNTER — HEALTH MAINTENANCE LETTER (OUTPATIENT)
Age: 38
End: 2023-01-29

## 2023-02-04 DIAGNOSIS — J45.40 MODERATE PERSISTENT ASTHMA WITHOUT COMPLICATION: ICD-10-CM

## 2023-02-06 RX ORDER — FLUTICASONE PROPIONATE AND SALMETEROL 250; 50 UG/1; UG/1
POWDER RESPIRATORY (INHALATION)
Qty: 1 EACH | Refills: 0 | Status: SHIPPED | OUTPATIENT
Start: 2023-02-06 | End: 2023-03-03

## 2023-02-09 ASSESSMENT — ENCOUNTER SYMPTOMS
HEARTBURN: 0
FEVER: 0
ABDOMINAL PAIN: 0
PALPITATIONS: 0
JOINT SWELLING: 0
DIZZINESS: 0
HEADACHES: 0
HEMATOCHEZIA: 0
DIARRHEA: 0
MYALGIAS: 0
NERVOUS/ANXIOUS: 0
WEAKNESS: 0
DYSURIA: 0
ARTHRALGIAS: 0
CONSTIPATION: 0
FREQUENCY: 0
COUGH: 0
HEMATURIA: 0
PARESTHESIAS: 0
EYE PAIN: 0
NAUSEA: 0
CHILLS: 0
SORE THROAT: 0
SHORTNESS OF BREATH: 0

## 2023-02-09 ASSESSMENT — ASTHMA QUESTIONNAIRES
QUESTION_4 LAST FOUR WEEKS HOW OFTEN HAVE YOU USED YOUR RESCUE INHALER OR NEBULIZER MEDICATION (SUCH AS ALBUTEROL): TWO OR THREE TIMES PER WEEK
QUESTION_1 LAST FOUR WEEKS HOW MUCH OF THE TIME DID YOUR ASTHMA KEEP YOU FROM GETTING AS MUCH DONE AT WORK, SCHOOL OR AT HOME: NONE OF THE TIME
ACT_TOTALSCORE: 20
QUESTION_2 LAST FOUR WEEKS HOW OFTEN HAVE YOU HAD SHORTNESS OF BREATH: ONCE OR TWICE A WEEK
QUESTION_3 LAST FOUR WEEKS HOW OFTEN DID YOUR ASTHMA SYMPTOMS (WHEEZING, COUGHING, SHORTNESS OF BREATH, CHEST TIGHTNESS OR PAIN) WAKE YOU UP AT NIGHT OR EARLIER THAN USUAL IN THE MORNING: ONCE OR TWICE
QUESTION_5 LAST FOUR WEEKS HOW WOULD YOU RATE YOUR ASTHMA CONTROL: WELL CONTROLLED
ACT_TOTALSCORE: 20

## 2023-02-16 ENCOUNTER — OFFICE VISIT (OUTPATIENT)
Dept: INTERNAL MEDICINE | Facility: CLINIC | Age: 38
End: 2023-02-16
Payer: COMMERCIAL

## 2023-02-16 VITALS
BODY MASS INDEX: 39.52 KG/M2 | SYSTOLIC BLOOD PRESSURE: 142 MMHG | OXYGEN SATURATION: 97 % | DIASTOLIC BLOOD PRESSURE: 90 MMHG | HEIGHT: 72 IN | WEIGHT: 291.8 LBS | HEART RATE: 70 BPM | TEMPERATURE: 97.8 F | RESPIRATION RATE: 20 BRPM

## 2023-02-16 DIAGNOSIS — E11.65 TYPE 2 DIABETES MELLITUS WITH HYPERGLYCEMIA, WITHOUT LONG-TERM CURRENT USE OF INSULIN (H): ICD-10-CM

## 2023-02-16 DIAGNOSIS — Z00.00 ROUTINE GENERAL MEDICAL EXAMINATION AT A HEALTH CARE FACILITY: Primary | ICD-10-CM

## 2023-02-16 DIAGNOSIS — J45.40 MODERATE PERSISTENT ASTHMA WITHOUT COMPLICATION: ICD-10-CM

## 2023-02-16 DIAGNOSIS — F33.42 RECURRENT MAJOR DEPRESSIVE DISORDER, IN FULL REMISSION (H): ICD-10-CM

## 2023-02-16 DIAGNOSIS — I10 BENIGN ESSENTIAL HYPERTENSION: ICD-10-CM

## 2023-02-16 DIAGNOSIS — Z13.220 LIPID SCREENING: ICD-10-CM

## 2023-02-16 DIAGNOSIS — E66.01 MORBID OBESITY (H): ICD-10-CM

## 2023-02-16 DIAGNOSIS — Z13.1 SCREENING FOR DIABETES MELLITUS: ICD-10-CM

## 2023-02-16 PROBLEM — U07.1 INFECTION DUE TO 2019 NOVEL CORONAVIRUS: Status: RESOLVED | Noted: 2022-11-03 | Resolved: 2023-02-16

## 2023-02-16 PROBLEM — R06.02 SHORTNESS OF BREATH: Status: RESOLVED | Noted: 2022-11-03 | Resolved: 2023-02-16

## 2023-02-16 PROBLEM — Z11.1 SCREENING EXAMINATION FOR PULMONARY TUBERCULOSIS: Status: RESOLVED | Noted: 2019-06-07 | Resolved: 2023-02-16

## 2023-02-16 PROBLEM — R50.9 FEVER, UNSPECIFIED FEVER CAUSE: Status: RESOLVED | Noted: 2022-11-03 | Resolved: 2023-02-16

## 2023-02-16 PROBLEM — J02.9 SORE THROAT: Status: RESOLVED | Noted: 2022-11-03 | Resolved: 2023-02-16

## 2023-02-16 LAB
ANION GAP SERPL CALCULATED.3IONS-SCNC: 11 MMOL/L (ref 7–15)
BUN SERPL-MCNC: 16.9 MG/DL (ref 6–20)
CALCIUM SERPL-MCNC: 9.5 MG/DL (ref 8.6–10)
CHLORIDE SERPL-SCNC: 103 MMOL/L (ref 98–107)
CHOLEST SERPL-MCNC: 252 MG/DL
CREAT SERPL-MCNC: 1.13 MG/DL (ref 0.67–1.17)
DEPRECATED HCO3 PLAS-SCNC: 26 MMOL/L (ref 22–29)
GFR SERPL CREATININE-BSD FRML MDRD: 86 ML/MIN/1.73M2
GLUCOSE SERPL-MCNC: 202 MG/DL (ref 70–99)
HBA1C MFR BLD: 7.6 % (ref 0–5.6)
HDLC SERPL-MCNC: 36 MG/DL
LDLC SERPL CALC-MCNC: ABNORMAL MG/DL
NONHDLC SERPL-MCNC: 216 MG/DL
POTASSIUM SERPL-SCNC: 4.7 MMOL/L (ref 3.4–5.3)
SODIUM SERPL-SCNC: 140 MMOL/L (ref 136–145)
TRIGL SERPL-MCNC: 524 MG/DL

## 2023-02-16 PROCEDURE — 99214 OFFICE O/P EST MOD 30 MIN: CPT | Mod: 25 | Performed by: INTERNAL MEDICINE

## 2023-02-16 PROCEDURE — 83036 HEMOGLOBIN GLYCOSYLATED A1C: CPT | Performed by: INTERNAL MEDICINE

## 2023-02-16 PROCEDURE — 90677 PCV20 VACCINE IM: CPT | Performed by: INTERNAL MEDICINE

## 2023-02-16 PROCEDURE — 99395 PREV VISIT EST AGE 18-39: CPT | Mod: 25 | Performed by: INTERNAL MEDICINE

## 2023-02-16 PROCEDURE — 90471 IMMUNIZATION ADMIN: CPT | Performed by: INTERNAL MEDICINE

## 2023-02-16 PROCEDURE — 36415 COLL VENOUS BLD VENIPUNCTURE: CPT | Performed by: INTERNAL MEDICINE

## 2023-02-16 PROCEDURE — 80048 BASIC METABOLIC PNL TOTAL CA: CPT | Performed by: INTERNAL MEDICINE

## 2023-02-16 PROCEDURE — 80061 LIPID PANEL: CPT | Performed by: INTERNAL MEDICINE

## 2023-02-16 RX ORDER — LISINOPRIL 10 MG/1
10 TABLET ORAL AT BEDTIME
Qty: 90 TABLET | Refills: 0 | Status: SHIPPED | OUTPATIENT
Start: 2023-02-16 | End: 2023-04-27

## 2023-02-16 ASSESSMENT — ENCOUNTER SYMPTOMS
CHILLS: 0
HEARTBURN: 0
HEADACHES: 0
ABDOMINAL PAIN: 0
DYSURIA: 0
HEMATURIA: 0
NERVOUS/ANXIOUS: 0
COUGH: 0
PARESTHESIAS: 0
FREQUENCY: 0
SHORTNESS OF BREATH: 0
ARTHRALGIAS: 0
WEAKNESS: 0
MYALGIAS: 0
NAUSEA: 0
SORE THROAT: 0
DIARRHEA: 0
PALPITATIONS: 0
DIZZINESS: 0
JOINT SWELLING: 0
FEVER: 0
EYE PAIN: 0
HEMATOCHEZIA: 0
CONSTIPATION: 0

## 2023-02-16 ASSESSMENT — PATIENT HEALTH QUESTIONNAIRE - PHQ9
SUM OF ALL RESPONSES TO PHQ QUESTIONS 1-9: 6
SUM OF ALL RESPONSES TO PHQ QUESTIONS 1-9: 6
10. IF YOU CHECKED OFF ANY PROBLEMS, HOW DIFFICULT HAVE THESE PROBLEMS MADE IT FOR YOU TO DO YOUR WORK, TAKE CARE OF THINGS AT HOME, OR GET ALONG WITH OTHER PEOPLE: SOMEWHAT DIFFICULT

## 2023-02-16 NOTE — PATIENT INSTRUCTIONS
Preventive Health Recommendations  Male Ages 26 - 39    Yearly exam:             See your health care provider every year in order to  o   Review health changes.   o   Discuss preventive care.    o   Review your medicines if your doctor has prescribed any.  You should be tested each year for STDs (sexually transmitted diseases), if you re at risk.   After age 35, talk to your provider about cholesterol testing. If you are at risk for heart disease, have your cholesterol tested at least every 5 years.   If you are at risk for diabetes, you should have a diabetes test (fasting glucose).  Shots: Get a flu shot each year. Get a tetanus shot every 10 years.     Nutrition:  Eat at least 5 servings of fruits and vegetables daily.   Eat whole-grain bread, whole-wheat pasta and brown rice instead of white grains and rice.   Get adequate Calcium and Vitamin D.     Lifestyle  Exercise for at least 150 minutes a week (30 minutes a day, 5 days a week). This will help you control your weight and prevent disease.   Limit alcohol to one drink per day.   No smoking.   Wear sunscreen to prevent skin cancer.   See your dentist every six months for an exam and cleaning.   [employer] website  https://www.[email or web address]/whos-it-for/health/blood-pressure/    When checking your blood pressure at home make sure you do the following:  No exercise, caffeine or nicotine within the past 30 minutes  Sit down and relax for 5 to 10 minutes before checking  3.   Check your blood pressure 3 times  by 1 minute intervals.  Average the 3 readings to get your final value.  4.   If you do not like math instead of averaging the 3 values you can cross out the high and low readings and use the middle value.

## 2023-02-16 NOTE — PROGRESS NOTES
SUBJECTIVE:   CC: Jorge is an 37 year old who presents for preventative health visit.     Patient has been advised of split billing requirements and indicates understanding: Yes  Healthy Habits:     Getting at least 3 servings of Calcium per day:  Yes    Bi-annual eye exam:  NO    Dental care twice a year:  Yes    Sleep apnea or symptoms of sleep apnea:  Excessive snoring    Diet:  Regular (no restrictions)    Frequency of exercise:  2-3 days/week    Duration of exercise:  30-45 minutes    Taking medications regularly:  Yes    Medication side effects:  None    PHQ-2 Total Score: 1    Additional concerns today:  No        Today's PHQ-2 Score:   PHQ-2 ( 1999 [employer]) 2/9/2023   Q1: Little interest or pleasure in doing things 0   Q2: Feeling down, depressed or hopeless 0   PHQ-2 Score 0   PHQ-2 Total Score (12-17 Years)- Positive if 3 or more points; Administer PHQ-A if positive -   Q1: Little interest or pleasure in doing things Not at all   Q2: Feeling down, depressed or hopeless Not at all   PHQ-2 Score 0         Social History     Tobacco Use     Smoking status: Never     Smokeless tobacco: Never     Tobacco comments:     Occasionally   Substance Use Topics     Alcohol use: Not Currently         Alcohol Use 2/9/2023   Prescreen: >3 drinks/day or >7 drinks/week? Not Applicable   Prescreen: >3 drinks/day or >7 drinks/week? -   AUDIT SCORE  -       Last PSA: No results found for: PSA    Reviewed orders with patient. Reviewed health maintenance and updated orders accordingly - Yes  Lab work is in process  Labs reviewed in EPIC    Reviewed and updated as needed this visit by clinical staff   Tobacco  Allergies  Meds      Soc Hx        Reviewed and updated as needed this visit by Provider   Tobacco        Soc Hx           Review of Systems   Constitutional: Negative for chills and fever.   HENT: Positive for congestion. Negative for ear pain, hearing loss and sore throat.    Eyes: Negative for pain and visual  disturbance.   Respiratory: Negative for cough and shortness of breath.    Cardiovascular: Negative for chest pain, palpitations and peripheral edema.   Gastrointestinal: Negative for abdominal pain, constipation, diarrhea, heartburn, hematochezia and nausea.   Genitourinary: Negative for dysuria, frequency, genital sores, hematuria, impotence, penile discharge and urgency.   Musculoskeletal: Negative for arthralgias, joint swelling and myalgias.   Skin: Negative for rash.   Neurological: Negative for dizziness, weakness, headaches and paresthesias.   Psychiatric/Behavioral: Negative for mood changes. The patient is not nervous/anxious.        OBJECTIVE:   BP (!) 142/90   Pulse 70   Temp 97.8  F (36.6  C) (Temporal)   Resp 20   Ht 1.829 m (6')   Wt 132.4 kg (291 lb 12.8 oz)   SpO2 97%   BMI 39.58 kg/m      Physical Exam  GENERAL: alert, no distress and obese  EYES: Eyes grossly normal to inspection, PERRL and conjunctivae and sclerae normal  HENT: ear canals and TM's normal, nose and mouth without ulcers or lesions  NECK: no adenopathy, no asymmetry, masses, or scars and thyroid normal to palpation  RESP: lungs clear to auscultation - no rales, rhonchi or wheezes  CV: regular rate and rhythm, normal S1 S2, no S3 or S4, no murmur, click or rub, no peripheral edema and peripheral pulses strong  ABDOMEN: soft, nontender, no hepatosplenomegaly, no masses and bowel sounds normal  MS: no gross musculoskeletal defects noted, no edema  SKIN: no suspicious lesions or rashes  NEURO: Normal strength and tone, mentation intact and speech normal  PSYCH: mentation appears normal, affect normal/bright  LYMPH: no cervical, supraclavicular, axillary, or inguinal adenopathy    Labs reviewed in Epic  Results for orders placed or performed in visit on 02/16/23 (from the past 24 hour(s))   Hemoglobin A1c   Result Value Ref Range    Hemoglobin A1C 7.6 (H) 0.0 - 5.6 %       ASSESSMENT/PLAN:       ICD-10-CM    1. Routine general  medical examination at a health care facility  Z00.00       2. Benign essential hypertension  I10 lisinopril (ZESTRIL) 10 MG tablet      3. Morbid obesity (H)  E66.01 Comprehensive Weight Management      4. Screening for diabetes mellitus  Z13.1 Basic metabolic panel     Hemoglobin A1c     Basic metabolic panel     Hemoglobin A1c      5. Lipid screening  Z13.220 Lipid Profile     Lipid Profile        -Updated screening, immunizations, prevention.  Please see health maintenance list, care gaps  -start ACei  -CDE    -weight loss   Patient has been advised of split billing requirements and indicates understanding: Yes      COUNSELING:   Reviewed preventive health counseling, as reflected in patient instructions       new onset type 2 diabetes       Regular exercise       Healthy diet/nutrition      BMI:   Estimated body mass index is 39.58 kg/m  as calculated from the following:    Height as of this encounter: 1.829 m (6').    Weight as of this encounter: 132.4 kg (291 lb 12.8 oz).   Weight management plan: Discussed healthy diet and exercise guidelines f/u to start metformin +/- GLP-A for T2DM      He reports that he has never smoked. He has never used smokeless tobacco.            Otto Kenney MD  [facility]

## 2023-03-03 ENCOUNTER — VIRTUAL VISIT (OUTPATIENT)
Dept: INTERNAL MEDICINE | Facility: CLINIC | Age: 38
End: 2023-03-03
Payer: COMMERCIAL

## 2023-03-03 DIAGNOSIS — E66.01 MORBID OBESITY (H): ICD-10-CM

## 2023-03-03 DIAGNOSIS — E11.65 TYPE 2 DIABETES MELLITUS WITH HYPERGLYCEMIA, WITHOUT LONG-TERM CURRENT USE OF INSULIN (H): Primary | ICD-10-CM

## 2023-03-03 PROCEDURE — 99213 OFFICE O/P EST LOW 20 MIN: CPT | Mod: GT | Performed by: INTERNAL MEDICINE

## 2023-03-03 RX ORDER — FLUTICASONE PROPIONATE AND SALMETEROL 250; 50 UG/1; UG/1
POWDER RESPIRATORY (INHALATION)
Qty: 1 EACH | Refills: 11 | Status: SHIPPED | OUTPATIENT
Start: 2023-03-03 | End: 2024-04-01

## 2023-03-03 RX ORDER — SERTRALINE HYDROCHLORIDE 100 MG/1
100 TABLET, FILM COATED ORAL DAILY
Qty: 90 TABLET | Refills: 3 | Status: SHIPPED | OUTPATIENT
Start: 2023-03-03

## 2023-03-03 RX ORDER — LANCETS
EACH MISCELLANEOUS
Qty: 100 EACH | Refills: 11 | Status: SHIPPED | OUTPATIENT
Start: 2023-03-03

## 2023-03-03 NOTE — PROGRESS NOTES
Jorge is a 37 year old who is being evaluated via a billable video visit.      How would you like to obtain your AVS? MyChart  If the video visit is dropped, the invitation should be resent by: Send to e-mail at: [email or web address]  Will anyone else be joining your video visit? No      Assessment & Plan     Type 2 diabetes mellitus with hyperglycemia, without long-term current use of insulin (H)  Start metformin  See CDE  a1c 3 months  Focus on weight loss  - metFORMIN (GLUCOPHAGE) 500 MG tablet; Take 1 tablet (500 mg) by mouth daily (with dinner) for 14 days, THEN 1 tablet (500 mg) 2 times daily (with meals).  - blood glucose monitoring (NO BRAND SPECIFIED) meter device kit; Use to test blood sugar 1x times daily. Preferred Blood Glucose Monitor Brands: per insurance.  - blood glucose (NO BRAND SPECIFIED) test strip; Use to test blood sugar 1x times daily. Preferred Blood Glucose Monitor Brands: per insurance.  - blood glucose calibration (NO BRAND SPECIFIED) solution; To accompany: Blood Glucose Monitor Brands: per insurance.  - thin (NO BRAND SPECIFIED) lancets; Use to test blood sugar 1x times daily. Preferred Blood Glucose Monitor Brands: per insurance.    Morbid obesity (H)  Weight loss      Ordering of each unique test  Prescription drug management   Review labs         See Patient Instructions    No follow-ups on file.    Otto Kenney MD  [facility]   Jorge is a 37 year old, presenting for the following health issues:  Results      History of Present Illness       Reason for visit:  Discuss lab results    He eats 2-3 servings of fruits and vegetables daily.He consumes 0 sweetened beverage(s) daily.He exercises with enough effort to increase his heart rate 9 or less minutes per day.  He exercises with enough effort to increase his heart rate 3 or less days per week.   He is taking medications regularly.             Review of Systems         Objective            Vitals:  No vitals were obtained today due to virtual visit.    Physical Exam   GENERAL: alert, no distress and over weight                Video-Visit Details    Type of service:  Video Visit   Video Start Time: 445  Video End Time:5:06 PM    Originating Location (pt. Location): Home    Distant Location (provider location):  On-site  Platform used for Video Visit: "[employer]

## 2023-03-06 ENCOUNTER — TELEPHONE (OUTPATIENT)
Dept: INTERNAL MEDICINE | Facility: CLINIC | Age: 38
End: 2023-03-06
Payer: COMMERCIAL

## 2023-03-08 DIAGNOSIS — J45.20 MILD INTERMITTENT ASTHMA, UNSPECIFIED WHETHER COMPLICATED: ICD-10-CM

## 2023-03-09 RX ORDER — ALBUTEROL SULFATE 0.63 MG/3ML
1 SOLUTION RESPIRATORY (INHALATION) EVERY 4 HOURS PRN
Qty: 60 ML | Refills: 11 | Status: SHIPPED | OUTPATIENT
Start: 2023-03-09

## 2023-03-09 NOTE — TELEPHONE ENCOUNTER
Please see mychart from patient and advise on appropriate course of action.     Gilda London RN    [facility] Triage Nurse    
No

## 2023-03-28 ENCOUNTER — VIRTUAL VISIT (OUTPATIENT)
Dept: EDUCATION SERVICES | Facility: CLINIC | Age: 38
End: 2023-03-28
Payer: COMMERCIAL

## 2023-03-28 DIAGNOSIS — E11.65 TYPE 2 DIABETES MELLITUS WITH HYPERGLYCEMIA, WITHOUT LONG-TERM CURRENT USE OF INSULIN (H): ICD-10-CM

## 2023-03-28 PROCEDURE — G0108 DIAB MANAGE TRN  PER INDIV: HCPCS | Mod: 93

## 2023-03-28 NOTE — LETTER
"    3/28/2023         RE: Jorge Madrigal   [address] [facility] [address]        Dear Colleague,    Thank you for referring your patient, Jorge Madrigal, to the [facility]. Please see a copy of my visit note below.    Diabetes Self-Management Education & Support    Presents for: Initial Assessment for new diagnosis    Type of Service: Telephone Visit    Originating Location (Patient Location): Home  Distant Location (Provider Location): Offsite  Mode of Communication:  Telephone    Telephone Visit Start Time: 12 PM  Telephone Visit End Time (telephone visit stop time): 1 PM    How would patient like to obtain AVS? my chart    ASSESSMENT:  Shahida states \"I have not started taking the Metformin or checking my blood sugars. I am putting this off until I make some drastic changes.\"    Discussed Type 2 diabetes diagnosis, A1c, BG testing and diabetes medication recommendations.  Discussed meal planning, carbohydrate counting and exercise goals.   Shahida has a good understanding of concepts and plans to start checking BG and taking Metformin as prescribed.     Patient's most recent   Lab Results   Component Value Date    A1C 7.6 2023     is not meeting goal of <7.0    Diabetes knowledge and skills assessment:   Patient is knowledgeable in diabetes management concepts related to: Being Active    Continue education with the following diabetes management concepts: Healthy Eating, Being Active, Monitoring, Taking Medication, Problem Solving, Reducing Risks and Healthy Coping    Based on learning assessment above, most appropriate setting for further diabetes education would be: Individual setting.      PLAN  Check blood sugars fasting and 2 hours after start of meal.   Fastin-130 mg/dL  2 hours after start of meal: <180 mg/dL  Keep a blood glucose record for next visit.    Meal Plan Recommendation: eat 3 meals a day, have small snacks between meals.   Use portion control and plate planning " method.  Carbohydrates to aim for at meals: 45-60 grams and snacks: 0-30 grams.    Use diabetesRiskonnect.org for recipe and meal planning ideas  Use [employer] anthony for carbohydrate reference.    Topics to cover at upcoming visits: Healthy Eating, Being Active, Monitoring, Taking Medication, Problem Solving, Reducing Risks and Healthy Coping    Follow-up: 5/11/23    See Care Plan for co-developed, patient-state behavior change goals.  AVS provided for patient today.    Education Materials Provided:   [employer] Understanding Diabetes Booklet      SUBJECTIVE/OBJECTIVE:  Presents for: Initial Assessment for new diagnosis  Accompanied by: Self  Diabetes education in the past 24mo: No  Focus of Visit: Monitoring, Taking Medication, Being Active, Healthy Eating  Diabetes type: Type 2  Disease course: Stable  How confident are you filling out medical forms by yourself:: Extremely  Transportation concerns: No  Difficulty affording diabetes medication?: No  Difficulty affording diabetes testing supplies?: No  Other concerns:: None  Cultural Influences/Ethnic Background:  Not  or     Diabetes Symptoms & Complications:  Fatigue: No  Polydipsia: No  Polyuria: No  Visual change: No  Slow healing wounds: No  Symptom course: Stable  Weight trend: Increasing  Complications assessed today?: No    Patient Problem List and Family Medical History reviewed for relevant medical history, current medical status, and diabetes risk factors.    Vitals:  There were no vitals taken for this visit.  Estimated body mass index is 39.58 kg/m  as calculated from the following:    Height as of 2/16/23: 1.829 m (6').    Weight as of 2/16/23: 132.4 kg (291 lb 12.8 oz).   Last 3 BP:   BP Readings from Last 3 Encounters:   02/16/23 (!) 142/90   08/11/22 (!) 150/90   06/15/22 (!) 140/88       History   Smoking Status     Never   Smokeless Tobacco     Never       Labs:  Lab Results   Component Value Date    A1C 7.6 02/16/2023     Lab  "Results   Component Value Date     02/16/2023     11/29/2021     01/16/2020     Lab Results   Component Value Date    LDL  02/16/2023      Comment:      Cannot estimate LDL when triglyceride exceeds 400 mg/dL     Direct Measure HDL   Date Value Ref Range Status   02/16/2023 36 (L) >=40 mg/dL Final   ]  GFR Estimate   Date Value Ref Range Status   02/16/2023 86 >60 mL/min/1.73m2 Final     Comment:     eGFR calculated using 2021 CKD-EPI equation.   01/16/2020 >90 >60 mL/min/[1.73_m2] Final     Comment:     Non  GFR Calc  Starting 12/18/2018, serum creatinine based estimated GFR (eGFR) will be   calculated using the Chronic Kidney Disease Epidemiology Collaboration   (CKD-EPI) equation.       GFR Estimate If Black   Date Value Ref Range Status   01/16/2020 >90 >60 mL/min/[1.73_m2] Final     Comment:      GFR Calc  Starting 12/18/2018, serum creatinine based estimated GFR (eGFR) will be   calculated using the Chronic Kidney Disease Epidemiology Collaboration   (CKD-EPI) equation.       Lab Results   Component Value Date    CR 1.13 02/16/2023    CR 1.05 01/16/2020     No results found for: MICROALBUMIN    Healthy Eating:  Healthy Eating Assessed Today: Yes  Cultural/Sabianist diet restrictions?: No  Meal planning/habits: None  How many times a week on average do you eat food made away from home (restaurant/take-out)?: 2  Meals include: Breakfast, Lunch, Dinner  Breakfast: 9 AM: yogurt with granola or skips  Lunch:  PM: couple slices of homemade pizza, water  Dinner: 6 PM: black bean soup with tortilla, water  Snacks: at work- possibly popcorn or nuts, after work- fries, \"bad habit of snacking before bed- ice cream and cookies  Other: typically doesn't drink sweetened beverages  Beverages: Water, Tea, Diet soda (no alcohol use anymore)  Has patient met with a dietitian in the past?: No    Being Active:  Being Active Assessed Today: Yes  Exercise:: Yes (swims and " weight trains)  Days per week of moderate to strenuous exercise (like a brisk walk): 3  On average, minutes per day of exercise at this level: 120  How intense was your typical exercise? : Heavy (like jogging or swimming  Exercise Minutes per Week: 360  Barrier to exercise: Time    Monitoring:  Monitoring Assessed Today: Yes  Did patient bring glucose meter to appointment? : No    He is not testing his BG.     Taking Medications:  Diabetes Medication(s)     Biguanides       metFORMIN (GLUCOPHAGE) 500 MG tablet    Take 1 tablet (500 mg) by mouth daily (with dinner) for 14 days, THEN 1 tablet (500 mg) 2 times daily (with meals).          Taking Medication Assessed Today: Yes  Current Treatments: Oral Medication (taken by mouth)  Problems taking diabetes medications regularly?: Yes (He has not started)    Problem Solving:  Problem Solving Assessed Today: Yes  Is the patient at risk for hypoglycemia?: No  Is the patient at risk for DKA?: No    Reducing Risks:  Reducing Risks Assessed Today: No    Healthy Coping:  Healthy Coping Assessed Today: Yes  Emotional response to diabetes: Ready to learn  Stage of change: ACTION (Actively working towards change)  Patient Activation Measure Survey Score:  No flowsheet data found.    Care Plan and Education Provided:  Care Plan: Diabetes   Updates made by Simi Elizabeth since 3/28/2023 12:00 AM      Problem: HbA1C Not In Goal       Goal: Establish Regular Follow-Ups with PCP       Task: Discuss with PCP the recommended timing for patient's next follow up visit(s)    Responsible User: Simi Elizabeth      Task: Discuss schedule for PCP visits with patient    Responsible User: Simi Elizabeth      Goal: Get HbA1C Level in Goal       Task: Educate patient on diabetes education self-management topics    Responsible User: Simi Elizabeth      Task: Educate patient on benefits of regular glucose monitoring    Responsible User: Simi Elizabeth      Task: Refer patient to appropriate  extended care team member, as needed (Medication Therapy Management, Behavioral Health, Physical Therapy, etc.)    Responsible User: Simi Elizabeth      Task: Discuss diabetes treatment plan with patient    Responsible User: Simi Elizabeth      Problem: Diabetes Self-Management Education Needed to Optimize Self-Care Behaviors       Goal: Understand diabetes pathophysiology and disease progression       Task: Provide education on diabetes pathophysiology and disease progression specfic to patient's diabetes type Completed 3/28/2023   Responsible User: Simi Elizabeth      Goal: Healthy Eating - follow a healthy eating pattern for diabetes       Task: Provide education on portion control and consistency in amount, composition and timing of food intake Completed 3/28/2023   Responsible User: Simi Elizabeth      Task: Provide education on managing carbohydrate intake (carbohydrate counting, plate planning method, etc.) Completed 3/28/2023   Responsible User: Simi Elizabeth      Task: Provide education on weight management    Responsible User: Simi Elizabeth      Task: Provide education on heart healthy eating    Responsible User: Simi Elizabeth      Task: Provide education on eating out    Responsible User: Simi Elizabeth      Task: Develop individualized healthy eating plan with patient    Responsible User: Simi Elizabeth      Goal: Being Active - get regular physical activity, working up to at least 150 minutes per week       Task: Provide education on relationship of activity to glucose and precautions to take if at risk for low glucose Completed 3/28/2023   Responsible User: Simi Elizabeth      Task: Discuss barriers to physical activity with patient Completed 3/28/2023   Responsible User: Simi Elizabeth      Task: Develop physical activity plan with patient    Responsible User: Simi Elizabeth      Task: Explore community resources including walking groups, assistance programs, and home videos     Responsible User: Simi Elizabeth      Goal: Monitoring - monitor glucose and ketones as directed    Start Date: 3/28/2023   This Visit's Progress: 0%   Note:    Start testing blood sugar two times a day     Task: Provide education on blood glucose monitoring (purpose, proper technique, frequency, glucose targets, interpreting results, when to use glucose control solution, sharps disposal) Completed 3/28/2023   Responsible User: Simi Elizabeth      Task: Provide education on continuous glucose monitoring (sensor placement, use of anthony or /reader, understanding glucose trends, alerts and alarms, differences between sensor glucose and blood glucose)    Responsible User: Simi Elizabeth      Task: Provide education on ketone monitoring (when to monitor, frequency, etc.)    Responsible User: Simi Elizabeth      Goal: Taking Medication - patient is consistently taking medications as directed       Task: Provide education on action of prescribed medication, including when to take and possible side effects Completed 3/28/2023   Responsible User: Simi Elizabeth      Task: Provide education on insulin and injectable diabetes medications, including administration, storage, site selection and rotation for injection sites    Responsible User: Simi Elizabeth      Task: Discuss barriers to medication adherence with patient and provide management technique ideas as appropriate    Responsible User: Simi Elizabeth      Task: Provide education on frequency and refill details of medications    Responsible User: Simi Elizabeth      Goal: Problem Solving - know how to prevent and manage short-term diabetes complications       Task: Provide education on high blood glucose - causes, signs/symptoms, prevention and treatment Completed 3/28/2023   Responsible User: Simi Elizabeth      Task: Provide education on low blood glucose - causes, signs/symptoms, prevention, treatment, carrying a carbohydrate source at all  times, and medical identification    Responsible User: Simi Elizabeth      Task: Provide education on safe travel with diabetes    Responsible User: Simi Elizabeth      Task: Provide education on how to care for diabetes on sick days    Responsible User: Simi Elizabeth      Task: Provide education on when to call a health care provider    Responsible User: Simi Elizabeth      Goal: Reducing Risks - know how to prevent and treat long-term diabetes complications       Task: Provide education on major complications of diabetes, prevention, early diagnostic measures and treatment of complications Completed 3/28/2023   Responsible User: Simi Elizabeth      Task: Provide education on recommended care for dental, eye and foot health    Responsible User: Simi Elizabeth      Task: Provide education on Hemoglobin A1c - goals and relationship to blood glucose levels    Responsible User: Simi Elizabeth      Task: Provide education on recommendations for heart health - lipid levels and goals, blood pressure and goals, and aspirin therapy, if indicated    Responsible User: Simi Elizabeth      Task: Provide education on tobacco cessation    Responsible User: Simi Elizabeth      Goal: Healthy Coping - use available resources to cope with the challenges of managing diabetes       Task: Discuss recognizing feelings about having diabetes    Responsible User: Simi Elizabeth      Task: Provide education on the benefits of making appropriate lifestyle changes Completed 3/28/2023   Responsible User: Simi Elizabeth      Task: Provide education on benefits of utilizing support systems    Responsible User: Simi Elizabeth      Task: Discuss methods for coping with stress    Responsible User: Simi Elizabeth      Task: Provide education on when to seek professional counseling    Responsible User: Simi Elizabeth RDN, LD, CDCES    Time Spent: 60 minutes  Encounter Type: Individual    Any diabetes  medication dose changes were made via the CDE Protocol per the patient's referring provider. A copy of this encounter was shared with the provider.

## 2023-03-28 NOTE — PATIENT INSTRUCTIONS
Jona Patel,    It was great meeting you today.  Below are reminders from the visit. I sent the Understanding Diabetes information in the mail.       Check blood sugars fasting and 2 hours after start of meal.  Fastin-130 mg/dL  2 hours after start of meal: <180 mg/dL  Keep a blood glucose record for next visit.    Meal Plan Recommendation: eat 3 meals a day, have small snacks between meals.   Use portion control and plate planning method.  Carbohydrates to aim for at meals: 45-60 grams and snacks: 0-30 grams.    Use diabetesfoSplash Technologyb.org for recipe and meal planning ideas  Use [employer] for carbohydrate reference.    Exercise / activity plan: Recommend starting slow and increasing as tolerated to goal of 150 minutes per week.     Start Metformin as recommended by your PCP.    Simi Elizabeth RDN, LD, [facility]  Diabetes Education Triage Line: [phone]  Diabetes Education Appointment [phone]

## 2023-03-28 NOTE — PROGRESS NOTES
"Diabetes Self-Management Education & Support    Presents for: Initial Assessment for new diagnosis    Type of Service: Telephone Visit    Originating Location (Patient Location): Home  Distant Location (Provider Location): Offsite  Mode of Communication:  Telephone    Telephone Visit Start Time: 12 PM  Telephone Visit End Time (telephone visit stop time): 1 PM    How would patient like to obtain AVS? my chart    ASSESSMENT:  Shahida states \"I have not started taking the Metformin or checking my blood sugars. I am putting this off until I make some drastic changes.\"    Discussed Type 2 diabetes diagnosis, A1c, BG testing and diabetes medication recommendations.  Discussed meal planning, carbohydrate counting and exercise goals.   Shahida has a good understanding of concepts and plans to start checking BG and taking Metformin as prescribed.     Patient's most recent   Lab Results   Component Value Date    A1C 7.6 2023     is not meeting goal of <7.0    Diabetes knowledge and skills assessment:   Patient is knowledgeable in diabetes management concepts related to: Being Active    Continue education with the following diabetes management concepts: Healthy Eating, Being Active, Monitoring, Taking Medication, Problem Solving, Reducing Risks and Healthy Coping    Based on learning assessment above, most appropriate setting for further diabetes education would be: Individual setting.      PLAN  Check blood sugars fasting and 2 hours after start of meal.   Fastin-130 mg/dL  2 hours after start of meal: <180 mg/dL  Keep a blood glucose record for next visit.    Meal Plan Recommendation: eat 3 meals a day, have small snacks between meals.   Use portion control and plate planning method.  Carbohydrates to aim for at meals: 45-60 grams and snacks: 0-30 grams.    Use diabetesfoZaelabb.org for recipe and meal planning ideas  Use [employer] anthony for carbohydrate reference.    Topics to cover at upcoming visits: Healthy " Eating, Being Active, Monitoring, Taking Medication, Problem Solving, Reducing Risks and Healthy Coping    Follow-up: 5/11/23    See Care Plan for co-developed, patient-state behavior change goals.  AVS provided for patient today.    Education Materials Provided:  [facility] Understanding Diabetes Booklet      SUBJECTIVE/OBJECTIVE:  Presents for: Initial Assessment for new diagnosis  Accompanied by: Self  Diabetes education in the past 24mo: No  Focus of Visit: Monitoring, Taking Medication, Being Active, Healthy Eating  Diabetes type: Type 2  Disease course: Stable  How confident are you filling out medical forms by yourself:: Extremely  Transportation concerns: No  Difficulty affording diabetes medication?: No  Difficulty affording diabetes testing supplies?: No  Other concerns:: None  Cultural Influences/Ethnic Background:  Not  or     Diabetes Symptoms & Complications:  Fatigue: No  Polydipsia: No  Polyuria: No  Visual change: No  Slow healing wounds: No  Symptom course: Stable  Weight trend: Increasing  Complications assessed today?: No    Patient Problem List and Family Medical History reviewed for relevant medical history, current medical status, and diabetes risk factors.    Vitals:  There were no vitals taken for this visit.  Estimated body mass index is 39.58 kg/m  as calculated from the following:    Height as of 2/16/23: 1.829 m (6').    Weight as of 2/16/23: 132.4 kg (291 lb 12.8 oz).   Last 3 BP:   BP Readings from Last 3 Encounters:   02/16/23 (!) 142/90   08/11/22 (!) 150/90   06/15/22 (!) 140/88       History   Smoking Status     Never   Smokeless Tobacco     Never       Labs:  Lab Results   Component Value Date    A1C 7.6 02/16/2023     Lab Results   Component Value Date     02/16/2023     11/29/2021     01/16/2020     Lab Results   Component Value Date    LDL  02/16/2023      Comment:      Cannot estimate LDL when triglyceride exceeds 400 mg/dL     Direct  "Measure HDL   Date Value Ref Range Status   02/16/2023 36 (L) >=40 mg/dL Final   ]  GFR Estimate   Date Value Ref Range Status   02/16/2023 86 >60 mL/min/1.73m2 Final     Comment:     eGFR calculated using 2021 CKD-EPI equation.   01/16/2020 >90 >60 mL/min/[1.73_m2] Final     Comment:     Non  GFR Calc  Starting 12/18/2018, serum creatinine based estimated GFR (eGFR) will be   calculated using the Chronic Kidney Disease Epidemiology Collaboration   (CKD-EPI) equation.       GFR Estimate If Black   Date Value Ref Range Status   01/16/2020 >90 >60 mL/min/[1.73_m2] Final     Comment:      GFR Calc  Starting 12/18/2018, serum creatinine based estimated GFR (eGFR) will be   calculated using the Chronic Kidney Disease Epidemiology Collaboration   (CKD-EPI) equation.       Lab Results   Component Value Date    CR 1.13 02/16/2023    CR 1.05 01/16/2020     No results found for: MICROALBUMIN    Healthy Eating:  Healthy Eating Assessed Today: Yes  Cultural/Anglican diet restrictions?: No  Meal planning/habits: None  How many times a week on average do you eat food made away from home (restaurant/take-out)?: 2  Meals include: Breakfast, Lunch, Dinner  Breakfast: 9 AM: yogurt with granola or skips  Lunch:  PM: couple slices of homemade pizza, water  Dinner: 6 PM: black bean soup with tortilla, water  Snacks: at work- possibly popcorn or nuts, after work- fries, \"bad habit of snacking before bed- ice cream and cookies  Other: typically doesn't drink sweetened beverages  Beverages: Water, Tea, Diet soda (no alcohol use anymore)  Has patient met with a dietitian in the past?: No    Being Active:  Being Active Assessed Today: Yes  Exercise:: Yes (swims and weight trains)  Days per week of moderate to strenuous exercise (like a brisk walk): 3  On average, minutes per day of exercise at this level: 120  How intense was your typical exercise? : Heavy (like jogging or swimming  Exercise Minutes " per Week: 360  Barrier to exercise: Time    Monitoring:  Monitoring Assessed Today: Yes  Did patient bring glucose meter to appointment? : No    He is not testing his BG.     Taking Medications:  Diabetes Medication(s)     Biguanides       metFORMIN (GLUCOPHAGE) 500 MG tablet    Take 1 tablet (500 mg) by mouth daily (with dinner) for 14 days, THEN 1 tablet (500 mg) 2 times daily (with meals).          Taking Medication Assessed Today: Yes  Current Treatments: Oral Medication (taken by mouth)  Problems taking diabetes medications regularly?: Yes (He has not started)    Problem Solving:  Problem Solving Assessed Today: Yes  Is the patient at risk for hypoglycemia?: No  Is the patient at risk for DKA?: No    Reducing Risks:  Reducing Risks Assessed Today: No    Healthy Coping:  Healthy Coping Assessed Today: Yes  Emotional response to diabetes: Ready to learn  Stage of change: ACTION (Actively working towards change)  Patient Activation Measure Survey Score:  No flowsheet data found.    Care Plan and Education Provided:  Care Plan: Diabetes   Updates made by Simi Elizabeth since 3/28/2023 12:00 AM      Problem: HbA1C Not In Goal       Goal: Establish Regular Follow-Ups with PCP       Task: Discuss with PCP the recommended timing for patient's next follow up visit(s)    Responsible User: Simi Elizabeth      Task: Discuss schedule for PCP visits with patient    Responsible User: Simi Elizabeth      Goal: Get HbA1C Level in Goal       Task: Educate patient on diabetes education self-management topics    Responsible User: Simi Elizabeth      Task: Educate patient on benefits of regular glucose monitoring    Responsible User: Simi Elizabeth      Task: Refer patient to appropriate extended care team member, as needed (Medication Therapy Management, Behavioral Health, Physical Therapy, etc.)    Responsible User: Simi Elizabeth      Task: Discuss diabetes treatment plan with patient    Responsible User: Glenn  Simi PARIS      Problem: Diabetes Self-Management Education Needed to Optimize Self-Care Behaviors       Goal: Understand diabetes pathophysiology and disease progression       Task: Provide education on diabetes pathophysiology and disease progression specfic to patient's diabetes type Completed 3/28/2023   Responsible User: Simi Elizabeth      Goal: Healthy Eating - follow a healthy eating pattern for diabetes       Task: Provide education on portion control and consistency in amount, composition and timing of food intake Completed 3/28/2023   Responsible User: Simi Elizabeth      Task: Provide education on managing carbohydrate intake (carbohydrate counting, plate planning method, etc.) Completed 3/28/2023   Responsible User: Simi Elizabeth      Task: Provide education on weight management    Responsible User: Simi Elizabeth      Task: Provide education on heart healthy eating    Responsible User: Simi Elizabeth      Task: Provide education on eating out    Responsible User: Simi Elizabeth      Task: Develop individualized healthy eating plan with patient    Responsible User: Simi Elizabeth      Goal: Being Active - get regular physical activity, working up to at least 150 minutes per week       Task: Provide education on relationship of activity to glucose and precautions to take if at risk for low glucose Completed 3/28/2023   Responsible User: Simi Elizabeth      Task: Discuss barriers to physical activity with patient Completed 3/28/2023   Responsible User: Simi Elizabeth      Task: Develop physical activity plan with patient    Responsible User: Siim Elizabeth      Task: Explore community resources including walking groups, assistance programs, and home videos    Responsible User: Simi Elizabeth      Goal: Monitoring - monitor glucose and ketones as directed    Start Date: 3/28/2023   This Visit's Progress: 0%   Note:    Start testing blood sugar two times a day     Task: Provide  education on blood glucose monitoring (purpose, proper technique, frequency, glucose targets, interpreting results, when to use glucose control solution, sharps disposal) Completed 3/28/2023   Responsible User: Simi Elizabeth      Task: Provide education on continuous glucose monitoring (sensor placement, use of anthony or /reader, understanding glucose trends, alerts and alarms, differences between sensor glucose and blood glucose)    Responsible User: Simi Elizabeth      Task: Provide education on ketone monitoring (when to monitor, frequency, etc.)    Responsible User: Simi Elizabeth      Goal: Taking Medication - patient is consistently taking medications as directed       Task: Provide education on action of prescribed medication, including when to take and possible side effects Completed 3/28/2023   Responsible User: Simi Elizabeth      Task: Provide education on insulin and injectable diabetes medications, including administration, storage, site selection and rotation for injection sites    Responsible User: Simi Elizabeth      Task: Discuss barriers to medication adherence with patient and provide management technique ideas as appropriate    Responsible User: Simi Elizabeth      Task: Provide education on frequency and refill details of medications    Responsible User: Simi Elizabeth      Goal: Problem Solving - know how to prevent and manage short-term diabetes complications       Task: Provide education on high blood glucose - causes, signs/symptoms, prevention and treatment Completed 3/28/2023   Responsible User: Simi Elizabeth      Task: Provide education on low blood glucose - causes, signs/symptoms, prevention, treatment, carrying a carbohydrate source at all times, and medical identification    Responsible User: Simi Elizabeth      Task: Provide education on safe travel with diabetes    Responsible User: Simi Elizabeth      Task: Provide education on how to care for diabetes on  sick days    Responsible User: Simi Elizabeth      Task: Provide education on when to call a health care provider    Responsible User: Simi Elizabeth      Goal: Reducing Risks - know how to prevent and treat long-term diabetes complications       Task: Provide education on major complications of diabetes, prevention, early diagnostic measures and treatment of complications Completed 3/28/2023   Responsible User: Simi Elizabeth      Task: Provide education on recommended care for dental, eye and foot health    Responsible User: Simi Elizabeth      Task: Provide education on Hemoglobin A1c - goals and relationship to blood glucose levels    Responsible User: Simi Elizabeth      Task: Provide education on recommendations for heart health - lipid levels and goals, blood pressure and goals, and aspirin therapy, if indicated    Responsible User: Simi Elizabeth      Task: Provide education on tobacco cessation    Responsible User: Simi Elizabeth      Goal: Healthy Coping - use available resources to cope with the challenges of managing diabetes       Task: Discuss recognizing feelings about having diabetes    Responsible User: Simi Elizabeth      Task: Provide education on the benefits of making appropriate lifestyle changes Completed 3/28/2023   Responsible User: Simi Elizabeth      Task: Provide education on benefits of utilizing support systems    Responsible User: Simi Elizabeth      Task: Discuss methods for coping with stress    Responsible User: Simi Elizabeth      Task: Provide education on when to seek professional counseling    Responsible User: Simi Elizabeth RDN, LD, [facility]    Time Spent: 60 minutes  Encounter Type: Individual    Any diabetes medication dose changes were made via the CDE Protocol per the patient's referring provider. A copy of this encounter was shared with the provider.

## 2023-04-26 DIAGNOSIS — I10 BENIGN ESSENTIAL HYPERTENSION: ICD-10-CM

## 2023-04-27 RX ORDER — LISINOPRIL 10 MG/1
10 TABLET ORAL AT BEDTIME
Qty: 90 TABLET | Refills: 0 | Status: SHIPPED | OUTPATIENT
Start: 2023-04-27 | End: 2023-07-28

## 2023-05-18 ENCOUNTER — VIRTUAL VISIT (OUTPATIENT)
Dept: EDUCATION SERVICES | Facility: CLINIC | Age: 38
End: 2023-05-18
Payer: COMMERCIAL

## 2023-05-18 DIAGNOSIS — E11.65 TYPE 2 DIABETES MELLITUS WITH HYPERGLYCEMIA, WITHOUT LONG-TERM CURRENT USE OF INSULIN (H): Primary | ICD-10-CM

## 2023-05-18 PROCEDURE — G0108 DIAB MANAGE TRN  PER INDIV: HCPCS | Mod: 93

## 2023-05-18 RX ORDER — BLOOD-GLUCOSE SENSOR
1 EACH MISCELLANEOUS
Qty: 2 EACH | Refills: 5 | Status: SHIPPED | OUTPATIENT
Start: 2023-05-18

## 2023-05-18 NOTE — LETTER
"    5/18/2023         RE: Jorge Madrigal  [address] [facility] [address]        Dear Colleague,    Thank you for referring your patient, Jorge Madrigal, to the [facility]. Please see a copy of my visit note below.    Diabetes Self-Management Education & Support    Presents for: Follow-up    Type of Service: Telephone Visit    Originating Location (Patient Location): Home  Distant Location (Provider Location): [facility]  Mode of Communication:  Telephone    Telephone Visit Start Time: 1215 PM  Telephone Visit End Time (telephone visit stop time): 1245    How would patient like to obtain AVS? MyChart    ASSESSMENT:  Jorge states he has been testing his BG but knows he needs to increase testing time as well. States he has been taking 2 of the Metformin pills and all fasting BG have been in the \"200 range.\"  He has been watching his portions and continues to exercise. Positive reinforcement given.     He is interested in learning more about CGM sensors.    Patient's most recent   Lab Results   Component Value Date    A1C 7.6 02/16/2023     is not meeting goal of <7.0    Diabetes knowledge and skills assessment:   Patient is knowledgeable in diabetes management concepts related to: Being Active and Taking Medication    Continue education with the following diabetes management concepts: Healthy Eating, Monitoring, Taking Medication and Problem Solving    Based on learning assessment above, most appropriate setting for further diabetes education would be: Individual setting.      PLAN    Based on reported BG data, recommend adding third Metformin pill to evening meal for 5 days. Then add 4th pill to breakfast meal. Goal 1000 mg Metformin, twice per day    Discussed Mary 3 sensor and sent prescription to pharmacy. All Mary information sent in AVS.     Blood sugar monitoring: Reviewed testing times and targets  Check blood sugars fasting, before largest meal of the day " and 2 hours after.   Fasting and before meal targets:  mg/dL  2 hours after start of meal: <180 mg/dL  Keep a blood glucose record for next visit.    Meal Plan Recommendation:   Use portion control and plate planning method.  Carbohydrates to aim for at meals: 45-60 grams and snacks: 0-30 grams.  Use [employer] for recipe and meal planning ideas  Use [employer] anthony for carbohydrate reference.    Exercise / activity plan:   Continue current exercise regimen, increase as able.     Topics to cover at upcoming visits: Monitoring, Taking Medication, Problem Solving and Reducing Risks    Follow-up: 6/26/23    See Care Plan for co-developed, patient-state behavior change goals.  AVS provided for patient today.    Education Materials Provided:  No new materials provided today    SUBJECTIVE/OBJECTIVE:  Presents for: Follow-up  Accompanied by: Self  Diabetes education in the past 24mo: No  Focus of Visit: Monitoring, Taking Medication, Being Active, Healthy Eating  Diabetes type: Type 2  Disease course: Stable  How confident are you filling out medical forms by yourself:: Extremely  Transportation concerns: No  Difficulty affording diabetes medication?: No  Difficulty affording diabetes testing supplies?: No  Other concerns:: None  Cultural Influences/Ethnic Background:  Not  or     Diabetes Symptoms & Complications:  Fatigue: No  Polydipsia: No  Polyuria: No  Visual change: No  Slow healing wounds: No  Symptom course: Stable  Weight trend: Increasing  Complications assessed today?: No    Patient Problem List and Family Medical History reviewed for relevant medical history, current medical status, and diabetes risk factors.    Vitals:  There were no vitals taken for this visit.  Estimated body mass index is 39.58 kg/m  as calculated from the following:    Height as of 2/16/23: 1.829 m (6').    Weight as of 2/16/23: 132.4 kg (291 lb 12.8 oz).   Last 3 BP:   BP Readings from Last 3 Encounters:  "  02/16/23 (!) 142/90   08/11/22 (!) 150/90   06/15/22 (!) 140/88       History   Smoking Status     Never   Smokeless Tobacco     Never       Labs:  Lab Results   Component Value Date    A1C 7.6 02/16/2023     Lab Results   Component Value Date     02/16/2023     11/29/2021     01/16/2020     Lab Results   Component Value Date    LDL  02/16/2023      Comment:      Cannot estimate LDL when triglyceride exceeds 400 mg/dL     Direct Measure HDL   Date Value Ref Range Status   02/16/2023 36 (L) >=40 mg/dL Final   ]  GFR Estimate   Date Value Ref Range Status   02/16/2023 86 >60 mL/min/1.73m2 Final     Comment:     eGFR calculated using 2021 CKD-EPI equation.   01/16/2020 >90 >60 mL/min/[1.73_m2] Final     Comment:     Non  GFR Calc  Starting 12/18/2018, serum creatinine based estimated GFR (eGFR) will be   calculated using the Chronic Kidney Disease Epidemiology Collaboration   (CKD-EPI) equation.       GFR Estimate If Black   Date Value Ref Range Status   01/16/2020 >90 >60 mL/min/[1.73_m2] Final     Comment:      GFR Calc  Starting 12/18/2018, serum creatinine based estimated GFR (eGFR) will be   calculated using the Chronic Kidney Disease Epidemiology Collaboration   (CKD-EPI) equation.       Lab Results   Component Value Date    CR 1.13 02/16/2023    CR 1.05 01/16/2020     No results found for: MICROALBUMIN    Healthy Eating:  Healthy Eating Assessed Today: Yes  Cultural/Druze diet restrictions?: No  Meal planning/habits: None  How many times a week on average do you eat food made away from home (restaurant/take-out)?: 2  Meals include: Breakfast, Lunch, Dinner  Breakfast: 9 AM: yogurt with granola or skips  Lunch:  PM: couple slices of homemade pizza, water  Dinner: 6 PM: black bean soup with tortilla, water  Snacks: at work- possibly popcorn or nuts, after work- fries, \"bad habit of snacking before bed- ice cream and cookies  Other: typically doesn't " drink sweetened beverages  Beverages: Water, Tea, Diet soda (no alcohol use anymore)  Has patient met with a dietitian in the past?: No    Being Active:  Being Active Assessed Today: Yes  Exercise: Yes (swims and weight trains)  Days per week of moderate to strenuous exercise (like a brisk walk): 3  On average, minutes per day of exercise at this level: 120  How intense was your typical exercise? : Heavy (like jogging or swimming  Exercise Minutes per Week: 360  Barrier to exercise: Time    Monitoring:  Monitoring Assessed Today: Yes  Did patient bring glucose meter to appointment? : No  Blood Glucose Meter: Accu-chek  Times checking blood sugar at home (number): 1  Times checking blood sugar at home (per): Day    Taking Medications:  Diabetes Medication(s)       Biguanides       metFORMIN (GLUCOPHAGE) 500 MG tablet    Take 1 tablet (500 mg) by mouth daily (with dinner) for 14 days, THEN 1 tablet (500 mg) 2 times daily (with meals).          Taking Medication Assessed Today: Yes  Current Treatments: Oral Medication (taken by mouth), Diet  Problems taking diabetes medications regularly?: No (He has not started)  Diabetes medication side effects?: No    Problem Solving:  Problem Solving Assessed Today: Yes  Is the patient at risk for hypoglycemia?: No  Is the patient at risk for DKA?: No    Reducing Risks:  Reducing Risks Assessed Today: No    Healthy Coping:  Healthy Coping Assessed Today: Yes  Emotional response to diabetes: Ready to learn  Stage of change: ACTION (Actively working towards change)  Support resources: [employer]  Patient Activation Measure Survey Score:       View : No data to display.              Care Plan and Education Provided:  Care Plan: Diabetes   Updates made by Simi Elizabeth since 5/18/2023 12:00 AM        Problem: Diabetes Self-Management Education Needed to Optimize Self-Care Behaviors         Goal: Monitoring - monitor glucose and ketones as directed    Start Date: 3/28/2023   This  Visit's Progress: 50%   Recent Progress: 0%   Note:    Start testing blood sugar two times a day       Task: Provide education on continuous glucose monitoring (sensor placement, use of anthony or /reader, understanding glucose trends, alerts and alarms, differences between sensor glucose and blood glucose) Completed 5/18/2023   Responsible User: Simi Elizabeth        Goal: Reducing Risks - know how to prevent and treat long-term diabetes complications         Task: Provide education on Hemoglobin A1c - goals and relationship to blood glucose levels Completed 5/18/2023   Responsible User: Simi Elizabeth RDN, LD, [facility]    Time Spent: 30 minutes  Encounter Type: Individual    Any diabetes medication dose changes were made via the CDE Protocol per the patient's referring provider. A copy of this encounter was shared with the provider.

## 2023-05-18 NOTE — PATIENT INSTRUCTIONS
Jona Patel,  Below are reminders from the visit.    Add third Metformin pill to evening meal for 5 days then add 4th pill to breakfast meal. Goal 1000 mg Metformin, twice per day    Blood sugar monitoring:  Check blood sugars fasting, before largest meal of the day and 2 hours after.   Fasting and before meal targets:  mg/dL  2 hours after start of meal: <180 mg/dL  Keep a blood glucose record for next visit.    Meal Plan Recommendation:   Use portion control and plate planning method.  Carbohydrates to aim for at meals: 45-60 grams and snacks: 0-30 grams.  Use diabetes[employer].org for recipe and meal planning ideas  Use [employer] anthony for carbohydrate reference.    Exercise / activity plan:   Continue current exercise regimen, increase as able.     Mary 2 & 3 Instructions:     1.  The first hour after you place the sensor is the warm up period (black out period).  You will need to carry your blood glucose meter and check blood glucose during this time.     2.  Check blood sugar if feeling symptoms of hypoglycemia but meter is not displaying a low number- may be some variability between sensor and meter at times.     3.  Change sensor every 14 days.     4.  Read/scan blood sugars every 8 hours to ensure entirety of data is available and keep phone/reader near you. (MARY 2 ONLY)    Tip: Scan before each meal and at bedtime.     5.  Watch trend arrows- will let you know if blood sugars are rising, falling or stable at the time you check.     6.  Mary 2 & 3 have alarms. You can adjust both the high and low blood glucose alarm (when they will go off) or turn off high blood glucose alarm if alarming too much.     You cannot turn off the critical low blood glucose alarm, set at 55 mg/dL.    7.  You can shower, bathe, and swim with sensor on.     8.  Remove the sensor if you need to have an MRI or CT scan.     9.  Do not cover the sensor with extra adhesive (the small hole in the center of the sensor must remain  uncovered), unless it is made for the Mary.  If you have trouble with sensor falling off, these are approved products to help with sensor adhesion: [employer] Skin Tac, SKIN-PREP Protective Barrier Wipe and Mastisol Liquid Adhesive     10.  Check the dose if you take a multivitamin or Vitamin C (ascorbic acid). You want to limit daily intake of ascorbic acid to 500 mg/day or less, or it may falsely raise sensor glucose readings. This is more of a concern if you are on insulin or medication that can cause low blood glucose as you may miss a severe low glucose event.     Support:   If you have any trouble with use or if the sensor falls off early, call the customer service number for Mary located on the sensor's box. They can often help troubleshoot or replace sensor if needed.     [employer] Customer Service: [phone]     Discount Programs are available through [employer]:     [employer] Program ([email or web address]://[email or web address]-en/[email or web address])   To save on sensors, if told cost is more than $75: call [employer] line at [phone]      [employer] Sharing Instructions  Connecting to the Clinic on the Mary Chan:     Step 1: Open the Settings Menu in your Mary Chan.       -Click the three blue lines to open the Settings Menu.     Step 2: Click Connected Apps.     Step 3: Click Connect or Manage next to [employer].     Step 4: Clinic Connect to a Practice:     Step 5: Link to a practice:   -To connect to [facility] enter the following Practice ID: [id number] in the field provided and click the Add button. You should now see your healthcare practice or clinic name appear in your Linked Practices list. This means you have successfully linked accounts and your healthcare professional now has access to all your glucose data.     [employer] Customer Service: [phone]      Simi Elizabeth RDN, DELANEY, [facility]  Diabetes Education Triage Line: [phone]  Diabetes Education Appointment [phone]

## 2023-05-18 NOTE — PROGRESS NOTES
"Diabetes Self-Management Education & Support    Presents for: Follow-up    Type of Service: Telephone Visit    Originating Location (Patient Location): Home  Distant Location (Provider Location): [facility]  Mode of Communication:  Telephone    Telephone Visit Start Time: 1215 PM  Telephone Visit End Time (telephone visit stop time): 1245    How would patient like to obtain AVS? MyChart    ASSESSMENT:  Jorge states he has been testing his BG but knows he needs to increase testing time as well. States he has been taking 2 of the Metformin pills and all fasting BG have been in the \"200 range.\"  He has been watching his portions and continues to exercise. Positive reinforcement given.     He is interested in learning more about CGM sensors.    Patient's most recent   Lab Results   Component Value Date    A1C 7.6 02/16/2023     is not meeting goal of <7.0    Diabetes knowledge and skills assessment:   Patient is knowledgeable in diabetes management concepts related to: Being Active and Taking Medication    Continue education with the following diabetes management concepts: Healthy Eating, Monitoring, Taking Medication and Problem Solving    Based on learning assessment above, most appropriate setting for further diabetes education would be: Individual setting.      PLAN    Based on reported BG data, recommend adding third Metformin pill to evening meal for 5 days. Then add 4th pill to breakfast meal. Goal 1000 mg Metformin, twice per day    Discussed Mary 3 sensor and sent prescription to pharmacy. All Mary information sent in AVS.     Blood sugar monitoring: Reviewed testing times and targets  Check blood sugars fasting, before largest meal of the day and 2 hours after.   Fasting and before meal targets:  mg/dL  2 hours after start of meal: <180 mg/dL  Keep a blood glucose record for next visit.    Meal Plan Recommendation:   Use portion control and plate planning method.  Carbohydrates " to aim for at meals: 45-60 grams and snacks: 0-30 grams.  Use diabetes[employer].org for recipe and meal planning ideas  Use [employer] anthony for carbohydrate reference.    Exercise / activity plan:   Continue current exercise regimen, increase as able.     Topics to cover at upcoming visits: Monitoring, Taking Medication, Problem Solving and Reducing Risks    Follow-up: 6/26/23    See Care Plan for co-developed, patient-state behavior change goals.  AVS provided for patient today.    Education Materials Provided:  No new materials provided today    SUBJECTIVE/OBJECTIVE:  Presents for: Follow-up  Accompanied by: Self  Diabetes education in the past 24mo: No  Focus of Visit: Monitoring, Taking Medication, Being Active, Healthy Eating  Diabetes type: Type 2  Disease course: Stable  How confident are you filling out medical forms by yourself:: Extremely  Transportation concerns: No  Difficulty affording diabetes medication?: No  Difficulty affording diabetes testing supplies?: No  Other concerns:: None  Cultural Influences/Ethnic Background:  Not  or     Diabetes Symptoms & Complications:  Fatigue: No  Polydipsia: No  Polyuria: No  Visual change: No  Slow healing wounds: No  Symptom course: Stable  Weight trend: Increasing  Complications assessed today?: No    Patient Problem List and Family Medical History reviewed for relevant medical history, current medical status, and diabetes risk factors.    Vitals:  There were no vitals taken for this visit.  Estimated body mass index is 39.58 kg/m  as calculated from the following:    Height as of 2/16/23: 1.829 m (6').    Weight as of 2/16/23: 132.4 kg (291 lb 12.8 oz).   Last 3 BP:   BP Readings from Last 3 Encounters:   02/16/23 (!) 142/90   08/11/22 (!) 150/90   06/15/22 (!) 140/88       History   Smoking Status     Never   Smokeless Tobacco     Never       Labs:  Lab Results   Component Value Date    A1C 7.6 02/16/2023     Lab Results   Component Value Date     " 02/16/2023     11/29/2021     01/16/2020     Lab Results   Component Value Date    LDL  02/16/2023      Comment:      Cannot estimate LDL when triglyceride exceeds 400 mg/dL     Direct Measure HDL   Date Value Ref Range Status   02/16/2023 36 (L) >=40 mg/dL Final   ]  GFR Estimate   Date Value Ref Range Status   02/16/2023 86 >60 mL/min/1.73m2 Final     Comment:     eGFR calculated using 2021 CKD-EPI equation.   01/16/2020 >90 >60 mL/min/[1.73_m2] Final     Comment:     Non  GFR Calc  Starting 12/18/2018, serum creatinine based estimated GFR (eGFR) will be   calculated using the Chronic Kidney Disease Epidemiology Collaboration   (CKD-EPI) equation.       GFR Estimate If Black   Date Value Ref Range Status   01/16/2020 >90 >60 mL/min/[1.73_m2] Final     Comment:      GFR Calc  Starting 12/18/2018, serum creatinine based estimated GFR (eGFR) will be   calculated using the Chronic Kidney Disease Epidemiology Collaboration   (CKD-EPI) equation.       Lab Results   Component Value Date    CR 1.13 02/16/2023    CR 1.05 01/16/2020     No results found for: MICROALBUMIN    Healthy Eating:  Healthy Eating Assessed Today: Yes  Cultural/Hinduism diet restrictions?: No  Meal planning/habits: None  How many times a week on average do you eat food made away from home (restaurant/take-out)?: 2  Meals include: Breakfast, Lunch, Dinner  Breakfast: 9 AM: yogurt with granola or skips  Lunch:  PM: couple slices of homemade pizza, water  Dinner: 6 PM: black bean soup with tortilla, water  Snacks: at work- possibly popcorn or nuts, after work- fries, \"bad habit of snacking before bed- ice cream and cookies  Other: typically doesn't drink sweetened beverages  Beverages: Water, Tea, Diet soda (no alcohol use anymore)  Has patient met with a dietitian in the past?: No    Being Active:  Being Active Assessed Today: Yes  Exercise: Yes (swims and weight trains)  Days per week of " moderate to strenuous exercise (like a brisk walk): 3  On average, minutes per day of exercise at this level: 120  How intense was your typical exercise? : Heavy (like jogging or swimming  Exercise Minutes per Week: 360  Barrier to exercise: Time    Monitoring:  Monitoring Assessed Today: Yes  Did patient bring glucose meter to appointment? : No  Blood Glucose Meter: Accu-chek  Times checking blood sugar at home (number): 1  Times checking blood sugar at home (per): Day    Taking Medications:  Diabetes Medication(s)     Biguanides       metFORMIN (GLUCOPHAGE) 500 MG tablet    Take 1 tablet (500 mg) by mouth daily (with dinner) for 14 days, THEN 1 tablet (500 mg) 2 times daily (with meals).        Taking Medication Assessed Today: Yes  Current Treatments: Oral Medication (taken by mouth), Diet  Problems taking diabetes medications regularly?: No (He has not started)  Diabetes medication side effects?: No    Problem Solving:  Problem Solving Assessed Today: Yes  Is the patient at risk for hypoglycemia?: No  Is the patient at risk for DKA?: No    Reducing Risks:  Reducing Risks Assessed Today: No    Healthy Coping:  Healthy Coping Assessed Today: Yes  Emotional response to diabetes: Ready to learn  Stage of change: ACTION (Actively working towards change)  Support resources: [employer]  Patient Activation Measure Survey Score:       View : No data to display.              Care Plan and Education Provided:  Care Plan: Diabetes   Updates made by Simi Elizabeth since 5/18/2023 12:00 AM      Problem: Diabetes Self-Management Education Needed to Optimize Self-Care Behaviors       Goal: Monitoring - monitor glucose and ketones as directed    Start Date: 3/28/2023   This Visit's Progress: 50%   Recent Progress: 0%   Note:    Start testing blood sugar two times a day     Task: Provide education on continuous glucose monitoring (sensor placement, use of anthony or /reader, understanding glucose trends, alerts and alarms,  differences between sensor glucose and blood glucose) Completed 5/18/2023   Responsible User: Simi Elizabeth      Goal: Reducing Risks - know how to prevent and treat long-term diabetes complications       Task: Provide education on Hemoglobin A1c - goals and relationship to blood glucose levels Completed 5/18/2023   Responsible User: Simi Elizabeth RDN, DELANEY, [facility]    Time Spent: 30 minutes  Encounter Type: Individual    Any diabetes medication dose changes were made via the CDE Protocol per the patient's referring provider. A copy of this encounter was shared with the provider.

## 2023-05-23 DIAGNOSIS — E11.65 TYPE 2 DIABETES MELLITUS WITH HYPERGLYCEMIA, WITHOUT LONG-TERM CURRENT USE OF INSULIN (H): ICD-10-CM

## 2023-05-23 RX ORDER — BLOOD-GLUCOSE SENSOR
1 EACH MISCELLANEOUS
Qty: 2 EACH | Refills: 5 | OUTPATIENT
Start: 2023-05-23

## 2023-06-04 ENCOUNTER — HEALTH MAINTENANCE LETTER (OUTPATIENT)
Age: 38
End: 2023-06-04

## 2023-06-07 ENCOUNTER — TELEPHONE (OUTPATIENT)
Dept: INTERNAL MEDICINE | Facility: CLINIC | Age: 38
End: 2023-06-07
Payer: COMMERCIAL

## 2023-06-07 NOTE — TELEPHONE ENCOUNTER
Central Prior Authorization Team   Phone: [phone]    PA Initiation    Medication: FREESTYLE DEDRICK 3 READER - APPROVED  Insurance Company:    Pharmacy Filling the Rx: [id number] [place], [place] - [address] AT [facility] [address] [facility]  Filling Pharmacy Phone: [phone]  Filling Pharmacy Fax: [phone]  Start Date: 6/7/2023

## 2023-06-07 NOTE — TELEPHONE ENCOUNTER
Central Prior Authorization Team   Phone: [phone]    PA Initiation    Medication: Continuous Blood Gluc Sensor (FREESTYLE DEDRICK 3 SENSOR) [facility]  Insurance Company: [employer] - Phone [phone] Fax [phone]  Pharmacy Filling the Rx: [id number] - [place], [place] - [address] AT [facility] [address]  Filling Pharmacy Phone: [phone]  Filling Pharmacy Fax: [phone]  Start Date: 6/7/2023    PRIMARY INSURANCE.      SECONDARY INSURANCE.    PA Initiation    Medication: Continuous Blood Gluc Sensor (FREESTYLE DEDRICK 3 SENSOR) [facility]  Insurance Company: [facility] - Phone [phone] Fax [phone]  Pharmacy Filling the Rx: [facility] #[id number] - [place], [place] - [address] AT [facility] [address]  Filling Pharmacy Phone: [phone]  Filling Pharmacy Fax: [phone]  Start Date: 6/7/2023

## 2023-06-07 NOTE — TELEPHONE ENCOUNTER
[facility], insurance requires [place] for coverage of this prescription.

## 2023-06-07 NOTE — TELEPHONE ENCOUNTER
Prior Authorization Approval    Authorization Effective Date: 5/7/2023  Authorization Expiration Date: 6/7/2026  Medication: FREESTYLE DEDRICK 3 READER - APPROVED  Approved Dose/Quantity:    Reference #:     Insurance Company:    Expected CoPay:       CoPay Card Available:      Foundation Assistance Needed:    Which Pharmacy is filling the prescription (Not needed for infusion/clinic administered): [id number] [place], [place] - [address] AT [facility] [address] [place] & [facility]  Pharmacy Notified: Yes  Patient Notified: Yes  **Instructed pharmacy to notify patient when script is ready to /ship.**

## 2023-06-07 NOTE — TELEPHONE ENCOUNTER
Prior Authorization Approval    Authorization Effective Date: 5/7/2023  Authorization Expiration Date: 6/7/2026  Medication: Continuous Blood Gluc Sensor (FREESTYLE DEDRICK 3 SENSOR) [facility] - APPROVED  Approved Dose/Quantity:    Reference #:     Insurance Company: [employer] [place] - Phone [phone] Fax [phone]  Expected CoPay:       CoPay Card Available:      Foundation Assistance Needed:    Which Pharmacy is filling the prescription (Not needed for infusion/clinic administered): [id number] - [place], [place] - [address] AT [facility] [address]  Pharmacy Notified: Yes  Patient Notified: Yes  **Instructed pharmacy to notify patient when script is ready to /ship.**

## 2023-06-08 ENCOUNTER — VIRTUAL VISIT (OUTPATIENT)
Dept: UROLOGY | Facility: CLINIC | Age: 38
End: 2023-06-08
Payer: COMMERCIAL

## 2023-06-08 DIAGNOSIS — Z30.09 VASECTOMY EVALUATION: Primary | ICD-10-CM

## 2023-06-08 PROCEDURE — 99203 OFFICE O/P NEW LOW 30 MIN: CPT | Mod: VID | Performed by: UROLOGY

## 2023-06-08 NOTE — PROGRESS NOTES
Jorge is a 38 year old who is being evaluated via a billable video visit.      How would you like to obtain your AVS? MyChart  If the video visit is dropped, the invitation should be resent by: Text to cell phone: [phone]  Will anyone else be joining your video visit? No              Subjective   Jorge is a 38 year old, presenting for the following health issues:  Video Visit    HPI     Patient is interested in vasectomy.  He has 2 kids.      Review of Systems   Constitutional, HEENT, cardiovascular, pulmonary, gi and gu systems are negative, except as otherwise noted.      Objective           Vitals:  No vitals were obtained today due to virtual visit.    Physical Exam   GENERAL: Healthy, alert and no distress  EYES: Eyes grossly normal to inspection.  No discharge or erythema, or obvious scleral/conjunctival abnormalities.  RESP: No audible wheeze, cough, or visible cyanosis.  No visible retractions or increased work of breathing.    SKIN: Visible skin clear. No significant rash, abnormal pigmentation or lesions.  NEURO: Cranial nerves grossly intact.  Mentation and speech appropriate for age.  PSYCH: Mentation appears normal, affect normal/bright, judgement and insight intact, normal speech and appearance well-groomed.        Discussed    That vasectomy is permanent method of birth control.    That vasectomy can fail due to recanalization of the vas even many months/years later.    That he needs 2 negative sperm checks to be considered sterile    That there are other methods that are not permanent and also that the sperm can be frozen for later use.    How the technique is performed, risks of infection, bleeding, damage to the testes vessels and testes atrophy    Long term complication such as chronic and difficult to treat testes pain and questionable increase incidence of prostate cancer    That the procedure can be done at the clinic or hospital OR        Plan:    Stop Aspirin  Will schedule Vasectomy in  the future          Video-Visit Details    Type of service:  Video Visit   Video Start Time:   Video End Time:    Originating Location (pt. Location): Home    Distant Location (provider location):  Off-site  Platform [facility] for Video Visit: [facility]

## 2023-06-16 ENCOUNTER — OFFICE VISIT (OUTPATIENT)
Dept: OTOLARYNGOLOGY | Facility: CLINIC | Age: 38
End: 2023-06-16
Payer: COMMERCIAL

## 2023-06-16 DIAGNOSIS — J34.3 NASAL TURBINATE HYPERTROPHY: ICD-10-CM

## 2023-06-16 DIAGNOSIS — J34.2 DEVIATED NASAL SEPTUM: Primary | ICD-10-CM

## 2023-06-16 PROCEDURE — 99214 OFFICE O/P EST MOD 30 MIN: CPT | Performed by: OTOLARYNGOLOGY

## 2023-06-16 NOTE — NURSING NOTE
Sino-Nasal Outcome Test (SNOT - 22)    1. Need to Blow Nose: (P) Mild or slight  2. Nasal Blockage: (P) Very mild  3. Sneezing: (P) Very mild  4. Runny Nose: (P) Very mild  5. Cough: (P) Very mild  6. Post-nasal discharge: (P) Mild or slight  7. Thick nasal discharge: (P) Mild or slight  8. Ear fullness: (P) None  9. Dizziness: (P) None  10. Ear Pain: (P) None  11. Facial pain/pressure: (P) Very mild  12. Decreased Sense of Smell/Taste: (P) Very mild  13. Difficulty falling asleep: (P) Very mild  14. Wake up at night: (P) Mild or slight  15. Lack of a good night's sleep: (P) Mild or slight  16. Wake up tired: (P) Mild or slight  17. Fatigue: (P) Very mild  18. Reduced Productivity: (P) Very mild  19. Reduced Concentration: (P) Very mild  20. Frustrated/restless/irritable: (P) Very mild  21. Sad: (P) None  22. Embarrassed: (P) None    Total Score: (P) 23    COPYRIGHT 1996. [facility] [place]    Maricruz Rosenberg MA on 6/16/2023 at 8:24 AM

## 2023-06-16 NOTE — PATIENT INSTRUCTIONS
Possible risks from nasal septoplasty:     Continued symptoms, such as nasal obstruction.   Excessive bleeding.   A change in the shape of your nose.   A hole in the septum.   Decrease or loss of smell (temporary or permanent)  Accumulation of blood in the septum that has to be drained.   Temporary numbness in the upper gum, teeth or nose.

## 2023-06-16 NOTE — PROGRESS NOTES
CHIEF COMPLAINT:  Patient presents with:  Follow Up: Loud Snoring, nasal congestion, CT review, nasal spray made nose feel extremely dry only uses occasionally, SNOT total score 28         HISTORY OF PRESENT ILLNESS    Jorge was seen in follow up after previous 12/2/2022 visit for CT review.       Sino-Nasal Outcome Test (SNOT - 22)    1. Need to Blow Nose: (P) Mild or slight  2. Nasal Blockage: (P) Very mild  3. Sneezing: (P) Very mild  4. Runny Nose: (P) Very mild  5. Cough: (P) Very mild  6. Post-nasal discharge: (P) Mild or slight  7. Thick nasal discharge: (P) Mild or slight  8. Ear fullness: (P) None  9. Dizziness: (P) None  10. Ear Pain: (P) None  11. Facial pain/pressure: (P) Very mild  12. Decreased Sense of Smell/Taste: (P) Very mild  13. Difficulty falling asleep: (P) Very mild  14. Wake up at night: (P) Mild or slight  15. Lack of a good night's sleep: (P) Mild or slight  16. Wake up tired: (P) Mild or slight  17. Fatigue: (P) Very mild  18. Reduced Productivity: (P) Very mild  19. Reduced Concentration: (P) Very mild  20. Frustrated/restless/irritable: (P) Very mild  21. Sad: (P) None  22. Embarrassed: (P) None    Total Score: (P) 23    COPYRIGHT 1996. [facility] [place] . [facility]    REVIEW OF SYSTEMS    Review of Systems: a 10-system review is reviewed at this encounter.  See scanned document.       No Known Allergies        PHYSICAL EXAM:        HEAD: Normal appearance and symmetry:  No cutaneous lesions.        NOSE:    Dorsum:   Straight  Septum:  Deviated right with bony spur  Turbinates:2-3+        ORAL CAVITY/OROPHARYNX:    Lips:  Normal.     NECK:  Trachea:  midline     NEURO:   Alert and Oriented    GAIT AND STATION:  normal     RESPIRATORY:   Symmetry and Respiratory effort    PSYCH:   normal mood and affect    SKIN:  warm and dry         IMPRESSION:   Encounter Diagnoses   Name Primary?     Deviated nasal septum Yes     Nasal turbinate hypertrophy              RECOMMENDATIONS:    Orders Placed This Encounter   Procedures     Case Request: SEPTOPLASTY, NOSE, WITH TURBINOPLASTY      No orders of the defined types were placed in this encounter.      Recommend BA septoplasty and conservative turbinate reduction (1 pass each turbinate using PTR wand).    Possible risks from nasal septoplasty:       Continued symptoms, such as nasal obstruction.     Excessive bleeding.     A change in the shape of your nose.     A hole in the septum.     Decrease or loss of smell (temporary or permanent)    Accumulation of blood in the septum that has to be drained.     Temporary numbness in the upper gum, teeth or nose.

## 2023-06-16 NOTE — LETTER
6/16/2023         RE: Jorge Madrigal  [address] [facility] [address]        Dear Colleague,    Thank you for referring your patient, Jorge Madrigal, to the [facility]. Please see a copy of my visit note below.    CHIEF COMPLAINT:  Patient presents with:  Follow Up: Loud Snoring, nasal congestion, CT review, nasal spray made nose feel extremely dry only uses occasionally, SNOT total score 28         HISTORY OF PRESENT ILLNESS    Jorge was seen in follow up after previous 12/2/2022 visit for CT review.       Sino-Nasal Outcome Test (SNOT - 22)    1. Need to Blow Nose: (P) Mild or slight  2. Nasal Blockage: (P) Very mild  3. Sneezing: (P) Very mild  4. Runny Nose: (P) Very mild  5. Cough: (P) Very mild  6. Post-nasal discharge: (P) Mild or slight  7. Thick nasal discharge: (P) Mild or slight  8. Ear fullness: (P) None  9. Dizziness: (P) None  10. Ear Pain: (P) None  11. Facial pain/pressure: (P) Very mild  12. Decreased Sense of Smell/Taste: (P) Very mild  13. Difficulty falling asleep: (P) Very mild  14. Wake up at night: (P) Mild or slight  15. Lack of a good night's sleep: (P) Mild or slight  16. Wake up tired: (P) Mild or slight  17. Fatigue: (P) Very mild  18. Reduced Productivity: (P) Very mild  19. Reduced Concentration: (P) Very mild  20. Frustrated/restless/irritable: (P) Very mild  21. Sad: (P) None  22. Embarrassed: (P) None    Total Score: (P) 23    COPYRIGHT 1996. [facility] [place]    REVIEW OF SYSTEMS    Review of Systems: a 10-system review is reviewed at this encounter.  See scanned document.       No Known Allergies        PHYSICAL EXAM:        HEAD: Normal appearance and symmetry:  No cutaneous lesions.        NOSE:    Dorsum:   Straight  Septum:  Deviated right with bony spur  Turbinates:2-3+        ORAL CAVITY/OROPHARYNX:    Lips:  Normal.     NECK:  Trachea:  midline     NEURO:   Alert and Oriented    GAIT AND STATION:  normal     RESPIRATORY:    Symmetry and Respiratory effort    PSYCH:   normal mood and affect    SKIN:  warm and dry         IMPRESSION:   Encounter Diagnoses   Name Primary?     Deviated nasal septum Yes     Nasal turbinate hypertrophy             RECOMMENDATIONS:    Orders Placed This Encounter   Procedures     Case Request: SEPTOPLASTY, NOSE, WITH TURBINOPLASTY      No orders of the defined types were placed in this encounter.      Recommend BA septoplasty and conservative turbinate reduction (1 pass each turbinate using PTR wand).    Possible risks from nasal septoplasty:       Continued symptoms, such as nasal obstruction.     Excessive bleeding.     A change in the shape of your nose.     A hole in the septum.     Decrease or loss of smell (temporary or permanent)    Accumulation of blood in the septum that has to be drained.     Temporary numbness in the upper gum, teeth or nose.        Again, thank you for allowing me to participate in the care of your patient.        Sincerely,        Joseph Juan MD

## 2023-06-29 ENCOUNTER — DOCUMENTATION ONLY (OUTPATIENT)
Dept: OTOLARYNGOLOGY | Facility: CLINIC | Age: 38
End: 2023-06-29
Payer: COMMERCIAL

## 2023-06-29 ENCOUNTER — TELEPHONE (OUTPATIENT)
Dept: OTOLARYNGOLOGY | Facility: CLINIC | Age: 38
End: 2023-06-29
Payer: COMMERCIAL

## 2023-06-29 NOTE — PROGRESS NOTES
Surgical Prior-Auth (Pre-Scheduling)  DOS: TBD   Facility: [facility]   Insurance: [facility]   Plan Exclusion? No   Case # No PA NEEDED   CPT: 92481 Description: Unlisted Code Nasal Procedure   CPT: 75508 Description: Septoplasty   CPT: 34325 Description: Turbinoplasty       Clinicals Provided:    Order:    Visit Notes:    Results:    Photos:    Other:    Submitted Via: Online CPT Code Verification Tool   Date Submitted: 6/29/2023       Follow-up Phone# Aparna Hyde   : [phone]       Surgeon Name: Joseph Delong NPI: [id number]

## 2023-06-29 NOTE — LETTER
We've received instruction to get you scheduled for surgery with Dr Juan. We have that arranged as follows:     Pre-op Physical:  Call your primary clinic to schedule.    Surgery Date: 9/28/2023     Location: [place] [address]    Approximate Arrival Time: 6:00 am  (Unless instructed differently by the pre-op call nurse)     Post op Appointment: 10/20/2023 at  9:20 am with  Dr. Juan  [facility] [address] [place] [address] [address], [place] [address].    Prep Tasks and Info:     A pre-op physical with your primary care doctor is required before surgery. This must be 10-30 days before surgery.  Since it required by anesthesia, your surgery will be cancelled if it's not done. Call your clinic asap to get this scheduled.    Review your medications with your primary care or prescribing physician; they will advise you which meds to stop and when, and when you can resume taking.  Certain medications like blood thinners need to be stopped in advance of surgery to proceed safely.    Please shower the evening before and morning of surgery with Hibiclens soap.  This can be found at your local pharmacy.      Fasting instructions will be provided by the pre-op nurse who will call you 1-3 days before surgery.  Typically we advise normal food up to 8 hours before you arrive for surgery. Clear liquids only from then until 2 hours before you arrive surgery then nothing at all by mouth.  The nurse will review your specific instructions with you at the call.     Smoking impacts your body's ability to heal properly.  If you are a smoker, we strongly urge you to stop smoking. Plastic surgery patients are required to be nicotine free for at least 8 weeks before surgery.     You will need an adult to drive you home and stay with you 24 hours after surgery. Public transportation or Medical Van Services are not permitted.    Visitor  restrictions are subject to change, please verify with the pre-op nurse how many people may accompany you when they call.    We always encourage you to notify your insurance any time you have medical tests or procedures scheduled including surgery. The number is usually right on the back of your insurance card. Please call [facility] Cost of Care at [phone]  if you'd like a surgery quote.       Call our office if you have any questions! Thank you!

## 2023-06-30 DIAGNOSIS — J45.20 MILD INTERMITTENT ASTHMA, UNSPECIFIED WHETHER COMPLICATED: ICD-10-CM

## 2023-06-30 RX ORDER — MONTELUKAST SODIUM 10 MG/1
TABLET ORAL
Qty: 90 TABLET | Refills: 0 | Status: SHIPPED | OUTPATIENT
Start: 2023-06-30 | End: 2024-01-04

## 2023-07-06 NOTE — TELEPHONE ENCOUNTER
Spoke with patient today regarding surgery scheduling     Went over details/instructions.    Surgery Letter sent via [employer]  (Please see LETTERS TAB in chart to retrieve a copy of this letter)

## 2023-07-11 DIAGNOSIS — E11.65 TYPE 2 DIABETES MELLITUS WITH HYPERGLYCEMIA, WITHOUT LONG-TERM CURRENT USE OF INSULIN (H): ICD-10-CM

## 2023-07-28 ENCOUNTER — LAB (OUTPATIENT)
Dept: LAB | Facility: CLINIC | Age: 38
End: 2023-07-28
Payer: COMMERCIAL

## 2023-07-28 DIAGNOSIS — I10 BENIGN ESSENTIAL HYPERTENSION: ICD-10-CM

## 2023-07-28 DIAGNOSIS — E11.65 TYPE 2 DIABETES MELLITUS WITH HYPERGLYCEMIA, WITHOUT LONG-TERM CURRENT USE OF INSULIN (H): ICD-10-CM

## 2023-07-28 DIAGNOSIS — J45.20 MILD INTERMITTENT ASTHMA, UNSPECIFIED WHETHER COMPLICATED: ICD-10-CM

## 2023-07-28 LAB
ANION GAP SERPL CALCULATED.3IONS-SCNC: 15 MMOL/L (ref 7–15)
BUN SERPL-MCNC: 10.2 MG/DL (ref 6–20)
CALCIUM SERPL-MCNC: 8.9 MG/DL (ref 8.6–10)
CHLORIDE SERPL-SCNC: 98 MMOL/L (ref 98–107)
CHOLEST SERPL-MCNC: 252 MG/DL
CREAT SERPL-MCNC: 0.9 MG/DL (ref 0.67–1.17)
CREAT UR-MCNC: 123 MG/DL
DEPRECATED HCO3 PLAS-SCNC: 22 MMOL/L (ref 22–29)
GFR SERPL CREATININE-BSD FRML MDRD: >90 ML/MIN/1.73M2
GLUCOSE SERPL-MCNC: 386 MG/DL (ref 70–99)
HBA1C MFR BLD: 11.3 % (ref 0–5.6)
HDLC SERPL-MCNC: 32 MG/DL
LDLC SERPL CALC-MCNC: ABNORMAL MG/DL
MICROALBUMIN UR-MCNC: 57.6 MG/L
MICROALBUMIN/CREAT UR: 46.83 MG/G CR (ref 0–17)
NONHDLC SERPL-MCNC: 220 MG/DL
POTASSIUM SERPL-SCNC: 3.8 MMOL/L (ref 3.4–5.3)
SODIUM SERPL-SCNC: 135 MMOL/L (ref 136–145)
TRIGL SERPL-MCNC: 837 MG/DL

## 2023-07-28 PROCEDURE — 36415 COLL VENOUS BLD VENIPUNCTURE: CPT

## 2023-07-28 PROCEDURE — 82570 ASSAY OF URINE CREATININE: CPT

## 2023-07-28 PROCEDURE — 83036 HEMOGLOBIN GLYCOSYLATED A1C: CPT

## 2023-07-28 PROCEDURE — 82043 UR ALBUMIN QUANTITATIVE: CPT

## 2023-07-28 PROCEDURE — 80048 BASIC METABOLIC PNL TOTAL CA: CPT

## 2023-07-28 PROCEDURE — 80061 LIPID PANEL: CPT

## 2023-07-31 RX ORDER — ALBUTEROL SULFATE 90 UG/1
2 AEROSOL, METERED RESPIRATORY (INHALATION) EVERY 4 HOURS PRN
Qty: 18 G | Refills: 5 | OUTPATIENT
Start: 2023-07-31

## 2023-07-31 RX ORDER — LISINOPRIL 10 MG/1
10 TABLET ORAL AT BEDTIME
Qty: 90 TABLET | Refills: 0 | Status: SHIPPED | OUTPATIENT
Start: 2023-07-31 | End: 2023-11-16

## 2023-08-08 ENCOUNTER — MYC REFILL (OUTPATIENT)
Dept: INTERNAL MEDICINE | Facility: CLINIC | Age: 38
End: 2023-08-08
Payer: COMMERCIAL

## 2023-08-08 DIAGNOSIS — E11.65 TYPE 2 DIABETES MELLITUS WITH HYPERGLYCEMIA, WITHOUT LONG-TERM CURRENT USE OF INSULIN (H): ICD-10-CM

## 2023-08-15 DIAGNOSIS — J45.20 MILD INTERMITTENT ASTHMA, UNSPECIFIED WHETHER COMPLICATED: ICD-10-CM

## 2023-08-15 RX ORDER — ALBUTEROL SULFATE 90 UG/1
2 AEROSOL, METERED RESPIRATORY (INHALATION) EVERY 4 HOURS PRN
Qty: 18 G | Refills: 5 | Status: SHIPPED | OUTPATIENT
Start: 2023-08-15 | End: 2024-05-13

## 2023-08-25 ENCOUNTER — OFFICE VISIT (OUTPATIENT)
Dept: UROLOGY | Facility: CLINIC | Age: 38
End: 2023-08-25
Payer: COMMERCIAL

## 2023-08-25 DIAGNOSIS — Z30.2 ENCOUNTER FOR STERILIZATION: Primary | ICD-10-CM

## 2023-08-25 PROCEDURE — 99000 SPECIMEN HANDLING OFFICE-LAB: CPT | Performed by: UROLOGY

## 2023-08-25 PROCEDURE — 88302 TISSUE EXAM BY PATHOLOGIST: CPT | Performed by: PATHOLOGY

## 2023-08-25 PROCEDURE — 55250 REMOVAL OF SPERM DUCT(S): CPT | Performed by: UROLOGY

## 2023-08-28 LAB
PATH REPORT.COMMENTS IMP SPEC: NORMAL
PATH REPORT.COMMENTS IMP SPEC: NORMAL
PATH REPORT.FINAL DX SPEC: NORMAL
PATH REPORT.GROSS SPEC: NORMAL
PATH REPORT.MICROSCOPIC SPEC OTHER STN: NORMAL
PATH REPORT.RELEVANT HX SPEC: NORMAL
PHOTO IMAGE: NORMAL

## 2023-08-29 ASSESSMENT — ASTHMA QUESTIONNAIRES: ACT_TOTALSCORE: 21

## 2023-08-31 ENCOUNTER — MYC MEDICAL ADVICE (OUTPATIENT)
Dept: EDUCATION SERVICES | Facility: CLINIC | Age: 38
End: 2023-08-31
Payer: COMMERCIAL

## 2023-09-01 ENCOUNTER — OFFICE VISIT (OUTPATIENT)
Dept: INTERNAL MEDICINE | Facility: CLINIC | Age: 38
End: 2023-09-01
Payer: COMMERCIAL

## 2023-09-01 VITALS
HEIGHT: 72 IN | HEART RATE: 78 BPM | OXYGEN SATURATION: 97 % | SYSTOLIC BLOOD PRESSURE: 142 MMHG | DIASTOLIC BLOOD PRESSURE: 76 MMHG | BODY MASS INDEX: 34.08 KG/M2 | TEMPERATURE: 98.2 F | RESPIRATION RATE: 16 BRPM | WEIGHT: 251.6 LBS

## 2023-09-01 DIAGNOSIS — F33.42 RECURRENT MAJOR DEPRESSIVE DISORDER, IN FULL REMISSION (H): ICD-10-CM

## 2023-09-01 DIAGNOSIS — Z79.4 TYPE 2 DIABETES MELLITUS WITH HYPERGLYCEMIA, WITH LONG-TERM CURRENT USE OF INSULIN (H): Primary | ICD-10-CM

## 2023-09-01 DIAGNOSIS — E11.65 TYPE 2 DIABETES MELLITUS WITH HYPERGLYCEMIA, WITHOUT LONG-TERM CURRENT USE OF INSULIN (H): ICD-10-CM

## 2023-09-01 DIAGNOSIS — E11.65 TYPE 2 DIABETES MELLITUS WITH HYPERGLYCEMIA, WITH LONG-TERM CURRENT USE OF INSULIN (H): Primary | ICD-10-CM

## 2023-09-01 DIAGNOSIS — F10.21 ALCOHOL DEPENDENCE IN REMISSION (H): ICD-10-CM

## 2023-09-01 PROCEDURE — 99214 OFFICE O/P EST MOD 30 MIN: CPT | Performed by: INTERNAL MEDICINE

## 2023-09-01 ASSESSMENT — PATIENT HEALTH QUESTIONNAIRE - PHQ9
SUM OF ALL RESPONSES TO PHQ QUESTIONS 1-9: 3
10. IF YOU CHECKED OFF ANY PROBLEMS, HOW DIFFICULT HAVE THESE PROBLEMS MADE IT FOR YOU TO DO YOUR WORK, TAKE CARE OF THINGS AT HOME, OR GET ALONG WITH OTHER PEOPLE: NOT DIFFICULT AT ALL
SUM OF ALL RESPONSES TO PHQ QUESTIONS 1-9: 3

## 2023-09-01 NOTE — CONFIDENTIAL NOTE
Simi,    Please see update from Jorge and my response to him. Reach out if any concerns.    Thanks!    Merna Patino RN, [facility]

## 2023-09-01 NOTE — PROGRESS NOTES
Assessment & Plan     Type 2 diabetes mellitus with hyperglycemia, with long-term current use of insulin (H)  Has upcoming surgery - we need to see his # much better before proceeding  Start insulin   - insulin glargine (LANTUS PEN) 100 UNIT/ML pen; Inject 15 Units Subcutaneous daily , titrate per instructions. (Max dose 40 u)  - insulin pen needle (31G X 5 MM) 31G X 5 MM miscellaneous; Use 1 pen needles daily or as directed.    Recurrent major depressive disorder, in full remission (H)  Cont SSRI    Alcohol dependence in remission (H)               BMI:   Estimated body mass index is 34.12 kg/m  as calculated from the following:    Height as of this encounter: 1.829 m (6').    Weight as of this encounter: 114.1 kg (251 lb 9.6 oz).           Otto Kenney MD  [facility] GERALD Patel is a 38 year old, presenting for the following health issues:  Diabetes -  Lab Results   Component Value Date    A1C 11.3 07/28/2023    A1C 7.6 02/16/2023      Despite metformin saw significant worsening in overall control     HPI             Review of Systems         Objective    BP (!) 142/76   Pulse 78   Temp 98.2  F (36.8  C) (Temporal)   Resp 16   Ht 1.829 m (6')   Wt 114.1 kg (251 lb 9.6 oz)   SpO2 97%   BMI 34.12 kg/m    Body mass index is 34.12 kg/m .  Physical Exam   GENERAL: alert, no distress, and obese  RESP: lungs clear to auscultation - no rales, rhonchi or wheezes  CV: regular rate and rhythm, normal S1 S2, no S3 or S4, no murmur, click or rub, no peripheral edema and peripheral pulses strong

## 2023-09-01 NOTE — PATIENT INSTRUCTIONS
Inform us several days prior to your surgery what your dose of insulin is  We will need to give you instructions on dosing at that time for your day of surgery    Start with 15 units of Lantus daily    Titrate based on AM glucose readings:  If AM fasting glucose is <70 decrease next dose by 2 units                           no change in Lantus dose                          121-140 increase by 2 units                          141-175 increase by 4 units                          >176 increase by 6 units    Do this every 4 days and f/u with me in 4 wks or sooner if any problems.

## 2023-09-01 NOTE — PROGRESS NOTES
[address] [address]  Phone: [phone]  Primary Provider: Wanda Kenney  Pre-op Performing Provider: WANDA KENNEY    {Provider  Link to PREOP SmartSet  Use this to apply standard patient instructions to AVS; includes medication directions, common orders, guidelines for anemia, warfarin, additional testing   :503115}  PREOPERATIVE EVALUATION:  Today's date: 9/1/2023    Jorge Madrigal is a 38 year old male who presents for a preoperative evaluation.      Surgical Information:  Surgery/Procedure: SEPTOPLASTY, NOSE, WITH TURBINOPLASTY   Surgery Location: [facility]  Surgeon: Dr. Joseph Juan  Surgery Date: 09/28/2023  Time of Surgery: 07:30am  Where patient plans to recover: At home with family  Fax number for surgical facility: Note does not need to be faxed, will be available electronically in Epic.    {2021 Provider Charting Preference for Preop :062047}    Subjective       HPI related to upcoming procedure: ***        8/29/2023    11:17 AM   Preop Questions   1. Have you ever had a heart attack or stroke? No   2. Have you ever had surgery on your heart or blood vessels, such as a stent placement, a coronary artery bypass, or surgery on an artery in your head, neck, heart, or legs? No   3. Do you have chest pain with activity? No   4. Do you have a history of  heart failure? No   5. Do you currently have a cold, bronchitis or symptoms of other infection? No   6. Do you have a cough, shortness of breath, or wheezing? No   7. Do you or anyone in your family have previous history of blood clots? No   8. Do you or does anyone in your family have a serious bleeding problem such as prolonged bleeding following surgeries or cuts? No   9. Have you ever had problems with anemia or been told to take iron pills? No   10. Have you had any abnormal blood loss such as black, tarry or bloody stools? No   11. Have you ever had a blood transfusion? No    12. Are you willing to have a blood transfusion if it is medically needed before, during, or after your surgery? Yes   13. Have you or any of your relatives ever had problems with anesthesia? No   14. Do you have sleep apnea, excessive snoring or daytime drowsiness? No   15. Do you have any artifical heart valves or other implanted medical devices like a pacemaker, defibrillator, or continuous glucose monitor? No   16. Do you have artificial joints? No   17. Are you allergic to latex? No       Health Care Directive:  Patient does not have a Health Care Directive or Living Will: {ADVANCE_DIRECTIVE_STATUS:738050}    Preoperative Review of :  {Mnpmpreport:064755}  {Review MNPMP for all patients per [employer] MNPMP Profile:370882}    {Chronic problem details (Optional) :617037}    Review of Systems  {ROS Preop Choices:722039}    Patient Active Problem List    Diagnosis Date Noted     Type 2 diabetes mellitus with hyperglycemia, without long-term current use of insulin (H) 03/03/2023     Priority: Medium     Alcohol dependence in remission (H) 11/03/2022     Priority: Medium     Acute cough 11/03/2022     Priority: Medium     Recurrent major depressive disorder, in full remission (H) 11/03/2022     Priority: Medium     Alcohol use disorder, mild, in sustained remission 05/15/2020     Priority: Medium     Morbid obesity (H) 01/04/2020     Priority: Medium     Moderate persistent asthma without complication      Priority: Medium     GERD (gastroesophageal reflux disease)      Priority: Medium     Recurrent major depressive disorder, in partial remission (H)      Priority: Medium      Past Medical History:   Diagnosis Date     Alcohol dependence in remission (H)      Asthma      Depressive disorder      GERD (gastroesophageal reflux disease)      Infection due to 2019 novel coronavirus 11/3/2022     Type 2 diabetes mellitus with hyperglycemia, without long-term current use of insulin (H) 3/3/2023     Past Surgical History:    Procedure Laterality Date     COLONOSCOPY  2015    Normal     Current Outpatient Medications   Medication Sig Dispense Refill     acetaminophen (TYLENOL) 325 MG tablet Take 325-650 mg by mouth       albuterol (ACCUNEB) 0.63 MG/3ML neb solution Take 3 mLs (0.63 mg) by nebulization every 4 hours as needed for shortness of breath or wheezing 60 mL 11     albuterol (VENTOLIN HFA) 108 (90 Base) MCG/ACT inhaler Inhale 2 puffs into the lungs every 4 hours as needed for wheezing or shortness of breath 18 g 5     azelastine (ASTELIN) 0.1 % nasal spray 2 sprays each nostril 1-2x daily as needed for nasal congestion (use nightly for first 2 week) 30 mL 3     blood glucose (NO BRAND SPECIFIED) test strip Use to test blood sugar 1x times daily. Preferred Blood Glucose Monitor Brands: per insurance. 100 strip 11     blood glucose calibration (NO BRAND SPECIFIED) solution To accompany: Blood Glucose Monitor Brands: per insurance. 1 each 11     blood glucose monitoring (NO BRAND SPECIFIED) meter device kit Use to test blood sugar 1x times daily. Preferred Blood Glucose Monitor Brands: per insurance. 1 kit 0     cetirizine (ZYRTEC) 10 MG tablet Take 10 mg by mouth daily       Continuous Blood Gluc Sensor (FREESTYLE DEDRICK 3 SENSOR) [facility] 1 each every 14 days Use 1 sensor every 14 days. Use to read blood sugars per 's instructions. 2 each 5     fluticasone-salmeterol (ADVAIR DISKUS) 250-50 MCG/ACT inhaler INHALE 1 PUFF INTO THE LUNGS TWICE DAILY 1 each 11     lisinopril (ZESTRIL) 10 MG tablet Take 1 tablet (10 mg) by mouth At Bedtime 90 tablet 0     metFORMIN (GLUCOPHAGE) 1000 MG tablet Take 1 tablet (1,000 mg) by mouth 2 times daily (with meals) 60 tablet 0     metroNIDAZOLE (METROGEL) 0.75 % external gel Apply to face BID 45 g 11     montelukast (SINGULAIR) 10 MG tablet TAKE 1 TABLET(10 MG) BY MOUTH AT BEDTIME 90 tablet 0     omeprazole (PRILOSEC) 20 MG DR capsule Take 1 capsule (20 mg) by mouth daily       sertraline  (ZOLOFT) 100 MG tablet Take 1 tablet (100 mg) by mouth daily 90 tablet 3     thin (NO BRAND SPECIFIED) lancets Use to test blood sugar 1x times daily. Preferred Blood Glucose Monitor Brands: per insurance. 100 each 11       No Known Allergies     Social History     Tobacco Use     Smoking status: Never     Smokeless tobacco: Never     Tobacco comments:     Occasionally   Substance Use Topics     Alcohol use: Not Currently     {FAMILY HISTORY (Optional):478377419}  History   Drug Use Unknown         Objective     There were no vitals taken for this visit.    Physical Exam  {EXAM Preop Choices:214339}    Recent Labs   Lab Test 23  0856 23  0951   * 140   POTASSIUM 3.8 4.7   CR 0.90 1.13   A1C 11.3* 7.6*        Diagnostics:  {LABS:439804}   {EK}    Revised Cardiac Risk Index (RCRI):  The patient has the following serious cardiovascular risks for perioperative complications:  {PREOP REVISED CARDIAC RISK INDEX (RCRI) :951152}     RCRI Interpretation: {REVISED CARDIAC RISK INTERPRETATION :862565}         Signed Electronically by: Otto Kenney MD  Copy of this evaluation report is provided to requesting physician.    {Provider Resources  Preop On license of [facility] Preop Guidelines  Revised Cardiac Risk Index :086165}

## 2023-09-08 ENCOUNTER — VIRTUAL VISIT (OUTPATIENT)
Dept: EDUCATION SERVICES | Facility: CLINIC | Age: 38
End: 2023-09-08
Payer: COMMERCIAL

## 2023-09-08 ENCOUNTER — TELEPHONE (OUTPATIENT)
Dept: EDUCATION SERVICES | Facility: CLINIC | Age: 38
End: 2023-09-08

## 2023-09-08 DIAGNOSIS — E11.65 TYPE 2 DIABETES MELLITUS WITH HYPERGLYCEMIA, WITHOUT LONG-TERM CURRENT USE OF INSULIN (H): Primary | ICD-10-CM

## 2023-09-08 PROCEDURE — G0108 DIAB MANAGE TRN  PER INDIV: HCPCS | Mod: VID | Performed by: NUTRITIONIST

## 2023-09-08 NOTE — LETTER
9/8/2023         RE: Jorge Madrigal  [address] [facility] [address]        Dear Colleague,    Thank you for referring your patient, Jorge Madrigal, to the [facility]. Please see a copy of my visit note below.    Diabetes Self-Management Education & Support    Presents for: Follow-up    Type of service:  Video Visit    If the video visit is dropped, the video visit invitation should be resent by: Text to cell phone: [phone]    Originating Location (pt. Location): Home  Distant Location (provider location): Offsite  Mode of Communication:  Video Conference via [employer] Start Time:  1:05 PM  Video End Time (time video stopped): 1:49 PM    How would patient like to obtain AVS? MyChart    Assessment Type:   ASSESSMENT:    Jorge reports that he started the Lantus the morning of 9/6/23 due to pharmacy hours and no pen needles given.  He states that he will follow titration schedule given by PCP.  Based on Mary data (below) the readings did start to decrease with the onset on insulin.  Reports that he has been working on watching CHO intake but feels he's probably not following portion sizes as recommended as he is hungry throughout the day. Is open to start a GLP-1 as soon as possible as he is feels he needs help with feeling of fullness and is anxious with his readings so high.        Patient's most recent   Lab Results   Component Value Date    A1C 11.3 07/28/2023     is not meeting goal of <7.0    Diabetes knowledge and skills assessment:   Patient is knowledgeable in diabetes management concepts related to: Monitoring, Taking Medication, and Healthy Coping    Continue education with the following diabetes management concepts: Healthy Eating, Being Active, Problem Solving, and Reducing Risks    Based on learning assessment above, most appropriate setting for further diabetes education would be: Individual setting.      PLAN    1) Continue with titration schedule  of Lantus  2) Take Lantus as close to the same time every day  3)  If Ozempic is approved recommend to start at 0.25 mg once weekly x 4 weeks then increase to 0.5 mg once weekly.  4) Continue with swimming and weight training 3 days/week  5) Limit carbohydrate at meal to 45-60 grams and snacks at 15 grams      Topics to cover at upcoming visits: Healthy Eating, Being Active, Problem Solving, and Reducing Risks    Follow-up: 10/13/23    See Care Plan for co-developed, patient-state behavior change goals.  AVS provided for patient today.    Education Materials Provided:  [employer] Healthy Living with Diabetes Book, Carbohydrate Counting, and My Plate Planner      SUBJECTIVE/OBJECTIVE:  Presents for: Follow-up  Accompanied by: Self  Diabetes education in the past 24mo: No  Focus of Visit: Monitoring, Taking Medication, Being Active, Healthy Eating  Diabetes type: Type 2  Disease course: Stable  How confident are you filling out medical forms by yourself:: Extremely  Transportation concerns: No  Difficulty affording diabetes medication?: No  Difficulty affording diabetes testing supplies?: No  Other concerns:: None  Cultural Influences/Ethnic Background:  Not  or       Diabetes Symptoms & Complications:  Fatigue: No  Polydipsia: No  Polyuria: No  Visual change: No  Slow healing wounds: No  Symptom course: Stable  Weight trend: Increasing  Complications assessed today?: No    Patient Problem List and Family Medical History reviewed for relevant medical history, current medical status, and diabetes risk factors.    Vitals:  There were no vitals taken for this visit.  Estimated body mass index is 34.12 kg/m  as calculated from the following:    Height as of 9/1/23: 1.829 m (6').    Weight as of 9/1/23: 114.1 kg (251 lb 9.6 oz).   Last 3 BP:   BP Readings from Last 3 Encounters:   09/01/23 (!) 142/76   02/16/23 (!) 142/90   08/11/22 (!) 150/90       History   Smoking Status     Never   Smokeless  "Tobacco     Never       Labs:  Lab Results   Component Value Date    A1C 11.3 07/28/2023     Lab Results   Component Value Date     07/28/2023     11/29/2021     01/16/2020     Lab Results   Component Value Date    LDL  07/28/2023      Comment:      Cannot estimate LDL when triglyceride exceeds 400 mg/dL     Direct Measure HDL   Date Value Ref Range Status   07/28/2023 32 (L) >=40 mg/dL Final   ]  GFR Estimate   Date Value Ref Range Status   07/28/2023 >90 >60 mL/min/1.73m2 Final   01/16/2020 >90 >60 mL/min/[1.73_m2] Final     Comment:     Non  GFR Calc  Starting 12/18/2018, serum creatinine based estimated GFR (eGFR) will be   calculated using the Chronic Kidney Disease Epidemiology Collaboration   (CKD-EPI) equation.       GFR Estimate If Black   Date Value Ref Range Status   01/16/2020 >90 >60 mL/min/[1.73_m2] Final     Comment:      GFR Calc  Starting 12/18/2018, serum creatinine based estimated GFR (eGFR) will be   calculated using the Chronic Kidney Disease Epidemiology Collaboration   (CKD-EPI) equation.       Lab Results   Component Value Date    CR 0.90 07/28/2023    CR 1.05 01/16/2020     No results found for: MICROALBUMIN    Healthy Eating:  Healthy Eating Assessed Today: Yes  Cultural/Zoroastrianism diet restrictions?: No  Meal planning/habits: None  How many times a week on average do you eat food made away from home (restaurant/take-out)?: 2  Meals include: Breakfast, Lunch, Dinner  Breakfast: 9 AM: yogurt with granola or oatmeal with raisins  Lunch:  PM: couple slices of homemade pizza, water  Dinner: 6 PM: black bean soup with tortilla, water  Snacks: at work- possibly popcorn or nuts, after work- fries, \"bad habit of snacking before bed- ice cream and cookies  Other: typically doesn't drink sweetened beverages  Beverages: Water, Tea, Diet soda (no alcohol use anymore)  Has patient met with a dietitian in the past?: No    Being Active:  Being " Active Assessed Today: Yes  Exercise:: Yes (swims and weight trains)  Days per week of moderate to strenuous exercise (like a brisk walk): 3  On average, minutes per day of exercise at this level: 120  Exercise Minutes per Week: 360  Barrier to exercise: Time    Monitoring:  Monitoring Assessed Today: Yes  Did patient bring glucose meter to appointment? : No  Blood Glucose Meter: Accu-chek  Times checking blood sugar at home (number): 1  Times checking blood sugar at home (per): Day                        Taking Medications:  Diabetes Medication(s)       Biguanides       metFORMIN (GLUCOPHAGE) 1000 MG tablet    Take 1 tablet (1,000 mg) by mouth 2 times daily (with meals)      Insulin       insulin glargine (LANTUS PEN) 100 UNIT/ML pen    Inject 15 Units Subcutaneous daily , titrate per instructions. (Max dose 40 u)            Taking Medication Assessed Today: Yes  Current Treatments: Oral Medication (taken by mouth), Diet, Insulin Injections  Dose schedule: Pre-breakfast  Given by: Patient  Injection/Infusion sites: Abdomen  Problems taking diabetes medications regularly?: No (He has not started)  Diabetes medication side effects?: No    Problem Solving:  Problem Solving Assessed Today: Yes  Is the patient at risk for hypoglycemia?: No  Is the patient at risk for DKA?: No         Reducing Risks:  Reducing Risks Assessed Today: No  Has dilated eye exam at least once a year?: No  Sees dentist every 6 months?: Yes  Feet checked by healthcare provider in the last year?: No    Healthy Coping:  Healthy Coping Assessed Today: Yes  Emotional response to diabetes: Ready to learn  Informal Support system:: Spouse  Stage of change: ACTION (Actively working towards change)  Support resources: Websites  Patient Activation Measure Survey Score:        Care Plan and Education Provided:  Care Plan: Diabetes   Updates made by Jacinda Donald RD since 9/8/2023 12:00 AM        Problem: Diabetes Self-Management Education Needed to  Optimize Self-Care Behaviors         Goal: Healthy Eating - follow a healthy eating pattern for diabetes         Task: Provide education on weight management Completed 9/8/2023   Responsible User: Simi Elizabeth        Goal: Taking Medication - patient is consistently taking medications as directed         Task: Provide education on insulin and injectable diabetes medications, including administration, storage, site selection and rotation for injection sites Completed 9/8/2023   Responsible User: Simi Elizabeth        Goal: Reducing Risks - know how to prevent and treat long-term diabetes complications         Task: Provide education on recommended care for dental, eye and foot health Completed 9/8/2023   Responsible User: Simi Elizabeth, RD, LD, CDCES     Time Spent: 45 minutes  Encounter Type: Individual    Any diabetes medication dose changes were made via the CDE Protocol per the patient's referring provider. A copy of this encounter was shared with the provider.

## 2023-09-08 NOTE — PATIENT INSTRUCTIONS
Drake Patel,    Here are some thing that we discussed today.      Goals for Diabetes Care:    1. Eat 3 balanced meals each day - Monitor carb intake and aim for 45-60 grams per meal       Aim to eat a balanced meal - half your plate fruits/veggies, 1/4 the plate protein and 1/4 plate starch (rice, potato, pasta)    2. Check blood sugars multiple times each day at varying times   Blood Glucose Targets:   1. Fasting and before meal target is 80 - 130 mg/dl   2. 2 hours after a meal target is < 180 mg/dl  Always remember to bring meter and/or log book to all appointments.    3. Activity really helps improve blood sugars.  Continue to incorporate 150 minutes aerobic activity each week along with resistance activities.    4. Treating low BG. Rule of 15 = BG 50-70 mg/dL = 15 gram carb (4 oz juice, 4 glucose tabs, OR 4 oz pop), then recheck BG in 15 minutes. If still low repeat. (If BG <50 mg/dL = 8 oz pop/juice or 8 glucose tabs).    5. Take diabetes medications as prescribed   -Metformin 1000 mg twice daily with meals  -Lantus 15 units once daily and follow your doctors schedule for increasing  -Will pend a recommendation to to start Ozempic 0.25 mg once weekly injections to physician, after 28 days or 4 injections increase dose to 0.5 mg once weekly.      This is a video link of how to use various diabetes injectable medications, please select your medication (Ozempic)  [email or web address]     Follow up with your Diabetic Educator to assess BG targets and need for modifications to medications and/or lifestyle.    Call with any questions.  Thank you!  Jacinda Cast, RD, LD, [facility]   Certified Diabetes Care &   [facility]  Triage [phone] or Scheduling [phone]

## 2023-09-08 NOTE — TELEPHONE ENCOUNTER
Recommend to start GLP-1 as blood glucose levels have been slow to decrease and patient would benefit from a medication to help with rises at mealtime.  Pended order for Ozempic start at 0.25 mg once weekly and the increase to 0.5 mg once weekly with 5th injection.

## 2023-09-08 NOTE — PROGRESS NOTES
Diabetes Self-Management Education & Support    Presents for: Follow-up    Type of service:  Video Visit    If the video visit is dropped, the video visit invitation should be resent by: Text to cell phone: [phone]    Originating Location (pt. Location): Home  Distant Location (provider location): Offsite  Mode of Communication:  Video Conference via [employer]    Video Start Time:  1:05 PM  Video End Time (time video stopped): 1:49 PM    How would patient like to obtain AVS? MyChart    Assessment Type:   ASSESSMENT:    Jorge reports that he started the Lantus the morning of 9/6/23 due to pharmacy hours and no pen needles given.  He states that he will follow titration schedule given by PCP.  Based on Mary data (below) the readings did start to decrease with the onset on insulin.  Reports that he has been working on watching CHO intake but feels he's probably not following portion sizes as recommended as he is hungry throughout the day. Is open to start a GLP-1 as soon as possible as he is feels he needs help with feeling of fullness and is anxious with his readings so high.        Patient's most recent   Lab Results   Component Value Date    A1C 11.3 07/28/2023     is not meeting goal of <7.0    Diabetes knowledge and skills assessment:   Patient is knowledgeable in diabetes management concepts related to: Monitoring, Taking Medication, and Healthy Coping    Continue education with the following diabetes management concepts: Healthy Eating, Being Active, Problem Solving, and Reducing Risks    Based on learning assessment above, most appropriate setting for further diabetes education would be: Individual setting.      PLAN    1) Continue with titration schedule of Lantus  2) Take Lantus as close to the same time every day  3)  If Ozempic is approved recommend to start at 0.25 mg once weekly x 4 weeks then increase to 0.5 mg once weekly.  4) Continue with swimming and weight training 3 days/week  5) Limit  carbohydrate at meal to 45-60 grams and snacks at 15 grams      Topics to cover at upcoming visits: Healthy Eating, Being Active, Problem Solving, and Reducing Risks    Follow-up: 10/13/23    See Care Plan for co-developed, patient-state behavior change goals.  AVS provided for patient today.    Education Materials Provided:  [employer] Healthy Living with Diabetes Book, Carbohydrate Counting, and My Plate Planner      SUBJECTIVE/OBJECTIVE:  Presents for: Follow-up  Accompanied by: Self  Diabetes education in the past 24mo: No  Focus of Visit: Monitoring, Taking Medication, Being Active, Healthy Eating  Diabetes type: Type 2  Disease course: Stable  How confident are you filling out medical forms by yourself:: Extremely  Transportation concerns: No  Difficulty affording diabetes medication?: No  Difficulty affording diabetes testing supplies?: No  Other concerns:: None  Cultural Influences/Ethnic Background:  Not  or       Diabetes Symptoms & Complications:  Fatigue: No  Polydipsia: No  Polyuria: No  Visual change: No  Slow healing wounds: No  Symptom course: Stable  Weight trend: Increasing  Complications assessed today?: No    Patient Problem List and Family Medical History reviewed for relevant medical history, current medical status, and diabetes risk factors.    Vitals:  There were no vitals taken for this visit.  Estimated body mass index is 34.12 kg/m  as calculated from the following:    Height as of 9/1/23: 1.829 m (6').    Weight as of 9/1/23: 114.1 kg (251 lb 9.6 oz).   Last 3 BP:   BP Readings from Last 3 Encounters:   09/01/23 (!) 142/76   02/16/23 (!) 142/90   08/11/22 (!) 150/90       History   Smoking Status    Never   Smokeless Tobacco    Never       Labs:  Lab Results   Component Value Date    A1C 11.3 07/28/2023     Lab Results   Component Value Date     07/28/2023     11/29/2021     01/16/2020     Lab Results   Component Value Date    LDL  07/28/2023       "Comment:      Cannot estimate LDL when triglyceride exceeds 400 mg/dL     Direct Measure HDL   Date Value Ref Range Status   07/28/2023 32 (L) >=40 mg/dL Final   ]  GFR Estimate   Date Value Ref Range Status   07/28/2023 >90 >60 mL/min/1.73m2 Final   01/16/2020 >90 >60 mL/min/[1.73_m2] Final     Comment:     Non  GFR Calc  Starting 12/18/2018, serum creatinine based estimated GFR (eGFR) will be   calculated using the Chronic Kidney Disease Epidemiology Collaboration   (CKD-EPI) equation.       GFR Estimate If Black   Date Value Ref Range Status   01/16/2020 >90 >60 mL/min/[1.73_m2] Final     Comment:      GFR Calc  Starting 12/18/2018, serum creatinine based estimated GFR (eGFR) will be   calculated using the Chronic Kidney Disease Epidemiology Collaboration   (CKD-EPI) equation.       Lab Results   Component Value Date    CR 0.90 07/28/2023    CR 1.05 01/16/2020     No results found for: MICROALBUMIN    Healthy Eating:  Healthy Eating Assessed Today: Yes  Cultural/Anabaptism diet restrictions?: No  Meal planning/habits: None  How many times a week on average do you eat food made away from home (restaurant/take-out)?: 2  Meals include: Breakfast, Lunch, Dinner  Breakfast: 9 AM: yogurt with granola or oatmeal with raisins  Lunch:  PM: couple slices of homemade pizza, water  Dinner: 6 PM: black bean soup with tortilla, water  Snacks: at work- possibly popcorn or nuts, after work- fries, \"bad habit of snacking before bed- ice cream and cookies  Other: typically doesn't drink sweetened beverages  Beverages: Water, Tea, Diet soda (no alcohol use anymore)  Has patient met with a dietitian in the past?: No    Being Active:  Being Active Assessed Today: Yes  Exercise:: Yes (swims and weight trains)  Days per week of moderate to strenuous exercise (like a brisk walk): 3  On average, minutes per day of exercise at this level: 120  Exercise Minutes per Week: 360  Barrier to exercise: " Time    Monitoring:  Monitoring Assessed Today: Yes  Did patient bring glucose meter to appointment? : No  Blood Glucose Meter: Accu-chek  Times checking blood sugar at home (number): 1  Times checking blood sugar at home (per): Day                        Taking Medications:  Diabetes Medication(s)       Biguanides       metFORMIN (GLUCOPHAGE) 1000 MG tablet    Take 1 tablet (1,000 mg) by mouth 2 times daily (with meals)      Insulin       insulin glargine (LANTUS PEN) 100 UNIT/ML pen    Inject 15 Units Subcutaneous daily , titrate per instructions. (Max dose 40 u)            Taking Medication Assessed Today: Yes  Current Treatments: Oral Medication (taken by mouth), Diet, Insulin Injections  Dose schedule: Pre-breakfast  Given by: Patient  Injection/Infusion sites: Abdomen  Problems taking diabetes medications regularly?: No (He has not started)  Diabetes medication side effects?: No    Problem Solving:  Problem Solving Assessed Today: Yes  Is the patient at risk for hypoglycemia?: No  Is the patient at risk for DKA?: No         Reducing Risks:  Reducing Risks Assessed Today: No  Has dilated eye exam at least once a year?: No  Sees dentist every 6 months?: Yes  Feet checked by healthcare provider in the last year?: No    Healthy Coping:  Healthy Coping Assessed Today: Yes  Emotional response to diabetes: Ready to learn  Informal Support system:: Spouse  Stage of change: ACTION (Actively working towards change)  Support resources: Websites  Patient Activation Measure Survey Score:        Care Plan and Education Provided:  Care Plan: Diabetes   Updates made by Jacinda Donald RD since 9/8/2023 12:00 AM        Problem: Diabetes Self-Management Education Needed to Optimize Self-Care Behaviors         Goal: Healthy Eating - follow a healthy eating pattern for diabetes         Task: Provide education on weight management Completed 9/8/2023   Responsible User: Simi Elizabeth        Goal: Taking Medication - patient  is consistently taking medications as directed         Task: Provide education on insulin and injectable diabetes medications, including administration, storage, site selection and rotation for injection sites Completed 9/8/2023   Responsible User: Simi Elizabeth        Goal: Reducing Risks - know how to prevent and treat long-term diabetes complications         Task: Provide education on recommended care for dental, eye and foot health Completed 9/8/2023   Responsible User: Simi Elizabeth RD, LD, [facility]     Time Spent: 45 minutes  Encounter Type: Individual    Any diabetes medication dose changes were made via the CDE Protocol per the patient's referring provider. A copy of this encounter was shared with the provider.

## 2023-09-15 ENCOUNTER — TELEPHONE (OUTPATIENT)
Dept: INTERNAL MEDICINE | Facility: CLINIC | Age: 38
End: 2023-09-15
Payer: COMMERCIAL

## 2023-09-15 DIAGNOSIS — E11.65 TYPE 2 DIABETES MELLITUS WITH HYPERGLYCEMIA, WITH LONG-TERM CURRENT USE OF INSULIN (H): Primary | ICD-10-CM

## 2023-09-15 DIAGNOSIS — Z79.4 TYPE 2 DIABETES MELLITUS WITH HYPERGLYCEMIA, WITH LONG-TERM CURRENT USE OF INSULIN (H): Primary | ICD-10-CM

## 2023-09-15 NOTE — TELEPHONE ENCOUNTER
"Per pharmacy    \"Plan does not cover semaglutide (OZEMPIC) 2 MG/3ML pen.  Please call paln at [phone] to initiate  PA or call/fax [phone] to change med.      Pt ID is [id number]\"    Nancy   "

## 2023-09-19 NOTE — TELEPHONE ENCOUNTER
Central Prior Authorization Team  Phone: [phone]    PA Initiation    Medication: OZEMPIC (0.25 OR 0.5 MG/DOSE) 2 MG/3ML SC SOPN  Insurance Company: [employer] PMA - Phone [phone] Fax [phone]  Ph[id number] [address] AT [facility] [address]  Filling Pharmacy Phone: [phone]  Filling Pharmacy Fax:    Start Date: 9/19/2023

## 2023-09-20 NOTE — TELEPHONE ENCOUNTER
PRIOR AUTHORIZATION DENIED    Medication: OZEMPIC (0.25 OR 0.5 MG/DOSE) [facility]  Insurance Company: [employer]AP - Phone [phone] Fax [phone]  Denial Date: 9/20/2023    Denial Rational: Patient must have a history of trial & failure to 2 formulary alternatives or have a contraindication or intolerance to the formulary alternatives:    Appeal Information: If provider would like to appeal please provide a letter of medical necessity.

## 2023-09-22 ENCOUNTER — OFFICE VISIT (OUTPATIENT)
Dept: INTERNAL MEDICINE | Facility: CLINIC | Age: 38
End: 2023-09-22
Payer: COMMERCIAL

## 2023-09-22 VITALS
TEMPERATURE: 97.2 F | HEIGHT: 72 IN | OXYGEN SATURATION: 100 % | RESPIRATION RATE: 16 BRPM | SYSTOLIC BLOOD PRESSURE: 124 MMHG | HEART RATE: 68 BPM | WEIGHT: 251.4 LBS | BODY MASS INDEX: 34.05 KG/M2 | DIASTOLIC BLOOD PRESSURE: 76 MMHG

## 2023-09-22 DIAGNOSIS — E66.01 MORBID OBESITY (H): ICD-10-CM

## 2023-09-22 DIAGNOSIS — Z01.818 PREOP GENERAL PHYSICAL EXAM: Primary | ICD-10-CM

## 2023-09-22 DIAGNOSIS — J45.40 MODERATE PERSISTENT ASTHMA WITHOUT COMPLICATION: ICD-10-CM

## 2023-09-22 DIAGNOSIS — Q67.4 CONGENITAL NASAL SEPTUM DEVIATION: ICD-10-CM

## 2023-09-22 DIAGNOSIS — E11.65 TYPE 2 DIABETES MELLITUS WITH HYPERGLYCEMIA, WITH LONG-TERM CURRENT USE OF INSULIN (H): ICD-10-CM

## 2023-09-22 DIAGNOSIS — Z79.4 TYPE 2 DIABETES MELLITUS WITH HYPERGLYCEMIA, WITH LONG-TERM CURRENT USE OF INSULIN (H): ICD-10-CM

## 2023-09-22 LAB
ANION GAP SERPL CALCULATED.3IONS-SCNC: 13 MMOL/L (ref 7–15)
BUN SERPL-MCNC: 14.1 MG/DL (ref 6–20)
CALCIUM SERPL-MCNC: 8.8 MG/DL (ref 8.6–10)
CHLORIDE SERPL-SCNC: 102 MMOL/L (ref 98–107)
CREAT SERPL-MCNC: 0.93 MG/DL (ref 0.67–1.17)
DEPRECATED HCO3 PLAS-SCNC: 25 MMOL/L (ref 22–29)
EGFRCR SERPLBLD CKD-EPI 2021: >90 ML/MIN/1.73M2
GLUCOSE SERPL-MCNC: 129 MG/DL (ref 70–99)
HGB BLD-MCNC: 14.6 G/DL (ref 13.3–17.7)
POTASSIUM SERPL-SCNC: 4.2 MMOL/L (ref 3.4–5.3)
SODIUM SERPL-SCNC: 140 MMOL/L (ref 136–145)

## 2023-09-22 PROCEDURE — 90686 IIV4 VACC NO PRSV 0.5 ML IM: CPT | Performed by: INTERNAL MEDICINE

## 2023-09-22 PROCEDURE — 90471 IMMUNIZATION ADMIN: CPT | Performed by: INTERNAL MEDICINE

## 2023-09-22 PROCEDURE — 36415 COLL VENOUS BLD VENIPUNCTURE: CPT | Performed by: INTERNAL MEDICINE

## 2023-09-22 PROCEDURE — 99214 OFFICE O/P EST MOD 30 MIN: CPT | Mod: 25 | Performed by: INTERNAL MEDICINE

## 2023-09-22 PROCEDURE — 80048 BASIC METABOLIC PNL TOTAL CA: CPT | Performed by: INTERNAL MEDICINE

## 2023-09-22 PROCEDURE — 85018 HEMOGLOBIN: CPT | Performed by: INTERNAL MEDICINE

## 2023-09-22 RX ORDER — LIRAGLUTIDE 6 MG/ML
INJECTION SUBCUTANEOUS
Qty: 6 ML | Refills: 2 | Status: SHIPPED | OUTPATIENT
Start: 2023-09-22 | End: 2026-07-01

## 2023-09-22 NOTE — H&P (VIEW-ONLY)
[address] [address]  Phone: [phone]  Primary Provider: Wanda Kenney  Pre-op Performing Provider: WANDA KENNEY      PREOPERATIVE EVALUATION:  Today's date: 9/22/2023    Jorge is a 38 year old male who presents for a preoperative evaluation.      Surgical Information:  Surgery/Procedure: SEPTOPLASTY, NOSE, WITH TURBINOPLASTY   Surgery Location: [facility]  Surgeon: Dr. Juan  Surgery Date: 09/28/2023  Time of Surgery: 07:30am  Where patient plans to recover: At home with family  Fax number for surgical facility: Note does not need to be faxed, will be available electronically in Epic.    Assessment & Plan     The proposed surgical procedure is considered LOW risk.    Preop general physical exam  Congenital nasal septum deviation  Type 2 diabetes mellitus with hyperglycemia, with long-term current use of insulin (H)  Morbid obesity (H)  Moderate persistent asthma without complication   -GLYCEMIC CONTROL MUCH BETTER WITH RECENT ADDITION ON INSULIN       - No identified additional risk factors other than previously addressed    Antiplatelet or Anticoagulation Medication Instructions:   - Patient is on no antiplatelet or anticoagulation medications.    Additional Medication Instructions:  Patient is to take all scheduled medications on the day of surgery EXCEPT for modifications listed below:   - ACE/ARB: HOLD on day of surgery (minimum 11 hours for general anesthesia).   - Long acting insulin (e.g. glargine, detemir): Take 80% of the usual evening or morning dose before surgery.    - metformin: HOLD day of surgery.    RECOMMENDATION:  APPROVAL GIVEN to proceed with proposed procedure, without further diagnostic evaluation.    Review of prior external note(s) from - Outside records from [facility]  Review of the result(s) of each unique test - LABS  Ordering of each unique test  Prescription drug management        Subjective       HPI related  to upcoming procedure: deviated nasal septum        9/15/2023     9:11 AM   Preop Questions   1. Have you ever had a heart attack or stroke? No   2. Have you ever had surgery on your heart or blood vessels, such as a stent placement, a coronary artery bypass, or surgery on an artery in your head, neck, heart, or legs? No   3. Do you have chest pain with activity? No   4. Do you have a history of  heart failure? No   5. Do you currently have a cold, bronchitis or symptoms of other infection? No   6. Do you have a cough, shortness of breath, or wheezing? No   7. Do you or anyone in your family have previous history of blood clots? No   8. Do you or does anyone in your family have a serious bleeding problem such as prolonged bleeding following surgeries or cuts? No   9. Have you ever had problems with anemia or been told to take iron pills? No   10. Have you had any abnormal blood loss such as black, tarry or bloody stools? No   11. Have you ever had a blood transfusion? No   12. Are you willing to have a blood transfusion if it is medically needed before, during, or after your surgery? Yes   13. Have you or any of your relatives ever had problems with anesthesia? No   14. Do you have sleep apnea, excessive snoring or daytime drowsiness? No   15. Do you have any artifical heart valves or other implanted medical devices like a pacemaker, defibrillator, or continuous glucose monitor? No   16. Do you have artificial joints? No   17. Are you allergic to latex? No       Health Care Directive:  Patient does not have a Health Care Directive or Living Will:     Preoperative Review of :   reviewed - no record of controlled substances prescribed.      Review of Systems  Constitutional, neuro, ENT, endocrine, pulmonary, cardiac, gastrointestinal, genitourinary, musculoskeletal, integument and psychiatric systems are negative, except as otherwise noted.    Patient Active Problem List    Diagnosis Date Noted    Type 2  diabetes mellitus with hyperglycemia, without long-term current use of insulin (H) 03/03/2023     Priority: Medium    Alcohol dependence in remission (H) 11/03/2022     Priority: Medium    Acute cough 11/03/2022     Priority: Medium    Recurrent major depressive disorder, in full remission (H) 11/03/2022     Priority: Medium    Alcohol use disorder, mild, in sustained remission 05/15/2020     Priority: Medium    Morbid obesity (H) 01/04/2020     Priority: Medium    Moderate persistent asthma without complication      Priority: Medium    GERD (gastroesophageal reflux disease)      Priority: Medium    Recurrent major depressive disorder, in partial remission (H)      Priority: Medium      Past Medical History:   Diagnosis Date    Alcohol dependence in remission (H)     Asthma     Depressive disorder     GERD (gastroesophageal reflux disease)     Hypertension 05/01/2023    Infection due to 2019 novel coronavirus 11/03/2022    Type 2 diabetes mellitus with hyperglycemia, without long-term current use of insulin (H) 03/03/2023     Past Surgical History:   Procedure Laterality Date    COLONOSCOPY  2015    Normal     Current Outpatient Medications   Medication Sig Dispense Refill    acetaminophen (TYLENOL) 325 MG tablet Take 325-650 mg by mouth      albuterol (ACCUNEB) 0.63 MG/3ML neb solution Take 3 mLs (0.63 mg) by nebulization every 4 hours as needed for shortness of breath or wheezing 60 mL 11    albuterol (VENTOLIN HFA) 108 (90 Base) MCG/ACT inhaler Inhale 2 puffs into the lungs every 4 hours as needed for wheezing or shortness of breath 18 g 5    cetirizine (ZYRTEC) 10 MG tablet Take 10 mg by mouth daily      Continuous Blood Gluc Sensor (FREESTYLE DEDRICK 3 SENSOR) MISC 1 each every 14 days Use 1 sensor every 14 days. Use to read blood sugars per 's instructions. 2 each 5    fluticasone-salmeterol (ADVAIR DISKUS) 250-50 MCG/ACT inhaler INHALE 1 PUFF INTO THE LUNGS TWICE DAILY 1 each 11    insulin glargine  (LANTUS PEN) 100 UNIT/ML pen Inject 29 Units Subcutaneous daily , titrate per instructions. (Max dose 40 u) 15 mL 0    insulin pen needle (31G X 5 MM) 31G X 5 MM miscellaneous Use 1 pen needles daily or as directed. 100 each 11    lisinopril (ZESTRIL) 10 MG tablet Take 1 tablet (10 mg) by mouth At Bedtime 90 tablet 0    metFORMIN (GLUCOPHAGE) 1000 MG tablet Take 1 tablet (1,000 mg) by mouth 2 times daily (with meals) 180 tablet 3    metroNIDAZOLE (METROGEL) 0.75 % external gel Apply to face BID 45 g 11    montelukast (SINGULAIR) 10 MG tablet TAKE 1 TABLET(10 MG) BY MOUTH AT BEDTIME 90 tablet 0    omeprazole (PRILOSEC) 20 MG DR capsule Take 1 capsule (20 mg) by mouth daily      sertraline (ZOLOFT) 100 MG tablet Take 1 tablet (100 mg) by mouth daily 90 tablet 3    thin (NO BRAND SPECIFIED) lancets Use to test blood sugar 1x times daily. Preferred Blood Glucose Monitor Brands: per insurance. 100 each 11       No Known Allergies     Social History     Tobacco Use    Smoking status: Never    Smokeless tobacco: Never    Tobacco comments:     Occasionally   Substance Use Topics    Alcohol use: Not Currently       History   Drug Use Unknown         Objective     /76   Pulse 68   Temp 97.2  F (36.2  C) (Temporal)   Resp 16   Ht 1.829 m (6')   Wt 114 kg (251 lb 6.4 oz)   SpO2 100%   BMI 34.10 kg/m      Physical Exam    GENERAL APPEARANCE: alert and over weight     EYES: EOMI,  PERRL     HENT: ear canals and TM's normal and nose and mouth without ulcers or lesions     NECK: no adenopathy, no asymmetry, masses, or scars and thyroid normal to palpation     RESP: lungs clear to auscultation - no rales, rhonchi or wheezes     CV: regular rates and rhythm, normal S1 S2, no S3 or S4 and no murmur, click or rub     ABDOMEN:  soft, nontender, no HSM or masses and bowel sounds normal     MS: extremities normal- no gross deformities noted, no evidence of inflammation in joints, FROM in all extremities.     SKIN: no  suspicious lesions or rashes     NEURO: Normal strength and tone, sensory exam grossly normal, mentation intact and speech normal     PSYCH: mentation appears normal. and affect normal/bright     LYMPHATICS: No cervical adenopathy    Recent Labs   Lab Test 07/28/23  0856 02/16/23  0951   * 140   POTASSIUM 3.8 4.7   CR 0.90 1.13   A1C 11.3* 7.6*        Diagnostics:  Recent Results (from the past 24 hour(s))   Hemoglobin    Collection Time: 09/22/23 10:20 AM   Result Value Ref Range    Hemoglobin 14.6 13.3 - 17.7 g/dL      No EKG required, no history of coronary heart disease, significant arrhythmia, peripheral arterial disease or other structural heart disease.    Revised Cardiac Risk Index (RCRI):  The patient has the following serious cardiovascular risks for perioperative complications:   - No serious cardiac risks = 0 points     RCRI Interpretation: 0 points: Class I (very low risk - 0.4% complication rate)         Signed Electronically by: Otto Kenney MD  Copy of this evaluation report is provided to requesting physician.

## 2023-09-22 NOTE — PROGRESS NOTES
[address] [address]  Phone: [phone]  Primary Provider: Wanda Kenney  Pre-op Performing Provider: WANDA KENNEY      PREOPERATIVE EVALUATION:  Today's date: 9/22/2023    Jorge is a 38 year old male who presents for a preoperative evaluation.      Surgical Information:  Surgery/Procedure: SEPTOPLASTY, NOSE, WITH TURBINOPLASTY   Surgery Location: [facility]  Surgeon: Dr. Juan  Surgery Date: 09/28/2023  Time of Surgery: 07:30am  Where patient plans to recover: At home with family  Fax number for surgical facility: Note does not need to be faxed, will be available electronically in Epic.    Assessment & Plan     The proposed surgical procedure is considered LOW risk.    Preop general physical exam  Congenital nasal septum deviation  Type 2 diabetes mellitus with hyperglycemia, with long-term current use of insulin (H)  Morbid obesity (H)  Moderate persistent asthma without complication   -GLYCEMIC CONTROL MUCH BETTER WITH RECENT ADDITION ON INSULIN       - No identified additional risk factors other than previously addressed    Antiplatelet or Anticoagulation Medication Instructions:   - Patient is on no antiplatelet or anticoagulation medications.    Additional Medication Instructions:  Patient is to take all scheduled medications on the day of surgery EXCEPT for modifications listed below:   - ACE/ARB: HOLD on day of surgery (minimum 11 hours for general anesthesia).   - Long acting insulin (e.g. glargine, detemir): Take 80% of the usual evening or morning dose before surgery.    - metformin: HOLD day of surgery.    RECOMMENDATION:  APPROVAL GIVEN to proceed with proposed procedure, without further diagnostic evaluation.    Review of prior external note(s) from - Outside records from [facility]  Review of the result(s) of each unique test - LABS  Ordering of each unique test  Prescription drug management        Subjective       HPI related  to upcoming procedure: deviated nasal septum        9/15/2023     9:11 AM   Preop Questions   1. Have you ever had a heart attack or stroke? No   2. Have you ever had surgery on your heart or blood vessels, such as a stent placement, a coronary artery bypass, or surgery on an artery in your head, neck, heart, or legs? No   3. Do you have chest pain with activity? No   4. Do you have a history of  heart failure? No   5. Do you currently have a cold, bronchitis or symptoms of other infection? No   6. Do you have a cough, shortness of breath, or wheezing? No   7. Do you or anyone in your family have previous history of blood clots? No   8. Do you or does anyone in your family have a serious bleeding problem such as prolonged bleeding following surgeries or cuts? No   9. Have you ever had problems with anemia or been told to take iron pills? No   10. Have you had any abnormal blood loss such as black, tarry or bloody stools? No   11. Have you ever had a blood transfusion? No   12. Are you willing to have a blood transfusion if it is medically needed before, during, or after your surgery? Yes   13. Have you or any of your relatives ever had problems with anesthesia? No   14. Do you have sleep apnea, excessive snoring or daytime drowsiness? No   15. Do you have any artifical heart valves or other implanted medical devices like a pacemaker, defibrillator, or continuous glucose monitor? No   16. Do you have artificial joints? No   17. Are you allergic to latex? No       Health Care Directive:  Patient does not have a Health Care Directive or Living Will:     Preoperative Review of :   reviewed - no record of controlled substances prescribed.      Review of Systems  Constitutional, neuro, ENT, endocrine, pulmonary, cardiac, gastrointestinal, genitourinary, musculoskeletal, integument and psychiatric systems are negative, except as otherwise noted.    Patient Active Problem List    Diagnosis Date Noted    Type 2  diabetes mellitus with hyperglycemia, without long-term current use of insulin (H) 03/03/2023     Priority: Medium    Alcohol dependence in remission (H) 11/03/2022     Priority: Medium    Acute cough 11/03/2022     Priority: Medium    Recurrent major depressive disorder, in full remission (H) 11/03/2022     Priority: Medium    Alcohol use disorder, mild, in sustained remission 05/15/2020     Priority: Medium    Morbid obesity (H) 01/04/2020     Priority: Medium    Moderate persistent asthma without complication      Priority: Medium    GERD (gastroesophageal reflux disease)      Priority: Medium    Recurrent major depressive disorder, in partial remission (H)      Priority: Medium      Past Medical History:   Diagnosis Date    Alcohol dependence in remission (H)     Asthma     Depressive disorder     GERD (gastroesophageal reflux disease)     Hypertension 05/01/2023    Infection due to 2019 novel coronavirus 11/03/2022    Type 2 diabetes mellitus with hyperglycemia, without long-term current use of insulin (H) 03/03/2023     Past Surgical History:   Procedure Laterality Date    COLONOSCOPY  2015    Normal     Current Outpatient Medications   Medication Sig Dispense Refill    acetaminophen (TYLENOL) 325 MG tablet Take 325-650 mg by mouth      albuterol (ACCUNEB) 0.63 MG/3ML neb solution Take 3 mLs (0.63 mg) by nebulization every 4 hours as needed for shortness of breath or wheezing 60 mL 11    albuterol (VENTOLIN HFA) 108 (90 Base) MCG/ACT inhaler Inhale 2 puffs into the lungs every 4 hours as needed for wheezing or shortness of breath 18 g 5    cetirizine (ZYRTEC) 10 MG tablet Take 10 mg by mouth daily      Continuous Blood Gluc Sensor (FREESTYLE DEDRICK 3 SENSOR) MISC 1 each every 14 days Use 1 sensor every 14 days. Use to read blood sugars per 's instructions. 2 each 5    fluticasone-salmeterol (ADVAIR DISKUS) 250-50 MCG/ACT inhaler INHALE 1 PUFF INTO THE LUNGS TWICE DAILY 1 each 11    insulin glargine  (LANTUS PEN) 100 UNIT/ML pen Inject 29 Units Subcutaneous daily , titrate per instructions. (Max dose 40 u) 15 mL 0    insulin pen needle (31G X 5 MM) 31G X 5 MM miscellaneous Use 1 pen needles daily or as directed. 100 each 11    lisinopril (ZESTRIL) 10 MG tablet Take 1 tablet (10 mg) by mouth At Bedtime 90 tablet 0    metFORMIN (GLUCOPHAGE) 1000 MG tablet Take 1 tablet (1,000 mg) by mouth 2 times daily (with meals) 180 tablet 3    metroNIDAZOLE (METROGEL) 0.75 % external gel Apply to face BID 45 g 11    montelukast (SINGULAIR) 10 MG tablet TAKE 1 TABLET(10 MG) BY MOUTH AT BEDTIME 90 tablet 0    omeprazole (PRILOSEC) 20 MG DR capsule Take 1 capsule (20 mg) by mouth daily      sertraline (ZOLOFT) 100 MG tablet Take 1 tablet (100 mg) by mouth daily 90 tablet 3    thin (NO BRAND SPECIFIED) lancets Use to test blood sugar 1x times daily. Preferred Blood Glucose Monitor Brands: per insurance. 100 each 11       No Known Allergies     Social History     Tobacco Use    Smoking status: Never    Smokeless tobacco: Never    Tobacco comments:     Occasionally   Substance Use Topics    Alcohol use: Not Currently       History   Drug Use Unknown         Objective     /76   Pulse 68   Temp 97.2  F (36.2  C) (Temporal)   Resp 16   Ht 1.829 m (6')   Wt 114 kg (251 lb 6.4 oz)   SpO2 100%   BMI 34.10 kg/m      Physical Exam    GENERAL APPEARANCE: alert and over weight     EYES: EOMI,  PERRL     HENT: ear canals and TM's normal and nose and mouth without ulcers or lesions     NECK: no adenopathy, no asymmetry, masses, or scars and thyroid normal to palpation     RESP: lungs clear to auscultation - no rales, rhonchi or wheezes     CV: regular rates and rhythm, normal S1 S2, no S3 or S4 and no murmur, click or rub     ABDOMEN:  soft, nontender, no HSM or masses and bowel sounds normal     MS: extremities normal- no gross deformities noted, no evidence of inflammation in joints, FROM in all extremities.     SKIN: no  suspicious lesions or rashes     NEURO: Normal strength and tone, sensory exam grossly normal, mentation intact and speech normal     PSYCH: mentation appears normal. and affect normal/bright     LYMPHATICS: No cervical adenopathy    Recent Labs   Lab Test 07/28/23  0856 02/16/23  0951   * 140   POTASSIUM 3.8 4.7   CR 0.90 1.13   A1C 11.3* 7.6*        Diagnostics:  Recent Results (from the past 24 hour(s))   Hemoglobin    Collection Time: 09/22/23 10:20 AM   Result Value Ref Range    Hemoglobin 14.6 13.3 - 17.7 g/dL      No EKG required, no history of coronary heart disease, significant arrhythmia, peripheral arterial disease or other structural heart disease.    Revised Cardiac Risk Index (RCRI):  The patient has the following serious cardiovascular risks for perioperative complications:   - No serious cardiac risks = 0 points     RCRI Interpretation: 0 points: Class I (very low risk - 0.4% complication rate)         Signed Electronically by: Otto Kenney MD  Copy of this evaluation report is provided to requesting physician.

## 2023-09-22 NOTE — PATIENT INSTRUCTIONS
Preparing for Your Surgery  Getting started    How to Take Your Medication Before Surgery  - Take all of your medications before surgery except as noted below  - HOLD (do not take) your LISINOPRIL on the evening before surgery.   - HOLD (do not take) METFORMIN the morning of surgery  Take 80% (23 units) of your glargine insulin the morning of surgery     A nurse will call you to review your health history and instructions. They will give you an arrival time based on your scheduled surgery time. Please be ready to share:  Your doctor's clinic name and phone number  Your medical, surgical, and anesthesia history  A list of allergies and sensitivities  A list of medicines, including herbal treatments and over-the-counter drugs  Whether the patient has a legal guardian (ask how to send us the papers in advance)  Please tell us if you're pregnant--or if there's any chance you might be pregnant. Some surgeries may injure a fetus (unborn baby), so they require a pregnancy test. Surgeries that are safe for a fetus don't always need a test, and you can choose whether to have one.   If you have a child who's having surgery, please ask for a copy of Preparing for Your Child's Surgery.    Preparing for surgery  Within 10 to 30 days of surgery: Have a pre-op exam (sometimes called an H&P, or History and Physical). This can be done at a clinic or pre-operative center.  If you're having a , you may not need this exam. Talk to your care team.  At your pre-op exam, talk to your care team about all medicines you take. If you need to stop any medicines before surgery, ask when to start taking them again.  We do this for your safety. Many medicines can make you bleed too much during surgery. Some change how well surgery (anesthesia) drugs work.  Call your insurance company to let them know you're having surgery. (If you don't have insurance, call [phone].)  Call your clinic if there's any change in your health. This  includes signs of a cold or flu (sore throat, runny nose, cough, rash, fever). It also includes a scrape or scratch near the surgery site.  If you have questions on the day of surgery, call your hospital or surgery center.  Eating and drinking guidelines  For your safety: Unless your surgeon tells you otherwise, follow the guidelines below.  Eat and drink as usual until 8 hours before you arrive for surgery. After that, no food or milk.  Drink clear liquids until 2 hours before you arrive. These are liquids you can see through, like water, Gatorade, and Propel Water. They also include plain black coffee and tea (no cream or milk), candy, and breath mints. You can spit out gum when you arrive.  If you drink alcohol: Stop drinking it the night before surgery.  If your care team tells you to take medicine on the morning of surgery, it's okay to take it with a sip of water.  Preventing infection  Shower or bathe the night before and morning of your surgery. Follow the instructions your clinic gave you. (If no instructions, use regular soap.)  Don't shave or clip hair near your surgery site. We'll remove the hair if needed.  Don't smoke or vape the morning of surgery. You may chew nicotine gum up to 2 hours before surgery. A nicotine patch is okay.  Note: Some surgeries require you to completely quit smoking and nicotine. Check with your surgeon.  Your care team will make every effort to keep you safe from infection. We will:  Clean our hands often with soap and water (or an alcohol-based hand rub).  Clean the skin at your surgery site with a special soap that kills germs.  Give you a special gown to keep you warm. (Cold raises the risk of infection.)  Wear special hair covers, masks, gowns and gloves during surgery.  Give antibiotic medicine, if prescribed. Not all surgeries need antibiotics.  What to bring on the day of surgery  Photo ID and insurance card  Copy of your health care directive, if you have one  Glasses  and hearing aids (bring cases)  You can't wear contacts during surgery  Inhaler and eye drops, if you use them (tell us about these when you arrive)  CPAP machine or breathing device, if you use them  A few personal items, if spending the night  If you have . . .  A pacemaker, ICD (cardiac defibrillator) or other implant: Bring the ID card.  An implanted stimulator: Bring the remote control.  A legal guardian: Bring a copy of the certified (court-stamped) guardianship papers.  Please remove any jewelry, including body piercings. Leave jewelry and other valuables at home.  If you're going home the day of surgery  You must have a responsible adult drive you home. They should stay with you overnight as well.  If you don't have someone to stay with you, and you aren't safe to go home alone, we may keep you overnight. Insurance often won't pay for this.  After surgery  If it's hard to control your pain or you need more pain medicine, please call your surgeon's office.  Questions?   If you have any questions for your care team, list them here: _________________________________________________________________________________________________________________________________________________________________________ ____________________________________ ____________________________________ ____________________________________  For informational purposes only. Not to replace the advice of your health care provider. Copyright   2003, 2019 [facility]. All rights reserved. Clinically reviewed by Sona Rizzo MD. [employer] [id number] - REV 12/22.    How to Take Your Medication Before Surgery  - Take all of your medications before surgery except as noted below  - HOLD (do not take) your LISINOPRIL on the evening before surgery.   - HOLD (do not take) METFORMIN the morning of surgery    Take 80% (23 units) of your glargine insulin the morning of surgery

## 2023-09-27 ENCOUNTER — ANESTHESIA EVENT (OUTPATIENT)
Dept: SURGERY | Facility: HOSPITAL | Age: 38
End: 2023-09-27
Payer: COMMERCIAL

## 2023-09-28 ENCOUNTER — ANESTHESIA (OUTPATIENT)
Dept: SURGERY | Facility: HOSPITAL | Age: 38
End: 2023-09-28
Payer: COMMERCIAL

## 2023-09-28 ENCOUNTER — HOSPITAL ENCOUNTER (OUTPATIENT)
Facility: HOSPITAL | Age: 38
Discharge: HOME | End: 2023-09-28
Attending: OTOLARYNGOLOGY | Admitting: OTOLARYNGOLOGY
Payer: COMMERCIAL

## 2023-09-28 VITALS
DIASTOLIC BLOOD PRESSURE: 86 MMHG | SYSTOLIC BLOOD PRESSURE: 138 MMHG | HEART RATE: 70 BPM | RESPIRATION RATE: 16 BRPM | OXYGEN SATURATION: 98 % | TEMPERATURE: 98.1 F

## 2023-09-28 DIAGNOSIS — Z98.890 S/P NASAL SEPTOPLASTY: Primary | ICD-10-CM

## 2023-09-28 LAB
GLUCOSE BLDC GLUCOMTR-MCNC: 142 MG/DL (ref 70–99)
GLUCOSE BLDC GLUCOMTR-MCNC: 180 MG/DL (ref 70–99)

## 2023-09-28 PROCEDURE — 250N000013 HC RX MED GY IP 250 OP 250 PS 637: Performed by: ANESTHESIOLOGY

## 2023-09-28 PROCEDURE — 250N000009 HC RX 250: Performed by: ANESTHESIOLOGY

## 2023-09-28 PROCEDURE — 710N000012 HC RECOVERY PHASE 2, PER MINUTE: Performed by: OTOLARYNGOLOGY

## 2023-09-28 PROCEDURE — 710N000009 HC RECOVERY PHASE 1, LEVEL 1, PER MIN: Performed by: OTOLARYNGOLOGY

## 2023-09-28 PROCEDURE — 250N000009 HC RX 250: Performed by: OTOLARYNGOLOGY

## 2023-09-28 PROCEDURE — 250N000011 HC RX IP 250 OP 636: Mod: JZ | Performed by: NURSE ANESTHETIST, CERTIFIED REGISTERED

## 2023-09-28 PROCEDURE — 30802 ABLATE INF TURBINATE SUBMUC: CPT | Performed by: OTOLARYNGOLOGY

## 2023-09-28 PROCEDURE — 999N000141 HC STATISTIC PRE-PROCEDURE NURSING ASSESSMENT: Performed by: OTOLARYNGOLOGY

## 2023-09-28 PROCEDURE — 250N000025 HC SEVOFLURANE, PER MIN: Performed by: OTOLARYNGOLOGY

## 2023-09-28 PROCEDURE — 370N000017 HC ANESTHESIA TECHNICAL FEE, PER MIN: Performed by: OTOLARYNGOLOGY

## 2023-09-28 PROCEDURE — 272N000001 HC OR GENERAL SUPPLY STERILE: Performed by: OTOLARYNGOLOGY

## 2023-09-28 PROCEDURE — 250N000013 HC RX MED GY IP 250 OP 250 PS 637: Performed by: OTOLARYNGOLOGY

## 2023-09-28 PROCEDURE — 30999 UNLISTED PROCEDURE NOSE: CPT | Performed by: OTOLARYNGOLOGY

## 2023-09-28 PROCEDURE — 250N000011 HC RX IP 250 OP 636: Performed by: ANESTHESIOLOGY

## 2023-09-28 PROCEDURE — 258N000003 HC RX IP 258 OP 636: Performed by: ANESTHESIOLOGY

## 2023-09-28 PROCEDURE — 360N000075 HC SURGERY LEVEL 2, PER MIN: Performed by: OTOLARYNGOLOGY

## 2023-09-28 PROCEDURE — 82962 GLUCOSE BLOOD TEST: CPT

## 2023-09-28 RX ORDER — FENTANYL CITRATE 50 UG/ML
INJECTION, SOLUTION INTRAMUSCULAR; INTRAVENOUS PRN
Status: DISCONTINUED | OUTPATIENT
Start: 2023-09-28 | End: 2023-09-28

## 2023-09-28 RX ORDER — HYDRALAZINE HYDROCHLORIDE 20 MG/ML
INJECTION INTRAMUSCULAR; INTRAVENOUS PRN
Status: DISCONTINUED | OUTPATIENT
Start: 2023-09-28 | End: 2023-09-28

## 2023-09-28 RX ORDER — ALBUTEROL SULFATE 90 UG/1
AEROSOL, METERED RESPIRATORY (INHALATION) PRN
Status: DISCONTINUED | OUTPATIENT
Start: 2023-09-28 | End: 2023-09-28

## 2023-09-28 RX ORDER — LIDOCAINE HYDROCHLORIDE AND EPINEPHRINE 10; 10 MG/ML; UG/ML
INJECTION, SOLUTION INFILTRATION; PERINEURAL PRN
Status: DISCONTINUED | OUTPATIENT
Start: 2023-09-28 | End: 2023-09-28 | Stop reason: HOSPADM

## 2023-09-28 RX ORDER — OXYCODONE AND ACETAMINOPHEN 5; 325 MG/1; MG/1
1-2 TABLET ORAL EVERY 4 HOURS PRN
Qty: 10 TABLET | Refills: 0 | Status: SHIPPED | OUTPATIENT
Start: 2023-09-28

## 2023-09-28 RX ORDER — MAGNESIUM HYDROXIDE 1200 MG/15ML
LIQUID ORAL PRN
Status: DISCONTINUED | OUTPATIENT
Start: 2023-09-28 | End: 2023-09-28 | Stop reason: HOSPADM

## 2023-09-28 RX ORDER — DEXMEDETOMIDINE HYDROCHLORIDE 4 UG/ML
INJECTION, SOLUTION INTRAVENOUS
Status: DISCONTINUED
Start: 2023-09-28 | End: 2023-09-28 | Stop reason: HOSPADM

## 2023-09-28 RX ORDER — FENTANYL CITRATE 50 UG/ML
25 INJECTION, SOLUTION INTRAMUSCULAR; INTRAVENOUS EVERY 5 MIN PRN
Status: DISCONTINUED | OUTPATIENT
Start: 2023-09-28 | End: 2023-09-28 | Stop reason: HOSPADM

## 2023-09-28 RX ORDER — ACETAMINOPHEN 325 MG/1
975 TABLET ORAL ONCE
Status: COMPLETED | OUTPATIENT
Start: 2023-09-28 | End: 2023-09-28

## 2023-09-28 RX ORDER — OXYMETAZOLINE HYDROCHLORIDE 0.05 G/100ML
SPRAY NASAL PRN
Status: DISCONTINUED | OUTPATIENT
Start: 2023-09-28 | End: 2023-09-28 | Stop reason: HOSPADM

## 2023-09-28 RX ORDER — SODIUM CHLORIDE, SODIUM LACTATE, POTASSIUM CHLORIDE, CALCIUM CHLORIDE 600; 310; 30; 20 MG/100ML; MG/100ML; MG/100ML; MG/100ML
INJECTION, SOLUTION INTRAVENOUS CONTINUOUS
Status: DISCONTINUED | OUTPATIENT
Start: 2023-09-28 | End: 2023-09-28 | Stop reason: HOSPADM

## 2023-09-28 RX ORDER — ONDANSETRON 4 MG/1
4 TABLET, ORALLY DISINTEGRATING ORAL EVERY 30 MIN PRN
Status: DISCONTINUED | OUTPATIENT
Start: 2023-09-28 | End: 2023-09-28 | Stop reason: HOSPADM

## 2023-09-28 RX ORDER — DEXAMETHASONE SODIUM PHOSPHATE 10 MG/ML
INJECTION, SOLUTION INTRAMUSCULAR; INTRAVENOUS PRN
Status: DISCONTINUED | OUTPATIENT
Start: 2023-09-28 | End: 2023-09-28

## 2023-09-28 RX ORDER — PROPOFOL 10 MG/ML
INJECTION, EMULSION INTRAVENOUS CONTINUOUS PRN
Status: DISCONTINUED | OUTPATIENT
Start: 2023-09-28 | End: 2023-09-28

## 2023-09-28 RX ORDER — HYDROMORPHONE HYDROCHLORIDE 1 MG/ML
0.2 INJECTION, SOLUTION INTRAMUSCULAR; INTRAVENOUS; SUBCUTANEOUS EVERY 5 MIN PRN
Status: DISCONTINUED | OUTPATIENT
Start: 2023-09-28 | End: 2023-09-28 | Stop reason: HOSPADM

## 2023-09-28 RX ORDER — MUPIROCIN 20 MG/G
OINTMENT TOPICAL
Qty: 22 G | Refills: 0 | Status: SHIPPED | OUTPATIENT
Start: 2023-09-28

## 2023-09-28 RX ORDER — PROPOFOL 10 MG/ML
INJECTION, EMULSION INTRAVENOUS PRN
Status: DISCONTINUED | OUTPATIENT
Start: 2023-09-28 | End: 2023-09-28

## 2023-09-28 RX ORDER — ONDANSETRON 2 MG/ML
4 INJECTION INTRAMUSCULAR; INTRAVENOUS EVERY 30 MIN PRN
Status: DISCONTINUED | OUTPATIENT
Start: 2023-09-28 | End: 2023-09-28 | Stop reason: HOSPADM

## 2023-09-28 RX ORDER — HYDROMORPHONE HYDROCHLORIDE 1 MG/ML
0.4 INJECTION, SOLUTION INTRAMUSCULAR; INTRAVENOUS; SUBCUTANEOUS EVERY 5 MIN PRN
Status: DISCONTINUED | OUTPATIENT
Start: 2023-09-28 | End: 2023-09-28 | Stop reason: HOSPADM

## 2023-09-28 RX ORDER — FENTANYL CITRATE 50 UG/ML
50 INJECTION, SOLUTION INTRAMUSCULAR; INTRAVENOUS EVERY 5 MIN PRN
Status: DISCONTINUED | OUTPATIENT
Start: 2023-09-28 | End: 2023-09-28 | Stop reason: HOSPADM

## 2023-09-28 RX ORDER — LIDOCAINE 40 MG/G
CREAM TOPICAL
Status: DISCONTINUED | OUTPATIENT
Start: 2023-09-28 | End: 2023-09-28 | Stop reason: HOSPADM

## 2023-09-28 RX ORDER — LIDOCAINE HYDROCHLORIDE 10 MG/ML
INJECTION, SOLUTION INFILTRATION; PERINEURAL PRN
Status: DISCONTINUED | OUTPATIENT
Start: 2023-09-28 | End: 2023-09-28

## 2023-09-28 RX ORDER — ECHINACEA PURPUREA EXTRACT 125 MG
TABLET ORAL
Qty: 30 ML | Refills: 0 | Status: SHIPPED | OUTPATIENT
Start: 2023-09-28

## 2023-09-28 RX ORDER — GLYCOPYRROLATE 0.2 MG/ML
INJECTION, SOLUTION INTRAMUSCULAR; INTRAVENOUS PRN
Status: DISCONTINUED | OUTPATIENT
Start: 2023-09-28 | End: 2023-09-28

## 2023-09-28 RX ORDER — DEXAMETHASONE SODIUM PHOSPHATE 10 MG/ML
10 INJECTION, SOLUTION INTRAMUSCULAR; INTRAVENOUS ONCE
Status: DISCONTINUED | OUTPATIENT
Start: 2023-09-28 | End: 2023-09-28 | Stop reason: HOSPADM

## 2023-09-28 RX ORDER — ONDANSETRON 2 MG/ML
INJECTION INTRAMUSCULAR; INTRAVENOUS PRN
Status: DISCONTINUED | OUTPATIENT
Start: 2023-09-28 | End: 2023-09-28

## 2023-09-28 RX ORDER — OXYMETAZOLINE HYDROCHLORIDE 0.05 G/100ML
2 SPRAY NASAL ONCE
Status: COMPLETED | OUTPATIENT
Start: 2023-09-28 | End: 2023-09-28

## 2023-09-28 RX ORDER — OXYCODONE AND ACETAMINOPHEN 5; 325 MG/1; MG/1
1-2 TABLET ORAL EVERY 4 HOURS PRN
Status: DISCONTINUED | OUTPATIENT
Start: 2023-09-28 | End: 2023-09-28 | Stop reason: HOSPADM

## 2023-09-28 RX ORDER — LABETALOL HYDROCHLORIDE 5 MG/ML
INJECTION, SOLUTION INTRAVENOUS PRN
Status: DISCONTINUED | OUTPATIENT
Start: 2023-09-28 | End: 2023-09-28

## 2023-09-28 RX ADMIN — OXYMETAZOLINE HYDROCHLORIDE 2 SPRAY: 0.05 SPRAY NASAL at 07:29

## 2023-09-28 RX ADMIN — ROCURONIUM BROMIDE 50 MG: 50 INJECTION, SOLUTION INTRAVENOUS at 07:45

## 2023-09-28 RX ADMIN — GLYCOPYRROLATE 0.1 MG: 0.2 INJECTION INTRAMUSCULAR; INTRAVENOUS at 07:45

## 2023-09-28 RX ADMIN — PROPOFOL 50 MG: 10 INJECTION, EMULSION INTRAVENOUS at 08:07

## 2023-09-28 RX ADMIN — LABETALOL HYDROCHLORIDE 5 MG: 5 INJECTION, SOLUTION INTRAVENOUS at 08:42

## 2023-09-28 RX ADMIN — DEXMEDETOMIDINE HYDROCHLORIDE 4 MCG: 100 INJECTION, SOLUTION INTRAVENOUS at 08:03

## 2023-09-28 RX ADMIN — DEXMEDETOMIDINE HYDROCHLORIDE 4 MCG: 100 INJECTION, SOLUTION INTRAVENOUS at 08:08

## 2023-09-28 RX ADMIN — PROPOFOL 100 MG: 10 INJECTION, EMULSION INTRAVENOUS at 07:47

## 2023-09-28 RX ADMIN — OXYMETAZOLINE HYDROCHLORIDE 2 SPRAY: 0.05 SPRAY NASAL at 07:00

## 2023-09-28 RX ADMIN — SODIUM CHLORIDE, POTASSIUM CHLORIDE, SODIUM LACTATE AND CALCIUM CHLORIDE: 600; 310; 30; 20 INJECTION, SOLUTION INTRAVENOUS at 06:56

## 2023-09-28 RX ADMIN — PROPOFOL 40 MCG/KG/MIN: 10 INJECTION, EMULSION INTRAVENOUS at 08:00

## 2023-09-28 RX ADMIN — DEXMEDETOMIDINE HYDROCHLORIDE 4 MCG: 100 INJECTION, SOLUTION INTRAVENOUS at 07:45

## 2023-09-28 RX ADMIN — LABETALOL HYDROCHLORIDE 5 MG: 5 INJECTION, SOLUTION INTRAVENOUS at 08:40

## 2023-09-28 RX ADMIN — GLYCOPYRROLATE 0.1 MG: 0.2 INJECTION INTRAMUSCULAR; INTRAVENOUS at 07:47

## 2023-09-28 RX ADMIN — DEXMEDETOMIDINE HYDROCHLORIDE 4 MCG: 100 INJECTION, SOLUTION INTRAVENOUS at 07:46

## 2023-09-28 RX ADMIN — OXYCODONE HYDROCHLORIDE AND ACETAMINOPHEN 2 TABLET: 5; 325 TABLET ORAL at 09:41

## 2023-09-28 RX ADMIN — PROPOFOL 50 MG: 10 INJECTION, EMULSION INTRAVENOUS at 08:14

## 2023-09-28 RX ADMIN — DEXMEDETOMIDINE HYDROCHLORIDE 8 MCG: 100 INJECTION, SOLUTION INTRAVENOUS at 08:12

## 2023-09-28 RX ADMIN — HYDROMORPHONE HYDROCHLORIDE 1 MG: 1 INJECTION, SOLUTION INTRAMUSCULAR; INTRAVENOUS; SUBCUTANEOUS at 07:57

## 2023-09-28 RX ADMIN — DEXMEDETOMIDINE HYDROCHLORIDE 8 MCG: 100 INJECTION, SOLUTION INTRAVENOUS at 07:57

## 2023-09-28 RX ADMIN — FENTANYL CITRATE 100 MCG: 50 INJECTION, SOLUTION INTRAMUSCULAR; INTRAVENOUS at 07:45

## 2023-09-28 RX ADMIN — DEXAMETHASONE SODIUM PHOSPHATE 10 MG: 10 INJECTION, SOLUTION INTRAMUSCULAR; INTRAVENOUS at 07:45

## 2023-09-28 RX ADMIN — ALBUTEROL SULFATE 2 PUFF: 90 AEROSOL, METERED RESPIRATORY (INHALATION) at 08:27

## 2023-09-28 RX ADMIN — ONDANSETRON 4 MG: 2 INJECTION INTRAMUSCULAR; INTRAVENOUS at 08:19

## 2023-09-28 RX ADMIN — DEXMEDETOMIDINE HYDROCHLORIDE 4 MCG: 100 INJECTION, SOLUTION INTRAVENOUS at 08:06

## 2023-09-28 RX ADMIN — SODIUM CHLORIDE, POTASSIUM CHLORIDE, SODIUM LACTATE AND CALCIUM CHLORIDE: 600; 310; 30; 20 INJECTION, SOLUTION INTRAVENOUS at 08:40

## 2023-09-28 RX ADMIN — PROPOFOL 200 MG: 10 INJECTION, EMULSION INTRAVENOUS at 07:45

## 2023-09-28 RX ADMIN — SUGAMMADEX 300 MG: 100 INJECTION, SOLUTION INTRAVENOUS at 08:24

## 2023-09-28 RX ADMIN — LIDOCAINE HYDROCHLORIDE 50 MG: 10 INJECTION, SOLUTION INFILTRATION; PERINEURAL at 07:45

## 2023-09-28 RX ADMIN — HYDRALAZINE HYDROCHLORIDE 10 MG: 20 INJECTION INTRAMUSCULAR; INTRAVENOUS at 08:46

## 2023-09-28 RX ADMIN — OXYMETAZOLINE HYDROCHLORIDE 2 SPRAY: 0.05 SPRAY NASAL at 06:39

## 2023-09-28 RX ADMIN — DEXMEDETOMIDINE HYDROCHLORIDE 4 MCG: 100 INJECTION, SOLUTION INTRAVENOUS at 07:48

## 2023-09-28 RX ADMIN — ACETAMINOPHEN 975 MG: 325 TABLET ORAL at 06:39

## 2023-09-28 RX ADMIN — MIDAZOLAM 2 MG: 1 INJECTION INTRAMUSCULAR; INTRAVENOUS at 07:36

## 2023-09-28 ASSESSMENT — ACTIVITIES OF DAILY LIVING (ADL)
ADLS_ACUITY_SCORE: 18
ADLS_ACUITY_SCORE: 18

## 2023-09-28 NOTE — ANESTHESIA POSTPROCEDURE EVALUATION
Patient: Jorge CARRILLO Madrigal    Procedure: Procedure(s):  SEPTOPLASTY, NOSE, WITH TURBINOPLASTY       Anesthesia Type:  General    Note:  Disposition: Outpatient   Postop Pain Control: Uneventful            Sign Out: Well controlled pain   PONV: No   Neuro/Psych: Uneventful            Sign Out: Acceptable/Baseline neuro status   Airway/Respiratory: Uneventful            Sign Out: Acceptable/Baseline resp. status   CV/Hemodynamics: Uneventful            Sign Out: Acceptable CV status; No obvious hypovolemia; No obvious fluid overload   Other NRE: NONE   DID A NON-ROUTINE EVENT OCCUR? No           Last vitals:  Vitals Value Taken Time   /72 09/28/23 0930   Temp 36.7  C (98.1  F) 09/28/23 0930   Pulse 70 09/28/23 0934   Resp 31 09/28/23 0932   SpO2 97 % 09/28/23 0934   Vitals shown include unvalidated device data.    Electronically Signed By: Juan Heath MD  September 28, 2023  11:07 AM

## 2023-09-28 NOTE — DISCHARGE INSTRUCTIONS
Start nasal saline the day of  surgery.    Start mupirocin ointment the day of surgery  Avoid nose blowing 1 week  Sleep with head elevated on several pillows for the first 3 nights to help with swelling  You may use ice or cold packs to face as needed to reduce pain/swelling (20 minutes on, then 20 minutes off)  Follow up appointment in 1 week  Call [phone] if you are not contacted by my office

## 2023-09-28 NOTE — ANESTHESIA CARE TRANSFER NOTE
Patient: Jorge CARRILLO Madrigal    Procedure: Procedure(s):  SEPTOPLASTY, NOSE, WITH TURBINOPLASTY       Diagnosis: Deviated nasal septum [J34.2]  Nasal turbinate hypertrophy [J34.3]  Diagnosis Additional Information: No value filed.    Anesthesia Type:   General     Note:    Oropharynx: oropharynx clear of all foreign objects and spontaneously breathing  Level of Consciousness: awake  Oxygen Supplementation: face mask  Level of Supplemental Oxygen (L/min / FiO2): 6  Independent Airway: airway patency satisfactory and stable    Vital Signs Stable: post-procedure vital signs reviewed and stable  Report to RN Given: handoff report given  Patient transferred to: PACU  Comments: .  .  Patient  was HTN during and post  emergence / extubation. Denied pain and denied nausea. Labetolol  and Hydralazine given  with  good results. Patient  left in PACU in no distress. Trace blood  from nares. Nasal sling bandage in place.    .  .    Handoff Report: Identifed the Patient, Identified the Reponsible Provider, Reviewed the pertinent medical history, Discussed the surgical course, Reviewed Intra-OP anesthesia mangement and issues during anesthesia, Set expectations for post-procedure period and Allowed opportunity for questions and acknowledgement of understanding      Vitals:  Vitals Value Taken Time   /103 09/28/23 0847   Temp 36.2  C (97.2  F) 09/28/23 0846   Pulse 59 09/28/23 0852   Resp 9 09/28/23 0852   SpO2 98 % 09/28/23 0852   Vitals shown include unvalidated device data.    Electronically Signed By: FLAKITO Lombardi CRNA  September 28, 2023  8:53 AM

## 2023-09-28 NOTE — ANESTHESIA PROCEDURE NOTES
Airway       Patient location during procedure: OR       Procedure Start/Stop Times: 9/28/2023 7:51 AM  Staff -        Anesthesiologist:  Juan Heath MD       CRNA: Dustin Mc APRN CRNA       Other Anesthesia Staff: Juan Gtz       Performed By: SRNA  Consent for Airway        Urgency: elective  Indications and Patient Condition       Indications for airway management: andrade-procedural       Induction type:intravenous       Mask difficulty assessment: 1 - vent by mask    Final Airway Details       Final airway type: endotracheal airway       Successful airway: ETT - single, COLLEEN and Oral  Endotracheal Airway Details        ETT size (mm): 8.0       Cuffed: yes       Successful intubation technique: video laryngoscopy       VL Blade Size: Glidescope 4       Grade View of Cords: 1       Adjucts: stylet       Position: Right       Measured from: lips (bend)       Bite block used: None    Post intubation assessment        Placement verified by: capnometry, equal breath sounds and chest rise        Number of attempts at approach: 1       Number of other approaches attempted: 0       Secured with: silk tape       Ease of procedure: easy       Dentition: Intact and Unchanged    Medication(s) Administered   Medication Administration Time: 9/28/2023 7:51 AM

## 2023-09-28 NOTE — OP NOTE
Otolaryngology Full Operative Report    Date of Operation:  03/25/21        Pre-operative Diagnosis:  1. Deviated Nasal Septum  2. Inferior Turbinate Hypertrophy  3. Nasal Obstruction   Post-operative Diagnosis:  same  Procedure(s): Endoscopic Assisted Nasal Septoplasty 2.  Submucous Resection of Inferior Turbinates    Surgeon:  Joseph Juan MD    Anesthesia:  Gen ET    Indications for Procedure:  The risks and the benefits of the procedure, including but not limited to, post operative bleeding were discussed with the patient. A consent form was then signed and placed into the chart.    Procedure in Detail:  The patient was brought into the operating room and placed on the table in a supine position. Anesthesia successfully intubated without any difficulties, the head of the bed was turned 90 degrees, and a time out was performed with all pertinent personnel present.     Anterior nasal septum was injected with 1% lidocaine with epinephrine 1 100,000.  Afrin-soaked pledgets were placed on each side    Attention was turned to the right posterior spur.  The mucopericondrium was injected with an additional 2 ml of 1% lidocaine with epi at 1:100,000.  Next the septal TRACT balloon was inflated for 30 seconds (x2) to 8 ERLIN with good effect.  The cartilaginous spur was the taken down using adi-cut through biting forceps.       Attention then turned to the inferior turbinates.  Using the PTR wand at seven 6-2 (30 second) passes were made cephalad and caudad of the LEFT inferior turbinate with good effect.  O the right side, only one pass was performed at 6/2.       Findings:  Septal cartilage deviated to right (see photos)               EBL: approximately 5 mL     Complications:  none    Disposition: The patient was extubated in the operating room and was transferred to the PACU in stable condition.     Joseph Juan MD

## 2023-09-28 NOTE — ANESTHESIA PREPROCEDURE EVALUATION
Anesthesia Pre-Procedure Evaluation    Patient: Jorge Madrigal   MRN: [id number] : 1985        Procedure : Procedure(s):  SEPTOPLASTY, NOSE, WITH TURBINOPLASTY          Past Medical History:   Diagnosis Date    Alcohol dependence in remission (H)     Asthma     Congenital nasal septum deviation     Depressive disorder     GERD (gastroesophageal reflux disease)     Hypertension 2023    Infection due to 2019 novel coronavirus 2022    Obese     Recurrent major depression in complete remission (H)     Type 2 diabetes mellitus with hyperglycemia, without long-term current use of insulin (H) 2023      Past Surgical History:   Procedure Laterality Date    COLONOSCOPY      Normal      No Known Allergies   Social History     Tobacco Use    Smoking status: Never    Smokeless tobacco: Never    Tobacco comments:     Occasionally   Substance Use Topics    Alcohol use: Not Currently      Wt Readings from Last 1 Encounters:   23 114 kg (251 lb 6.4 oz)        Anesthesia Evaluation            ROS/MED HX  ENT/Pulmonary:     (+)                     asthma                  Neurologic:       Cardiovascular:     (+)  hypertension- -   -  - -                                      METS/Exercise Tolerance:     Hematologic:  - neg hematologic  ROS     Musculoskeletal:  - neg musculoskeletal ROS     GI/Hepatic:     (+) GERD,                   Renal/Genitourinary:  - neg Renal ROS     Endo:     (+)  type II DM,             Obesity,       Psychiatric/Substance Use:     (+) psychiatric history depression alcohol abuse      Infectious Disease:       Malignancy:       Other:            Physical Exam    Airway  airway exam normal      Mallampati: II   TM distance: > 3 FB   Neck ROM: full   Mouth opening: > 3 cm    Respiratory Devices and Support         Dental       (+) Completely normal teeth      Cardiovascular   cardiovascular exam normal       Rhythm and rate: regular and normal     Pulmonary   pulmonary exam  normal        breath sounds clear to auscultation           OUTSIDE LABS:  CBC:   Lab Results   Component Value Date    WBC 6.9 01/16/2020    HGB 14.6 09/22/2023    HGB 15.2 01/16/2020    HCT 44.5 01/16/2020     01/16/2020     BMP:   Lab Results   Component Value Date     09/22/2023     (L) 07/28/2023    POTASSIUM 4.2 09/22/2023    POTASSIUM 3.8 07/28/2023    CHLORIDE 102 09/22/2023    CHLORIDE 98 07/28/2023    CO2 25 09/22/2023    CO2 22 07/28/2023    BUN 14.1 09/22/2023    BUN 10.2 07/28/2023    CR 0.93 09/22/2023    CR 0.90 07/28/2023     (H) 09/28/2023     (H) 09/22/2023     COAGS: No results found for: PTT, INR, FIBR  POC: No results found for: BGM, HCG, HCGS  HEPATIC:   Lab Results   Component Value Date    ALBUMIN 4.2 01/16/2020    PROTTOTAL 7.7 01/16/2020    ALT 42 01/16/2020    AST 14 01/16/2020    ALKPHOS 73 01/16/2020    BILITOTAL 0.4 01/16/2020     OTHER:   Lab Results   Component Value Date    A1C 11.3 (H) 07/28/2023    MARIA LUISA 8.8 09/22/2023       Anesthesia Plan    ASA Status:  2    NPO Status:  NPO Appropriate    Anesthesia Type: General.     - Airway: ETT   Induction: Intravenous, Propofol.   Maintenance: Inhalation.        Consents    Anesthesia Plan(s) and associated risks, benefits, and realistic alternatives discussed. Questions answered and patient/representative(s) expressed understanding.     - Discussed: Risks, Benefits and Alternatives for BOTH SEDATION and the PROCEDURE were discussed     - Discussed with:  Patient      - Extended Intubation/Ventilatory Support Discussed: No.           Postoperative Care    Pain management: IV analgesics, Oral pain medications.   PONV prophylaxis: Ondansetron (or other 5HT-3), Dexamethasone or Solumedrol     Comments:                Juan Heath MD

## 2023-09-28 NOTE — INTERVAL H&P NOTE
I have reviewed the surgical (or preoperative) H&P that is linked to this encounter, and examined the patient. There are no significant changes    Clinical Conditions Present on Arrival:  Clinically Significant Risk Factors Present on Admission                  # DMII: A1C = 11.3 % (Ref range: 0.0 - 5.6 %) within past 6 months  # Obesity: Estimated body mass index is 34.1 kg/m  as calculated from the following:    Height as of 9/22/23: 1.829 m (6').    Weight as of 9/22/23: 114 kg (251 lb 6.4 oz).

## 2023-10-27 ENCOUNTER — VIRTUAL VISIT (OUTPATIENT)
Dept: EDUCATION SERVICES | Facility: CLINIC | Age: 38
End: 2023-10-27
Payer: COMMERCIAL

## 2023-10-27 DIAGNOSIS — E11.65 TYPE 2 DIABETES MELLITUS WITH HYPERGLYCEMIA, WITHOUT LONG-TERM CURRENT USE OF INSULIN (H): Primary | ICD-10-CM

## 2023-10-27 PROCEDURE — 98966 PH1 ASSMT&MGMT NQHP 5-10: CPT | Mod: 93 | Performed by: NUTRITIONIST
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
## 2023-10-29 NOTE — PROGRESS NOTES
CHIEF COMPLAINT:  Patient presents with:  Post-op Visit: Septo with Turbinoplasty, 09/28/23, No concerns at this time         HISTORY OF PRESENT ILLNESS    Jorge was seen in follow up after previous 6/16/2023 visit for post operative check.      Case Time: Procedures: Surgeons:   9/28/2023  8:04 AM SEPTOPLASTY, NOSE, WITH TURBINOPLASTY             REVIEW OF SYSTEMS    Review of Systems: a 10-system review is reviewed at this encounter.  See scanned document.       No Known Allergies        PHYSICAL EXAM:        HEAD: Normal appearance and symmetry:  No cutaneous lesions.     NOSE:    Dorsum:   straight  Septum: midline  Turbinates: 1+ right 2+ left  Mucosa: minor crusting right anterior septum       ORAL CAVITY/OROPHARYNX:    Lips:  Normal.     NECK:  Trachea:  midline     NEURO:   Alert and Oriented    GAIT AND STATION:  normal     RESPIRATORY:   Symmetry and Respiratory effort    PSYCH:   normal mood and affect    SKIN:  warm and dry         IMPRESSION:   Encounter Diagnoses   Name Primary?    Postoperative state Yes    S/P nasal septoplasty     Nasal crusting           RECOMMENDATIONS:    No orders of the defined types were placed in this encounter.     No orders of the defined types were placed in this encounter.

## 2023-10-30 ENCOUNTER — OFFICE VISIT (OUTPATIENT)
Dept: OTOLARYNGOLOGY | Facility: CLINIC | Age: 38
End: 2023-10-30
Payer: COMMERCIAL

## 2023-10-30 DIAGNOSIS — Z98.890 POSTOPERATIVE STATE: Primary | ICD-10-CM

## 2023-10-30 DIAGNOSIS — Z98.890 S/P NASAL SEPTOPLASTY: ICD-10-CM

## 2023-10-30 DIAGNOSIS — J34.89 NASAL CRUSTING: ICD-10-CM

## 2023-10-30 PROCEDURE — 99024 POSTOP FOLLOW-UP VISIT: CPT | Performed by: OTOLARYNGOLOGY

## 2023-10-30 NOTE — LETTER
10/30/2023         RE: Jorge Madrigal  [address] [facility] [address]        Dear Colleague,    Thank you for referring your patient, Jorge Madrigal, to the [facility]. Please see a copy of my visit note below.    CHIEF COMPLAINT:  Patient presents with:  Post-op Visit: Septo with Turbinoplasty, 09/28/23, No concerns at this time         HISTORY OF PRESENT ILLNESS    Jorge was seen in follow up after previous 6/16/2023 visit for post operative check.      Case Time: Procedures: Surgeons:   9/28/2023  8:04 AM SEPTOPLASTY, NOSE, WITH TURBINOPLASTY             REVIEW OF SYSTEMS    Review of Systems: a 10-system review is reviewed at this encounter.  See scanned document.       No Known Allergies        PHYSICAL EXAM:        HEAD: Normal appearance and symmetry:  No cutaneous lesions.     NOSE:    Dorsum:   straight  Septum: midline  Turbinates: 1+ right 2+ left  Mucosa: minor crusting right anterior septum       ORAL CAVITY/OROPHARYNX:    Lips:  Normal.     NECK:  Trachea:  midline     NEURO:   Alert and Oriented    GAIT AND STATION:  normal     RESPIRATORY:   Symmetry and Respiratory effort    PSYCH:   normal mood and affect    SKIN:  warm and dry         IMPRESSION:   Encounter Diagnoses   Name Primary?     Postoperative state Yes     S/P nasal septoplasty      Nasal crusting           RECOMMENDATIONS:    No orders of the defined types were placed in this encounter.     No orders of the defined types were placed in this encounter.        Again, thank you for allowing me to participate in the care of your patient.        Sincerely,        Joseph Juan MD

## 2023-11-07 ENCOUNTER — MYC MEDICAL ADVICE (OUTPATIENT)
Dept: INTERNAL MEDICINE | Facility: CLINIC | Age: 38
End: 2023-11-07
Payer: COMMERCIAL

## 2023-11-07 DIAGNOSIS — Z79.4 TYPE 2 DIABETES MELLITUS WITH HYPERGLYCEMIA, WITH LONG-TERM CURRENT USE OF INSULIN (H): ICD-10-CM

## 2023-11-07 DIAGNOSIS — E11.65 TYPE 2 DIABETES MELLITUS WITH HYPERGLYCEMIA, WITH LONG-TERM CURRENT USE OF INSULIN (H): ICD-10-CM

## 2023-11-16 ENCOUNTER — MYC MEDICAL ADVICE (OUTPATIENT)
Dept: INTERNAL MEDICINE | Facility: CLINIC | Age: 38
End: 2023-11-16
Payer: COMMERCIAL

## 2023-11-16 DIAGNOSIS — I10 BENIGN ESSENTIAL HYPERTENSION: ICD-10-CM

## 2023-11-16 NOTE — TELEPHONE ENCOUNTER
MR, see notes below regarding pt's medication questions, most medications filled for the year at 09/22/2023 appointment.  Pt needs refill of lisinopril if dosing is still appropriate    Daniella Bullock CMA

## 2023-11-17 RX ORDER — LISINOPRIL 10 MG/1
10 TABLET ORAL AT BEDTIME
Qty: 90 TABLET | Refills: 0 | Status: SHIPPED | OUTPATIENT
Start: 2023-11-17

## 2023-12-17 ENCOUNTER — HEALTH MAINTENANCE LETTER (OUTPATIENT)
Age: 38
End: 2023-12-17

## 2024-01-04 DIAGNOSIS — J45.20 MILD INTERMITTENT ASTHMA, UNSPECIFIED WHETHER COMPLICATED: ICD-10-CM

## 2024-01-04 RX ORDER — MONTELUKAST SODIUM 10 MG/1
1 TABLET ORAL AT BEDTIME
Qty: 90 TABLET | Refills: 1 | Status: SHIPPED | OUTPATIENT
Start: 2024-01-04 | End: 2024-07-08

## 2024-01-17 ENCOUNTER — PATIENT OUTREACH (OUTPATIENT)
Dept: CARE COORDINATION | Facility: CLINIC | Age: 39
End: 2024-01-17
Payer: COMMERCIAL

## 2024-01-31 ENCOUNTER — PATIENT OUTREACH (OUTPATIENT)
Dept: CARE COORDINATION | Facility: CLINIC | Age: 39
End: 2024-01-31
Payer: COMMERCIAL

## 2024-04-29 DIAGNOSIS — J45.40 MODERATE PERSISTENT ASTHMA WITHOUT COMPLICATION: ICD-10-CM

## 2024-04-29 RX ORDER — FLUTICASONE PROPIONATE AND SALMETEROL 250; 50 UG/1; UG/1
POWDER RESPIRATORY (INHALATION)
Qty: 1 EACH | Refills: 0 | Status: SHIPPED | OUTPATIENT
Start: 2024-04-29 | End: 2024-05-28

## 2024-05-05 ENCOUNTER — HEALTH MAINTENANCE LETTER (OUTPATIENT)
Age: 39
End: 2024-05-05

## 2024-05-13 DIAGNOSIS — J45.20 MILD INTERMITTENT ASTHMA, UNSPECIFIED WHETHER COMPLICATED: ICD-10-CM

## 2024-05-13 RX ORDER — ALBUTEROL SULFATE 90 UG/1
2 AEROSOL, METERED RESPIRATORY (INHALATION) EVERY 4 HOURS PRN
Qty: 18 G | Refills: 0 | Status: SHIPPED | OUTPATIENT
Start: 2024-05-13 | End: 2024-07-11

## 2024-05-28 DIAGNOSIS — J45.40 MODERATE PERSISTENT ASTHMA WITHOUT COMPLICATION: ICD-10-CM

## 2024-05-28 RX ORDER — FLUTICASONE PROPIONATE AND SALMETEROL 250; 50 UG/1; UG/1
POWDER RESPIRATORY (INHALATION)
Qty: 1 EACH | Refills: 0 | Status: SHIPPED | OUTPATIENT
Start: 2024-05-28 | End: 2024-06-25

## 2024-06-25 DIAGNOSIS — J45.40 MODERATE PERSISTENT ASTHMA WITHOUT COMPLICATION: ICD-10-CM

## 2024-06-25 RX ORDER — FLUTICASONE PROPIONATE AND SALMETEROL 250; 50 UG/1; UG/1
POWDER RESPIRATORY (INHALATION)
Qty: 1 EACH | Refills: 0 | Status: SHIPPED | OUTPATIENT
Start: 2024-06-25

## 2024-07-08 DIAGNOSIS — J45.20 MILD INTERMITTENT ASTHMA, UNSPECIFIED WHETHER COMPLICATED: ICD-10-CM

## 2024-07-08 RX ORDER — MONTELUKAST SODIUM 10 MG/1
1 TABLET ORAL AT BEDTIME
Qty: 90 TABLET | Refills: 0 | Status: SHIPPED | OUTPATIENT
Start: 2024-07-08

## 2024-07-11 DIAGNOSIS — J45.20 MILD INTERMITTENT ASTHMA, UNSPECIFIED WHETHER COMPLICATED: ICD-10-CM

## 2024-07-11 RX ORDER — ALBUTEROL SULFATE 90 UG/1
2 AEROSOL, METERED RESPIRATORY (INHALATION) EVERY 4 HOURS PRN
Qty: 18 G | Refills: 0 | Status: SHIPPED | OUTPATIENT
Start: 2024-07-11 | End: 2024-07-30

## 2024-07-30 DIAGNOSIS — J45.20 MILD INTERMITTENT ASTHMA, UNSPECIFIED WHETHER COMPLICATED: ICD-10-CM

## 2024-07-30 RX ORDER — ALBUTEROL SULFATE 90 UG/1
2 AEROSOL, METERED RESPIRATORY (INHALATION) EVERY 4 HOURS PRN
Qty: 18 G | Refills: 11 | Status: SHIPPED | OUTPATIENT
Start: 2024-07-30

## 2024-08-24 DIAGNOSIS — E11.65 TYPE 2 DIABETES MELLITUS WITH HYPERGLYCEMIA, WITHOUT LONG-TERM CURRENT USE OF INSULIN (H): ICD-10-CM

## 2024-09-22 ENCOUNTER — HEALTH MAINTENANCE LETTER (OUTPATIENT)
Age: 39
End: 2024-09-22

## 2024-10-29 DIAGNOSIS — J45.40 MODERATE PERSISTENT ASTHMA WITHOUT COMPLICATION: ICD-10-CM

## 2024-10-29 RX ORDER — FLUTICASONE PROPIONATE AND SALMETEROL 250; 50 UG/1; UG/1
POWDER RESPIRATORY (INHALATION)
Qty: 60 EACH | Refills: 0 | Status: SHIPPED | OUTPATIENT
Start: 2024-10-29

## 2024-10-30 NOTE — TELEPHONE ENCOUNTER
Call pt- 30 d fill approved.  f/u appt needed before further refills approved.    May schedule in next day, same day or virtual-release slots

## 2024-11-05 NOTE — TELEPHONE ENCOUNTER
Spoke with Pt. Pt no longer sees anyone in [place] due to insurance change and now goes to [facility].       Odalis Lang VF

## 2024-11-25 DIAGNOSIS — J45.20 MILD INTERMITTENT ASTHMA, UNSPECIFIED WHETHER COMPLICATED: ICD-10-CM

## 2024-11-25 RX ORDER — ALBUTEROL SULFATE 0.63 MG/3ML
SOLUTION RESPIRATORY (INHALATION)
Qty: 75 ML | Refills: 0 | OUTPATIENT
Start: 2024-11-25

## 2024-11-26 NOTE — TELEPHONE ENCOUNTER
MR, see message below from previous encounter    November 5, 2024  Odalis Lang   TB    11/5/24  4:53 PM  Note  Spoke with Pt. Pt no longer sees anyone in [place] due to insurance change and now goes to [facility].         Odalis Lang VF

## 2025-01-12 ENCOUNTER — HEALTH MAINTENANCE LETTER (OUTPATIENT)
Age: 40
End: 2025-01-12

## 2025-02-15 ENCOUNTER — HEALTH MAINTENANCE LETTER (OUTPATIENT)
Age: 40
End: 2025-02-15

## 2025-04-26 ENCOUNTER — HEALTH MAINTENANCE LETTER (OUTPATIENT)
Age: 40
End: 2025-04-26

## 2025-08-09 ENCOUNTER — HEALTH MAINTENANCE LETTER (OUTPATIENT)
Age: 40
End: 2025-08-09

## (undated) DEVICE — WAND ELECTROSURGICAL REFLEX ULTRA SML DIA EICA4835-01

## (undated) DEVICE — SINUS BALL INFLATION DEVICE BID30

## (undated) DEVICE — GLOVE SURG PI ULTRA TOUCH M SZ 7-1/2 LF

## (undated) DEVICE — DRSG TEGADERM 2 3/8X2 3/4" 1624W

## (undated) DEVICE — PREP DYNA-HEX 4% CHG SCRUB 4OZ BOTTLE MDS098710

## (undated) DEVICE — DRSG GAUZE 4X4" 8044

## (undated) DEVICE — KIT DEFOGGING-FOG INHIBITOR FOG1001

## (undated) DEVICE — DRAPE SLEEVE 599

## (undated) DEVICE — SYR 03ML LL W/O NDL 309657

## (undated) DEVICE — TRAY PREP DRY SKIN SCRUB 067

## (undated) DEVICE — DECANTER VIAL 2006S

## (undated) DEVICE — TUBING SUCTION MEDI-VAC 1/4"X20' N620A - HE

## (undated) DEVICE — SOL WATER IRRIG 1000ML BOTTLE 2F7114

## (undated) DEVICE — NDL 27GA 1 1/2" 1188827112

## (undated) DEVICE — DRAPE U SPLIT 74X120" 29440

## (undated) DEVICE — SPONGE NEURO 1/2X3 WECK 200104

## (undated) DEVICE — NDS RELIEVA TRACT 16MM X 40MM RT1640A

## (undated) DEVICE — CUSTOM PACK MINOR SBA5BMNHEA

## (undated) RX ORDER — PROPOFOL 10 MG/ML
INJECTION, EMULSION INTRAVENOUS
Status: DISPENSED
Start: 2023-09-28

## (undated) RX ORDER — FENTANYL CITRATE 50 UG/ML
INJECTION, SOLUTION INTRAMUSCULAR; INTRAVENOUS
Status: DISPENSED
Start: 2023-09-28

## (undated) RX ORDER — LIDOCAINE HYDROCHLORIDE AND EPINEPHRINE 10; 10 MG/ML; UG/ML
INJECTION, SOLUTION INFILTRATION; PERINEURAL
Status: DISPENSED
Start: 2023-09-28

## (undated) RX ORDER — GLYCOPYRROLATE 0.2 MG/ML
INJECTION, SOLUTION INTRAMUSCULAR; INTRAVENOUS
Status: DISPENSED
Start: 2023-09-28

## (undated) RX ORDER — ONDANSETRON 2 MG/ML
INJECTION INTRAMUSCULAR; INTRAVENOUS
Status: DISPENSED
Start: 2023-09-28

## (undated) RX ORDER — DEXAMETHASONE SODIUM PHOSPHATE 10 MG/ML
INJECTION, SOLUTION INTRAMUSCULAR; INTRAVENOUS
Status: DISPENSED
Start: 2023-09-28

## (undated) RX ORDER — LIDOCAINE HYDROCHLORIDE 10 MG/ML
INJECTION, SOLUTION EPIDURAL; INFILTRATION; INTRACAUDAL; PERINEURAL
Status: DISPENSED
Start: 2023-09-28